# Patient Record
Sex: FEMALE | Race: WHITE | NOT HISPANIC OR LATINO | Employment: STUDENT | URBAN - METROPOLITAN AREA
[De-identification: names, ages, dates, MRNs, and addresses within clinical notes are randomized per-mention and may not be internally consistent; named-entity substitution may affect disease eponyms.]

---
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
## 2023-02-05 ENCOUNTER — HOSPITAL ENCOUNTER (EMERGENCY)
Facility: HOSPITAL | Age: 18
Discharge: HOME | End: 2023-02-05
Attending: EMERGENCY MEDICINE
Payer: COMMERCIAL

## 2023-02-05 VITALS
HEART RATE: 86 BPM | HEIGHT: 62 IN | TEMPERATURE: 98 F | WEIGHT: 105 LBS | BODY MASS INDEX: 19.32 KG/M2 | RESPIRATION RATE: 18 BRPM | SYSTOLIC BLOOD PRESSURE: 114 MMHG | DIASTOLIC BLOOD PRESSURE: 66 MMHG | OXYGEN SATURATION: 95 %

## 2023-02-05 DIAGNOSIS — N93.8 DYSFUNCTIONAL UTERINE BLEEDING: Primary | ICD-10-CM

## 2023-02-05 LAB
ABO + RH BLD: NORMAL
ALBUMIN SERPL BCP-MCNC: 4.3 G/DL (ref 3.5–5.2)
ALP SERPL-CCNC: 67 U/L (ref 55–135)
ALT SERPL W/O P-5'-P-CCNC: 7 U/L (ref 10–44)
ANION GAP SERPL CALC-SCNC: 12 MMOL/L (ref 8–16)
AST SERPL-CCNC: 18 U/L (ref 10–40)
B-HCG UR QL: NEGATIVE
BASOPHILS # BLD AUTO: 0.03 K/UL (ref 0–0.2)
BASOPHILS NFR BLD: 0.5 % (ref 0–1.9)
BILIRUB SERPL-MCNC: 0.4 MG/DL (ref 0.1–1)
BLD GP AB SCN CELLS X3 SERPL QL: NORMAL
BUN SERPL-MCNC: 11 MG/DL (ref 6–20)
CALCIUM SERPL-MCNC: 9.6 MG/DL (ref 8.7–10.5)
CHLORIDE SERPL-SCNC: 109 MMOL/L (ref 95–110)
CO2 SERPL-SCNC: 20 MMOL/L (ref 23–29)
CREAT SERPL-MCNC: 0.8 MG/DL (ref 0.5–1.4)
CTP QC/QA: YES
DIFFERENTIAL METHOD: ABNORMAL
EOSINOPHIL # BLD AUTO: 0.1 K/UL (ref 0–0.5)
EOSINOPHIL NFR BLD: 1.6 % (ref 0–8)
ERYTHROCYTE [DISTWIDTH] IN BLOOD BY AUTOMATED COUNT: 14.8 % (ref 11.5–14.5)
EST. GFR  (NO RACE VARIABLE): >60 ML/MIN/1.73 M^2
GLUCOSE SERPL-MCNC: 117 MG/DL (ref 70–110)
HCG INTACT+B SERPL-ACNC: <1.2 MIU/ML
HCT VFR BLD AUTO: 28.8 % (ref 37–48.5)
HGB BLD-MCNC: 8.4 G/DL (ref 12–16)
IMM GRANULOCYTES # BLD AUTO: 0.02 K/UL (ref 0–0.04)
IMM GRANULOCYTES NFR BLD AUTO: 0.3 % (ref 0–0.5)
LYMPHOCYTES # BLD AUTO: 1.5 K/UL (ref 1–4.8)
LYMPHOCYTES NFR BLD: 23.5 % (ref 18–48)
MCH RBC QN AUTO: 21.5 PG (ref 27–31)
MCHC RBC AUTO-ENTMCNC: 29.2 G/DL (ref 32–36)
MCV RBC AUTO: 74 FL (ref 82–98)
MONOCYTES # BLD AUTO: 0.7 K/UL (ref 0.3–1)
MONOCYTES NFR BLD: 11 % (ref 4–15)
NEUTROPHILS # BLD AUTO: 3.9 K/UL (ref 1.8–7.7)
NEUTROPHILS NFR BLD: 63.1 % (ref 38–73)
NRBC BLD-RTO: 0 /100 WBC
PLATELET # BLD AUTO: 215 K/UL (ref 150–450)
PMV BLD AUTO: ABNORMAL FL (ref 9.2–12.9)
POTASSIUM SERPL-SCNC: 3.5 MMOL/L (ref 3.5–5.1)
PROT SERPL-MCNC: 7.3 G/DL (ref 6–8.4)
RBC # BLD AUTO: 3.9 M/UL (ref 4–5.4)
SODIUM SERPL-SCNC: 141 MMOL/L (ref 136–145)
WBC # BLD AUTO: 6.21 K/UL (ref 3.9–12.7)

## 2023-02-05 PROCEDURE — 85025 COMPLETE CBC W/AUTO DIFF WBC: CPT | Performed by: EMERGENCY MEDICINE

## 2023-02-05 PROCEDURE — 63600175 PHARM REV CODE 636 W HCPCS: Performed by: EMERGENCY MEDICINE

## 2023-02-05 PROCEDURE — 25000003 PHARM REV CODE 250: Performed by: EMERGENCY MEDICINE

## 2023-02-05 PROCEDURE — 81025 URINE PREGNANCY TEST: CPT | Performed by: EMERGENCY MEDICINE

## 2023-02-05 PROCEDURE — 86900 BLOOD TYPING SEROLOGIC ABO: CPT | Performed by: EMERGENCY MEDICINE

## 2023-02-05 PROCEDURE — 99284 EMERGENCY DEPT VISIT MOD MDM: CPT | Mod: 25

## 2023-02-05 PROCEDURE — 96375 TX/PRO/DX INJ NEW DRUG ADDON: CPT

## 2023-02-05 PROCEDURE — 84702 CHORIONIC GONADOTROPIN TEST: CPT | Performed by: EMERGENCY MEDICINE

## 2023-02-05 PROCEDURE — 96374 THER/PROPH/DIAG INJ IV PUSH: CPT

## 2023-02-05 PROCEDURE — 80053 COMPREHEN METABOLIC PANEL: CPT | Performed by: EMERGENCY MEDICINE

## 2023-02-05 PROCEDURE — 96361 HYDRATE IV INFUSION ADD-ON: CPT

## 2023-02-05 RX ORDER — HYDROCODONE BITARTRATE AND ACETAMINOPHEN 5; 325 MG/1; MG/1
1 TABLET ORAL EVERY 6 HOURS PRN
Qty: 12 TABLET | Refills: 0 | Status: SHIPPED | OUTPATIENT
Start: 2023-02-05 | End: 2023-02-15

## 2023-02-05 RX ORDER — HYDROMORPHONE HYDROCHLORIDE 2 MG/ML
0.5 INJECTION, SOLUTION INTRAMUSCULAR; INTRAVENOUS; SUBCUTANEOUS
Status: COMPLETED | OUTPATIENT
Start: 2023-02-05 | End: 2023-02-05

## 2023-02-05 RX ORDER — TRANEXAMIC ACID 650 MG/1
1300 TABLET ORAL 3 TIMES DAILY
Qty: 30 TABLET | Refills: 0 | Status: SHIPPED | OUTPATIENT
Start: 2023-02-05 | End: 2023-02-10

## 2023-02-05 RX ORDER — KETOROLAC TROMETHAMINE 30 MG/ML
10 INJECTION, SOLUTION INTRAMUSCULAR; INTRAVENOUS
Status: COMPLETED | OUTPATIENT
Start: 2023-02-05 | End: 2023-02-05

## 2023-02-05 RX ADMIN — SODIUM CHLORIDE 1000 ML: 9 INJECTION, SOLUTION INTRAVENOUS at 01:02

## 2023-02-05 RX ADMIN — KETOROLAC TROMETHAMINE 10 MG: 30 INJECTION, SOLUTION INTRAMUSCULAR; INTRAVENOUS at 04:02

## 2023-02-05 RX ADMIN — SODIUM CHLORIDE 1000 ML: 9 INJECTION, SOLUTION INTRAVENOUS at 04:02

## 2023-02-05 RX ADMIN — HYDROMORPHONE HYDROCHLORIDE 0.5 MG: 2 INJECTION, SOLUTION INTRAMUSCULAR; INTRAVENOUS; SUBCUTANEOUS at 04:02

## 2023-02-05 NOTE — ED TRIAGE NOTES
"Pt to ED with complaints of heavy vaginal bleeding since this AM. Pt reports this is due to fibroids and possible endmetriosis. Reports been bleeding since Dec 22. States has always had havey periods but never this bad. Reports bleeding through appox 3 pads every 1 hr. States having "clots the size of fist." Pt slightly diaphoretic, pale. Appears to be very weak. Tachycardic. Family at side.     "

## 2023-02-05 NOTE — ED PROVIDER NOTES
Encounter Date: 2/5/2023    SCRIBE #1 NOTE: IJesus, am scribing for, and in the presence of,  Juan Pablo Taylor MD.     History     Chief Complaint   Patient presents with    Vaginal Bleeding     Presents to the ED via POV with c/o vaginal bleeding d/t fibroids and endometriosis. Reprots soaking x 3 pads in 1 hour and passing clots the size of her fist. Patient slightly diaphoretic and pale in triage.      18 y/o female with anemia, uterine fibroids presents to ED with constant vaginal bleeding and abdominal cramps since her menstrual cycle began on 12/22; her symptoms were notably worse and more painful today (9/10), which prompted the ED visit. She has been seeking surgery for uterine fibroids. No exacerbating or alleviating factors. No other associated symptoms. Patient endorses receiving Depo Provera which she got around the same time as her bleeding began endorses compliance with prenatals prescribed by OBGYN and iron supplements. Patient notes history syncopal episodes with unknown etiology. NKDA.     The history is provided by the patient. No  was used.   Review of patient's allergies indicates:  No Known Allergies  Past Medical History:   Diagnosis Date    ADD (attention deficit disorder with hyperactivity)      No past surgical history on file.  Family History   Problem Relation Age of Onset    Heart attacks under age 50 Neg Hx     Early death Neg Hx     Congenital heart disease Neg Hx      Social History     Tobacco Use    Smoking status: Never    Smokeless tobacco: Never   Substance Use Topics    Alcohol use: No    Drug use: No     Review of Systems   Constitutional:  Negative for fever.   HENT:  Negative for sore throat.    Eyes:  Negative for visual disturbance.   Respiratory:  Negative for shortness of breath.    Cardiovascular:  Negative for chest pain.   Gastrointestinal:  Positive for abdominal pain (cramps).   Genitourinary:  Positive for vaginal bleeding. Negative for  dysuria.   Musculoskeletal:  Negative for back pain.   Skin:  Negative for rash.   Neurological:  Negative for headaches.     Physical Exam     Initial Vitals   BP Pulse Resp Temp SpO2   02/05/23 1321 02/05/23 1321 02/05/23 1321 02/05/23 1321 02/05/23 1346   125/75 (!) 126 20 98 °F (36.7 °C) 96 %      MAP       --                Physical Exam    Nursing note and vitals reviewed.  Constitutional: She appears well-developed and well-nourished.   Pale appearance.   HENT:   Head: Normocephalic and atraumatic.   Eyes: EOM are normal. Pupils are equal, round, and reactive to light.   Neck: Neck supple. No thyromegaly present. No JVD present.   Normal range of motion.  Cardiovascular:  Regular rhythm.   Tachycardia present.   Exam reveals no gallop and no friction rub.       No murmur heard.  Pulmonary/Chest: Breath sounds normal. No respiratory distress.   Abdominal: Abdomen is soft. Bowel sounds are normal. There is no abdominal tenderness.   Musculoskeletal:         General: No tenderness or edema. Normal range of motion.      Cervical back: Normal range of motion and neck supple.     Neurological: She is alert and oriented to person, place, and time. She has normal strength. GCS score is 15. GCS eye subscore is 4. GCS verbal subscore is 5. GCS motor subscore is 6.   Skin: Skin is warm and dry. Capillary refill takes less than 2 seconds.   Psychiatric: She has a normal mood and affect.       ED Course   Critical Care    Date/Time: 2/5/2023 11:37 AM  Performed by: Juan Pablo Taylor MD  Authorized by: Juan Pablo Taylor MD   Direct patient critical care time: 20 minutes  Additional history critical care time: 5 minutes  Ordering / reviewing critical care time: 10 minutes  Documentation critical care time: 10 minutes  Consulting other physicians critical care time: 10 minutes  Total critical care time (exclusive of procedural time) : 55 minutes  Critical care time was exclusive of teaching time and separately billable  procedures and treating other patients.  Critical care was necessary to treat or prevent imminent or life-threatening deterioration of the following conditions: shock.  Critical care was time spent personally by me on the following activities: development of treatment plan with patient or surrogate, discussions with consultants, evaluation of patient's response to treatment, examination of patient, obtaining history from patient or surrogate, ordering and performing treatments and interventions, ordering and review of laboratory studies, ordering and review of radiographic studies, pulse oximetry, re-evaluation of patient's condition and review of old charts.      Labs Reviewed   CBC W/ AUTO DIFFERENTIAL - Abnormal; Notable for the following components:       Result Value    RBC 3.90 (*)     Hemoglobin 8.4 (*)     Hematocrit 28.8 (*)     MCV 74 (*)     MCH 21.5 (*)     MCHC 29.2 (*)     RDW 14.8 (*)     All other components within normal limits    Narrative:     Release to patient->Immediate   COMPREHENSIVE METABOLIC PANEL - Abnormal; Notable for the following components:    CO2 20 (*)     Glucose 117 (*)     ALT 7 (*)     All other components within normal limits    Narrative:     Release to patient->Immediate   HCG, QUANTITATIVE    Narrative:     Release to patient->Immediate   POCT URINE PREGNANCY   TYPE & SCREEN          Imaging Results    None          Medications   sodium chloride 0.9% bolus 1,000 mL 1,000 mL (0 mLs Intravenous Stopped 2/5/23 1715)   sodium chloride 0.9% bolus 1,000 mL 1,000 mL (0 mLs Intravenous Stopped 2/5/23 1628)   HYDROmorphone (PF) injection 0.5 mg (0.5 mg Intravenous Given 2/5/23 1628)   ketorolac injection 9.999 mg (9.999 mg Intravenous Given 2/5/23 1627)     Medical Decision Making:   History:   Old Medical Records: I decided to obtain old medical records.  Initial Assessment:   This is an emergent evaluation of a 17 y.o. female who presents with vaginal bleeding. The patient was seen  and examined. The history and physical exam was obtained. The nursing notes and vital signs were reviewed. Secondary to symptoms and examination findings, I ordered CBC, CMP, among others. .incl  Differential Diagnosis:   Includes but is not limited to: uterine fibroids, adverse contraceptive reaction, anemia, electrolyte imbalance, dehydration  Clinical Tests:   Lab Tests: Ordered and Reviewed   Patient arrives significantly tachycardic with heart rates in the 120s.  Patient is anemic but above the threshold for transfusion.  Patient was fluid responsive with tachycardia resolved.  Active bleeding is mild.  Discussed with OBGYN on call who gave recommendations and will follow up in clinic.  Recommended discharge on tranexamic acid     Scribe Attestation:   Scribe #1: I performed the above scribed service and the documentation accurately describes the services I performed. I attest to the accuracy of the note.            I, Juan Pablo Taylor, personally performed the services described in this documentation.  All medical record entries made by the scribe were at my direction and in my presence.  I have reviewed the chart and agree that the record reflects my personal performance and is accurate and complete.       Clinical Impression:   Final diagnoses:  [N93.8] Dysfunctional uterine bleeding (Primary)        ED Disposition Condition    Discharge Stable          ED Prescriptions       Medication Sig Dispense Start Date End Date Auth. Provider    tranexamic acid (LYSTEDA) 650 mg tablet () Take 2 tablets (1,300 mg total) by mouth 3 (three) times daily. for 5 days 30 tablet 2023 2/10/2023 Juan Pablo Taylor MD    HYDROcodone-acetaminophen (NORCO) 5-325 mg per tablet () Take 1 tablet by mouth every 6 (six) hours as needed for Pain. 12 tablet 2023 2/15/2023 Juan Pablo Taylor MD          Follow-up Information       Follow up With Specialties Details Why Contact Info    PortiaBarb Rajput Mai, MD  Obstetrics and Gynecology, Obstetrics, Gynecology Schedule an appointment as soon as possible for a visit  Or OB of your choice [address]  [place] [address]  [phone]               Juan Pablo Taylor MD  02/24/23 0655

## 2023-02-13 ENCOUNTER — TELEPHONE (OUTPATIENT)
Dept: OBSTETRICS AND GYNECOLOGY | Facility: CLINIC | Age: 18
End: 2023-02-13
Payer: COMMERCIAL

## 2023-02-13 NOTE — TELEPHONE ENCOUNTER
----- Message from Rustam Packer sent at 2/13/2023 12:33 PM CST -----      Name of Who is Calling: YESENIA PIKE [[id number]]      What is the request in detail: Pt called to get an appt for cycle lasting too long AND POSSIBLE FIBROIDS.Please contact to further discuss and advise.          Can the clinic reply by [facility]: n      What Number to Call Back if not in [facility]: [phone]

## 2023-03-03 ENCOUNTER — OFFICE VISIT (OUTPATIENT)
Dept: OBSTETRICS AND GYNECOLOGY | Facility: CLINIC | Age: 18
End: 2023-03-03
Payer: COMMERCIAL

## 2023-03-03 VITALS
SYSTOLIC BLOOD PRESSURE: 120 MMHG | WEIGHT: 107.13 LBS | DIASTOLIC BLOOD PRESSURE: 62 MMHG | BODY MASS INDEX: 18.98 KG/M2 | HEIGHT: 63 IN

## 2023-03-03 DIAGNOSIS — D50.0 IRON DEFICIENCY ANEMIA DUE TO CHRONIC BLOOD LOSS: ICD-10-CM

## 2023-03-03 DIAGNOSIS — D21.9 FIBROIDS: Primary | ICD-10-CM

## 2023-03-03 DIAGNOSIS — N93.9 ABNORMAL UTERINE BLEEDING: ICD-10-CM

## 2023-03-03 PROCEDURE — 3074F SYST BP LT 130 MM HG: CPT | Mod: CPTII,S$GLB,, | Performed by: STUDENT IN AN ORGANIZED HEALTH CARE EDUCATION/TRAINING PROGRAM

## 2023-03-03 PROCEDURE — 3008F PR BODY MASS INDEX (BMI) DOCUMENTED: ICD-10-PCS | Mod: CPTII,S$GLB,, | Performed by: STUDENT IN AN ORGANIZED HEALTH CARE EDUCATION/TRAINING PROGRAM

## 2023-03-03 PROCEDURE — 1159F PR MEDICATION LIST DOCUMENTED IN MEDICAL RECORD: ICD-10-PCS | Mod: CPTII,S$GLB,, | Performed by: STUDENT IN AN ORGANIZED HEALTH CARE EDUCATION/TRAINING PROGRAM

## 2023-03-03 PROCEDURE — 3078F PR MOST RECENT DIASTOLIC BLOOD PRESSURE < 80 MM HG: ICD-10-PCS | Mod: CPTII,S$GLB,, | Performed by: STUDENT IN AN ORGANIZED HEALTH CARE EDUCATION/TRAINING PROGRAM

## 2023-03-03 PROCEDURE — 99204 OFFICE O/P NEW MOD 45 MIN: CPT | Mod: S$GLB,,, | Performed by: STUDENT IN AN ORGANIZED HEALTH CARE EDUCATION/TRAINING PROGRAM

## 2023-03-03 PROCEDURE — 99999 PR PBB SHADOW E&M-EST. PATIENT-LVL III: CPT | Mod: PBBFAC,,, | Performed by: STUDENT IN AN ORGANIZED HEALTH CARE EDUCATION/TRAINING PROGRAM

## 2023-03-03 PROCEDURE — 3078F DIAST BP <80 MM HG: CPT | Mod: CPTII,S$GLB,, | Performed by: STUDENT IN AN ORGANIZED HEALTH CARE EDUCATION/TRAINING PROGRAM

## 2023-03-03 PROCEDURE — 3008F BODY MASS INDEX DOCD: CPT | Mod: CPTII,S$GLB,, | Performed by: STUDENT IN AN ORGANIZED HEALTH CARE EDUCATION/TRAINING PROGRAM

## 2023-03-03 PROCEDURE — 3074F PR MOST RECENT SYSTOLIC BLOOD PRESSURE < 130 MM HG: ICD-10-PCS | Mod: CPTII,S$GLB,, | Performed by: STUDENT IN AN ORGANIZED HEALTH CARE EDUCATION/TRAINING PROGRAM

## 2023-03-03 PROCEDURE — 99213 OFFICE O/P EST LOW 20 MIN: CPT | Mod: PBBFAC | Performed by: STUDENT IN AN ORGANIZED HEALTH CARE EDUCATION/TRAINING PROGRAM

## 2023-03-03 PROCEDURE — 99204 PR OFFICE/OUTPT VISIT, NEW, LEVL IV, 45-59 MIN: ICD-10-PCS | Mod: S$GLB,,, | Performed by: STUDENT IN AN ORGANIZED HEALTH CARE EDUCATION/TRAINING PROGRAM

## 2023-03-03 PROCEDURE — 1159F MED LIST DOCD IN RCRD: CPT | Mod: CPTII,S$GLB,, | Performed by: STUDENT IN AN ORGANIZED HEALTH CARE EDUCATION/TRAINING PROGRAM

## 2023-03-03 PROCEDURE — 99999 PR PBB SHADOW E&M-EST. PATIENT-LVL III: ICD-10-PCS | Mod: PBBFAC,,, | Performed by: STUDENT IN AN ORGANIZED HEALTH CARE EDUCATION/TRAINING PROGRAM

## 2023-03-03 RX ORDER — MEDROXYPROGESTERONE ACETATE 150 MG/ML
150 INJECTION, SUSPENSION INTRAMUSCULAR
Status: ON HOLD | COMMUNITY
Start: 2023-02-16 | End: 2023-03-21 | Stop reason: HOSPADM

## 2023-03-03 RX ORDER — HYDROCODONE BITARTRATE AND ACETAMINOPHEN 5; 325 MG/1; MG/1
1 TABLET ORAL
Status: ON HOLD | COMMUNITY
Start: 2023-02-05 | End: 2023-03-21 | Stop reason: HOSPADM

## 2023-03-03 RX ORDER — IBUPROFEN 200 MG
400 TABLET ORAL
Status: ON HOLD | COMMUNITY
End: 2023-03-21 | Stop reason: HOSPADM

## 2023-03-03 NOTE — PROGRESS NOTES
History & Physical  Gynecology      SUBJECTIVE:     Chief Complaint: Consult (Fibroid )       History of Present Illness:  Petra Medina is a 17 y.o. F who presents with her mom to discuss management of HMB due to fibroids. She was seeing a gynecologist at an outside facility and surgery was planned. There were some issues related to cost and coverage, so they are here for a second opinion. She has records with her today on her phone.    She reports that her periods have always been heavy and painful, but fairly regular. She was initially concerned about endometriosis because it runs in her family, but another doctor said she shouldn't worry about that at her age. She was started on the contraceptive patch, which worked fairly well but she didn't like that it was visible to others. She switched tot he nuvaring which she liked a lot. She felt that even though her periods were still heavy and painful, it was much more manageable. Her pain level would be a 4-5 compared to an 8. She was doing well on the nuvaring until then end of last year, when she had prolonged heavy bleeding. A depo shot was recommended but that only seemed to make matters worse. She was told to stay off the nuvaring. In January, the bleeding became so heavy that she went to the ED and was found to have a hemoglobin of 8. She was given oral TXA. An ultrasound was done and showed a 9 cm uterus with a 5 cm fibroid with a submucosal component that displaces the endometrium. Her doctor recommended diagnostic laparoscopy with possible fulguration of lesions, hysteroscopy with D&C, myomectomy, and possible laparotomy.     Currently she states her bleeding is still present but she would classify it as spotting. She does want to move forward with surgery and ultimately get back on the nuvaring. She is not interested in fertility any time soon, but would like to preserve fertility. She has not had a workup for a bleeding disorder. States no family history  of bleeding disorders, no history of bleeding with dental procedures. She does bruise sometimes but states she is just clumsy. She has never required a blood transfusion.       Review of patient's allergies indicates:  No Known Allergies    Past Medical History:   Diagnosis Date    ADD (attention deficit disorder with hyperactivity)      History reviewed. No pertinent surgical history.  OB History          0    Para   0    Term   0       0    AB   0    Living   0         SAB   0    IAB   0    Ectopic   0    Multiple   0    Live Births   0               Family History   Problem Relation Age of Onset    Heart attacks under age 50 Neg Hx     Early death Neg Hx     Congenital heart disease Neg Hx     Breast cancer Neg Hx     Colon cancer Neg Hx     Ovarian cancer Neg Hx      Social History     Tobacco Use    Smoking status: Never    Smokeless tobacco: Never   Substance Use Topics    Alcohol use: No    Drug use: No       Current Outpatient Medications   Medication Sig    ferrous sulfate 134 mg (27 mg iron) Tab Take 1 tablet by mouth.    HYDROcodone-acetaminophen (NORCO) 5-325 mg per tablet Take 1 tablet by mouth.    ibuprofen (ADVIL,MOTRIN) 200 MG tablet Take 400 mg by mouth.    medroxyPROGESTERone (DEPO-PROVERA) 150 mg/mL Syrg Inject 150 mg into the muscle every 3 (three) months.    prenatal vit-iron fum-folic ac 27 mg iron- 0.8 mg Tab Take 1 tablet by mouth.    DOCOSAHEXANOIC ACID/EPA (FISH OIL ORAL) Take by mouth.    famotidine (PEPCID) 20 MG tablet Take 2 tablets (40 mg total) by mouth 2 (two) times daily. (Patient not taking: Reported on 3/3/2023)    methylphenidate (CONCERTA) 54 MG CR tablet Take 36 mg by mouth every morning.     methylphenidate HCl (RITALIN) 5 MG tablet 5 mg.    sertraline (ZOLOFT) 50 MG tablet Take 100 mg by mouth once daily.      No current facility-administered medications for this visit.         Review of Systems:  Review of Systems   Constitutional:  Negative for fever.    Respiratory:  Negative for shortness of breath.    Cardiovascular:  Negative for chest pain.   Gastrointestinal:  Negative for abdominal pain, nausea and vomiting.   Genitourinary:  Positive for dysmenorrhea, menorrhagia, menstrual problem and vaginal bleeding. Negative for pelvic pain.   Neurological:  Negative for headaches.      OBJECTIVE:     Physical Exam:  Physical Exam  Vitals reviewed.   Constitutional:       General: She is not in acute distress.     Appearance: She is well-developed. She is not ill-appearing, toxic-appearing or diaphoretic.   HENT:      Head: Normocephalic and atraumatic.   Eyes:      Conjunctiva/sclera: Conjunctivae normal.   Cardiovascular:      Rate and Rhythm: Normal rate.   Pulmonary:      Effort: Pulmonary effort is normal. No respiratory distress.   Abdominal:      Palpations: Abdomen is soft.      Tenderness: There is no abdominal tenderness.   Musculoskeletal:         General: No tenderness. Normal range of motion.      Cervical back: Normal range of motion.   Skin:     General: Skin is warm and dry.   Neurological:      Mental Status: She is alert and oriented to person, place, and time.   Psychiatric:         Behavior: Behavior normal.         Thought Content: Thought content normal.         Judgment: Judgment normal.         ASSESSMENT:       ICD-10-CM ICD-9-CM    1. Fibroids  D21.9 215.9       2. Abnormal uterine bleeding  N93.9 626.9       3. Iron deficiency anemia due to chronic blood loss  D50.0 280.0              Plan:      -- Lengthy discussion about management of fibroids -- medical and surgical options including UAE. Discussed different types of myomectomy depending on location of fibroids, which can be difficult to assess on ultrasound.  -- She is interested in surgery. Would recommend diagnostic laparoscopy and hysteroscopic myomectomy at minimum. She would prefer to avoid laparotomy. Discussed implications of myomectomy when fibroid is intramural in terms of  obstetric care. All questions answered.  -- She will consider her options and let us know if she would like to proceed with surgery. Would recommend repeating CBC prior surgery, including coagulopathy workup.       Katja Dang MD

## 2023-03-06 ENCOUNTER — TELEPHONE (OUTPATIENT)
Dept: OBSTETRICS AND GYNECOLOGY | Facility: CLINIC | Age: 18
End: 2023-03-06
Payer: COMMERCIAL

## 2023-03-06 DIAGNOSIS — N93.9 ABNORMAL UTERINE BLEEDING: ICD-10-CM

## 2023-03-06 DIAGNOSIS — D21.9 FIBROIDS: Primary | ICD-10-CM

## 2023-03-06 DIAGNOSIS — D50.0 IRON DEFICIENCY ANEMIA DUE TO CHRONIC BLOOD LOSS: ICD-10-CM

## 2023-03-06 NOTE — TELEPHONE ENCOUNTER
----- Message from Florentino Tirado sent at 3/6/2023  2:15 PM CST -----  PLEASE CALL PT SHE NEEDS TO RESCHEDULE HER PRE-OP APPT 3/17 [phone]

## 2023-03-17 ENCOUNTER — PATIENT MESSAGE (OUTPATIENT)
Dept: SURGERY | Facility: OTHER | Age: 18
End: 2023-03-17
Payer: COMMERCIAL

## 2023-03-17 ENCOUNTER — OFFICE VISIT (OUTPATIENT)
Dept: OBSTETRICS AND GYNECOLOGY | Facility: CLINIC | Age: 18
End: 2023-03-17
Payer: MEDICAID

## 2023-03-17 ENCOUNTER — ANESTHESIA EVENT (OUTPATIENT)
Dept: SURGERY | Facility: OTHER | Age: 18
End: 2023-03-17
Payer: COMMERCIAL

## 2023-03-17 ENCOUNTER — TELEPHONE (OUTPATIENT)
Dept: OBSTETRICS AND GYNECOLOGY | Facility: CLINIC | Age: 18
End: 2023-03-17
Payer: COMMERCIAL

## 2023-03-17 ENCOUNTER — HOSPITAL ENCOUNTER (OUTPATIENT)
Dept: PREADMISSION TESTING | Facility: OTHER | Age: 18
Discharge: HOME | End: 2023-03-17
Attending: STUDENT IN AN ORGANIZED HEALTH CARE EDUCATION/TRAINING PROGRAM
Payer: COMMERCIAL

## 2023-03-17 VITALS
RESPIRATION RATE: 16 BRPM | SYSTOLIC BLOOD PRESSURE: 109 MMHG | DIASTOLIC BLOOD PRESSURE: 62 MMHG | HEIGHT: 63 IN | TEMPERATURE: 98 F | HEART RATE: 90 BPM | OXYGEN SATURATION: 100 % | WEIGHT: 108 LBS | BODY MASS INDEX: 19.14 KG/M2

## 2023-03-17 VITALS
SYSTOLIC BLOOD PRESSURE: 106 MMHG | DIASTOLIC BLOOD PRESSURE: 60 MMHG | HEIGHT: 63 IN | BODY MASS INDEX: 19.18 KG/M2 | WEIGHT: 108.25 LBS

## 2023-03-17 DIAGNOSIS — D50.0 IRON DEFICIENCY ANEMIA DUE TO CHRONIC BLOOD LOSS: ICD-10-CM

## 2023-03-17 DIAGNOSIS — D21.9 FIBROIDS: Primary | ICD-10-CM

## 2023-03-17 DIAGNOSIS — Z01.818 PREOP TESTING: Primary | ICD-10-CM

## 2023-03-17 DIAGNOSIS — N93.9 ABNORMAL UTERINE BLEEDING: ICD-10-CM

## 2023-03-17 LAB
ABO + RH BLD: NORMAL
APTT BLDCRRT: 24 SEC (ref 21–32)
BASOPHILS # BLD AUTO: 0.04 K/UL (ref 0–0.2)
BASOPHILS NFR BLD: 0.8 % (ref 0–1.9)
BLD GP AB SCN CELLS X3 SERPL QL: NORMAL
DIFFERENTIAL METHOD: ABNORMAL
EOSINOPHIL # BLD AUTO: 0.1 K/UL (ref 0–0.5)
EOSINOPHIL NFR BLD: 1.5 % (ref 0–8)
ERYTHROCYTE [DISTWIDTH] IN BLOOD BY AUTOMATED COUNT: 16.9 % (ref 11.5–14.5)
FERRITIN SERPL-MCNC: <2 NG/ML (ref 20–300)
FIBRINOGEN PPP-MCNC: 249 MG/DL (ref 182–400)
HCT VFR BLD AUTO: 26.3 % (ref 37–48.5)
HGB BLD-MCNC: 7 G/DL (ref 12–16)
IMM GRANULOCYTES # BLD AUTO: 0.01 K/UL (ref 0–0.04)
IMM GRANULOCYTES NFR BLD AUTO: 0.2 % (ref 0–0.5)
INR PPP: 1.1 (ref 0.8–1.2)
LYMPHOCYTES # BLD AUTO: 1.5 K/UL (ref 1–4.8)
LYMPHOCYTES NFR BLD: 31.2 % (ref 18–48)
MCH RBC QN AUTO: 18.2 PG (ref 27–31)
MCHC RBC AUTO-ENTMCNC: 26.6 G/DL (ref 32–36)
MCV RBC AUTO: 69 FL (ref 82–98)
MONOCYTES # BLD AUTO: 0.5 K/UL (ref 0.3–1)
MONOCYTES NFR BLD: 10.9 % (ref 4–15)
NEUTROPHILS # BLD AUTO: 2.7 K/UL (ref 1.8–7.7)
NEUTROPHILS NFR BLD: 55.4 % (ref 38–73)
NRBC BLD-RTO: 0 /100 WBC
PLATELET # BLD AUTO: 306 K/UL (ref 150–450)
PMV BLD AUTO: ABNORMAL FL (ref 9.2–12.9)
PROTHROMBIN TIME: 12.1 SEC (ref 9–12.5)
RBC # BLD AUTO: 3.84 M/UL (ref 4–5.4)
WBC # BLD AUTO: 4.78 K/UL (ref 3.9–12.7)

## 2023-03-17 PROCEDURE — 86920 COMPATIBILITY TEST SPIN: CPT | Performed by: NURSE PRACTITIONER

## 2023-03-17 PROCEDURE — 85397 CLOTTING FUNCT ACTIVITY: CPT | Performed by: STUDENT IN AN ORGANIZED HEALTH CARE EDUCATION/TRAINING PROGRAM

## 2023-03-17 PROCEDURE — 85730 THROMBOPLASTIN TIME PARTIAL: CPT | Performed by: STUDENT IN AN ORGANIZED HEALTH CARE EDUCATION/TRAINING PROGRAM

## 2023-03-17 PROCEDURE — 99999 PR PBB SHADOW E&M-EST. PATIENT-LVL III: CPT | Mod: PBBFAC,,, | Performed by: STUDENT IN AN ORGANIZED HEALTH CARE EDUCATION/TRAINING PROGRAM

## 2023-03-17 PROCEDURE — 99024 POSTOP FOLLOW-UP VISIT: CPT | Mod: ,,, | Performed by: STUDENT IN AN ORGANIZED HEALTH CARE EDUCATION/TRAINING PROGRAM

## 2023-03-17 PROCEDURE — 3078F PR MOST RECENT DIASTOLIC BLOOD PRESSURE < 80 MM HG: ICD-10-PCS | Mod: CPTII,,, | Performed by: STUDENT IN AN ORGANIZED HEALTH CARE EDUCATION/TRAINING PROGRAM

## 2023-03-17 PROCEDURE — 85240 CLOT FACTOR VIII AHG 1 STAGE: CPT | Performed by: STUDENT IN AN ORGANIZED HEALTH CARE EDUCATION/TRAINING PROGRAM

## 2023-03-17 PROCEDURE — 3008F PR BODY MASS INDEX (BMI) DOCUMENTED: ICD-10-PCS | Mod: CPTII,,, | Performed by: STUDENT IN AN ORGANIZED HEALTH CARE EDUCATION/TRAINING PROGRAM

## 2023-03-17 PROCEDURE — 85384 FIBRINOGEN ACTIVITY: CPT | Performed by: STUDENT IN AN ORGANIZED HEALTH CARE EDUCATION/TRAINING PROGRAM

## 2023-03-17 PROCEDURE — 3008F BODY MASS INDEX DOCD: CPT | Mod: CPTII,,, | Performed by: STUDENT IN AN ORGANIZED HEALTH CARE EDUCATION/TRAINING PROGRAM

## 2023-03-17 PROCEDURE — 36415 COLL VENOUS BLD VENIPUNCTURE: CPT | Performed by: STUDENT IN AN ORGANIZED HEALTH CARE EDUCATION/TRAINING PROGRAM

## 2023-03-17 PROCEDURE — 99213 OFFICE O/P EST LOW 20 MIN: CPT | Mod: PBBFAC | Performed by: STUDENT IN AN ORGANIZED HEALTH CARE EDUCATION/TRAINING PROGRAM

## 2023-03-17 PROCEDURE — 3074F SYST BP LT 130 MM HG: CPT | Mod: CPTII,,, | Performed by: STUDENT IN AN ORGANIZED HEALTH CARE EDUCATION/TRAINING PROGRAM

## 2023-03-17 PROCEDURE — 86900 BLOOD TYPING SEROLOGIC ABO: CPT | Performed by: ANESTHESIOLOGY

## 2023-03-17 PROCEDURE — 3074F PR MOST RECENT SYSTOLIC BLOOD PRESSURE < 130 MM HG: ICD-10-PCS | Mod: CPTII,,, | Performed by: STUDENT IN AN ORGANIZED HEALTH CARE EDUCATION/TRAINING PROGRAM

## 2023-03-17 PROCEDURE — 85610 PROTHROMBIN TIME: CPT | Performed by: STUDENT IN AN ORGANIZED HEALTH CARE EDUCATION/TRAINING PROGRAM

## 2023-03-17 PROCEDURE — 85246 CLOT FACTOR VIII VW ANTIGEN: CPT | Performed by: STUDENT IN AN ORGANIZED HEALTH CARE EDUCATION/TRAINING PROGRAM

## 2023-03-17 PROCEDURE — 85025 COMPLETE CBC W/AUTO DIFF WBC: CPT | Performed by: STUDENT IN AN ORGANIZED HEALTH CARE EDUCATION/TRAINING PROGRAM

## 2023-03-17 PROCEDURE — 3078F DIAST BP <80 MM HG: CPT | Mod: CPTII,,, | Performed by: STUDENT IN AN ORGANIZED HEALTH CARE EDUCATION/TRAINING PROGRAM

## 2023-03-17 PROCEDURE — 99999 PR PBB SHADOW E&M-EST. PATIENT-LVL III: ICD-10-PCS | Mod: PBBFAC,,, | Performed by: STUDENT IN AN ORGANIZED HEALTH CARE EDUCATION/TRAINING PROGRAM

## 2023-03-17 PROCEDURE — 82728 ASSAY OF FERRITIN: CPT | Performed by: STUDENT IN AN ORGANIZED HEALTH CARE EDUCATION/TRAINING PROGRAM

## 2023-03-17 PROCEDURE — 99024 PR POST-OP FOLLOW-UP VISIT: ICD-10-PCS | Mod: ,,, | Performed by: STUDENT IN AN ORGANIZED HEALTH CARE EDUCATION/TRAINING PROGRAM

## 2023-03-17 RX ORDER — SODIUM CHLORIDE, SODIUM LACTATE, POTASSIUM CHLORIDE, CALCIUM CHLORIDE 600; 310; 30; 20 MG/100ML; MG/100ML; MG/100ML; MG/100ML
INJECTION, SOLUTION INTRAVENOUS CONTINUOUS
Status: CANCELLED | OUTPATIENT
Start: 2023-03-17

## 2023-03-17 RX ORDER — ACETAMINOPHEN 500 MG
1000 TABLET ORAL
Status: CANCELLED | OUTPATIENT
Start: 2023-03-17 | End: 2023-03-17

## 2023-03-17 RX ORDER — LIDOCAINE HYDROCHLORIDE 10 MG/ML
0.5 INJECTION, SOLUTION EPIDURAL; INFILTRATION; INTRACAUDAL; PERINEURAL ONCE
Status: CANCELLED | OUTPATIENT
Start: 2023-03-17 | End: 2023-03-17

## 2023-03-17 NOTE — PRE ADMISSION SCREENING
Dr Dang called re CBC results. Dr Love also informed. Per Dr Love- an IROn Transfusion is recommended and waiting until pt is optimized for surgery.

## 2023-03-17 NOTE — DISCHARGE INSTRUCTIONS
Information to Prepare you for your Surgery    PRE-ADMIT TESTING -  [phone]    [address] \[facility]\""[facility]          Your surgery has been scheduled at [facility]. We are pleased to have the opportunity to serve you. For Further Information please call [phone].    On the day of surgery please report to the Information Desk on the 1st floor.    CONTACT YOUR PHYSICIAN'S OFFICE THE DAY PRIOR TO YOUR SURGERY TO OBTAIN YOUR ARRIVAL TIME.     The evening before surgery do not eat anything after 9 p.m. ( this includes hard candy, chewing gum and mints).  You may only have GATORADE, POWERADE AND WATER  from 9 p.m. until you leave your home.   DO NOT DRINK ANY LIQUIDS ON THE WAY TO THE HOSPITAL.      Why does your anesthesiologist allow you to drink Gatorade/Powerade before surgery?  Gatorade/Powerade helps to increase your comfort before surgery and to decrease your nausea after surgery. The carbohydrates in Gatorade/Powerade help reduce your body's stress response to surgery.  If you are a diabetic-drink only water prior to surgery.      Current Visitor policy(12/27/2021) - Patients may have 2 visitors pre and post procedure. Only 2 visitors will be allowed in the Surgical building with the patient. No one under the age of 12 will be allowed into the facility.    SPECIAL MEDICATION INSTRUCTIONS: TAKE medications checked off by the Anesthesiologist on your Medication List.    Angiogram Patients: Take medications as instructed by your physician, including aspirin.     Surgery Patients:    If you take ASPIRIN - Your PHYSICIAN/SURGEON will need to inform you IF/OR when you need to stop taking aspirin prior to your surgery.     The week prior to surgery do not ot take any medications containing IBUPROFEN or NSAIDS ( Advil, Motrin, Goodys, BC, Aleve, Naproxen etc) If you are not sure if you should take a medicine please call your surgeon's office.  Ok to take  Tylenol    Do Not Wear any make-up (especially eye make-up) to surgery. Please remove any false eyelashes or eyelash extensions. If you arrive the day of surgery with makeup/eyelashes on you will be required to remove prior to surgery. (There is a risk of corneal abrasions if eye makeup/eyelash extensions are not removed)      Leave all valuables at home.   Do Not wear any jewelry or watches, including any metal in body piercings. Jewelry must be removed prior to coming to the hospital.  There is a possibility that rings that are unable to be removed may be cut off if they are on the surgical extremity.    Please remove all hair extensions, wigs, clips and any other metal accessories/ ornaments from your hair.  These items may pose a flammable/fire risk in Surgery and must be removed.    Do not shave your surgical area at least 5 days prior to your surgery. The surgical prep will be performed at the hospital according to Infection Control regulations.    Contact Lens must be removed before surgery. Either do not wear the contact lens or bring a case and solution for storage.  Please bring a container for eyeglasses or dentures as required.  Bring any paperwork your physician has provided, such as consent forms,  history and physicals, doctor's orders, etc.   Bring comfortable clothes that are loose fitting to wear upon discharge. Take into consideration the type of surgery being performed.  Maintain your diet as advised per your physician the day prior to surgery.      Adequate rest the night before surgery is advised.   Park in the Parking lot behind the hospital or in the [facility] across the street from the parking lot. Parking is complimentary.  If you will be discharged the same day as your procedure, please arrange for a responsible adult to drive you home or to accompany you if traveling by taxi.   YOU WILL NOT BE PERMITTED TO DRIVE OR TO LEAVE THE HOSPITAL ALONE AFTER SURGERY.   If you are  being discharged the same day, it is strongly recommended that you arrange for someone to remain with you for the first 24 hrs following your surgery.    The Surgeon will speak to your family/visitor after your surgery regarding the outcome of your surgery and post op care.  The Surgeon may speak to you after your surgery, but there is a possibility you may not remember the details.  Please check with your family members regarding the conversation with the Surgeon.    We strongly recommend whoever is bringing you home be present for discharge instructions.  This will ensure a thorough understanding for your post op home care.    ALL CHILDREN MUST ALWAYS BE ACCOMPANIED BY AN ADULT.    Visitors-Refer to current Visitor policy handouts.    Thank you for your cooperation.  The Staff of [facility].            Bathing Instructions with Hibiclens    Shower the evening before and morning of your procedure with Chlorhexidine (Hibiclens)  do not use Chlorhexidine on your face or genitals. Do not get in your eyes.  Wash your face with water and your regular face wash/soap  Use your regular shampoo  Apply Chlorhexidine (Hibiclens) directly on your skin or on a wet washcloth and wash gently. When showering: Move away from the shower stream when applying Chlorhexidine (Hibiclens) to avoid rinsing off too soon.  Rinse thoroughly with warm water  Do not dilute Chlorhexidine (Hibiclens)   Dry off as usual, do not use any deodorant, powder, body lotions, perfume, after shave or cologne.

## 2023-03-17 NOTE — ANESTHESIA PREPROCEDURE EVALUATION
03/17/2023  Petra Medina is a 17 y.o., female.      Pre-op Assessment    I have reviewed the Patient Summary Reports.     I have reviewed the Nursing Notes. I have reviewed the NPO Status.   I have reviewed the Medications.     Review of Systems  Anesthesia Hx:  Neg history of prior surgery. Denies Family Hx of Anesthesia complications.   Denies Personal Hx of Anesthesia complications.   Social:  Non-Smoker    Hematology/Oncology:     Oncology Normal    -- Anemia (last hb 8.4): Hematology Comments: Heavy vag bleeding  Known uterine fibroid    W/u in progress to r/o [employer], factor 8  No hx    [place] with anemia one flight stairs    EENT/Dental:EENT/Dental Normal   Cardiovascular:  Cardiovascular Normal Exercise tolerance: good     Pulmonary:  Pulmonary Normal    Renal/:  Renal/ Normal     Hepatic/GI:  Hepatic/GI Normal    Musculoskeletal:  Musculoskeletal Normal    Neurological:  Neurology Normal    Endocrine:  Endocrine Normal    Dermatological:  Skin Normal    Psych:  Psychiatric Normal           Physical Exam  General: Cooperative, Alert and Oriented    Airway:  Mallampati: II   Mouth Opening: Normal  TM Distance: Normal  Tongue: Piercing  Neck ROM: Normal ROM    Dental:  Intact        Anesthesia Plan  Type of Anesthesia, risks & benefits discussed:    Anesthesia Type: Gen ETT  Intra-op Monitoring Plan: Standard ASA Monitors  Post Op Pain Control Plan: multimodal analgesia and IV/PO Opioids PRN  Induction:  IV  Airway Plan: Video  Informed Consent: Informed consent signed with the Patient and all parties understand the risks and agree with anesthesia plan.  All questions answered.   ASA Score: 2  Anesthesia Plan Notes: CBC today with heme w/u lab    Pt received unit PRBC yesterday as well as Iron infusion  Another unit supposedly prepared    Ready For Surgery From Anesthesia  Perspective.     .

## 2023-03-17 NOTE — TELEPHONE ENCOUNTER
Spoke with pt in regard to blood transfusion. Notified pt of blood type shortage and advise her on next best (iv iron) and postpone surgery date. Pt verbalized understand and all questions answered.

## 2023-03-20 ENCOUNTER — TELEPHONE (OUTPATIENT)
Dept: OBSTETRICS AND GYNECOLOGY | Facility: CLINIC | Age: 18
End: 2023-03-20
Payer: COMMERCIAL

## 2023-03-20 ENCOUNTER — HOSPITAL ENCOUNTER (EMERGENCY)
Facility: OTHER | Age: 18
Discharge: HOME | End: 2023-03-20
Attending: EMERGENCY MEDICINE
Payer: COMMERCIAL

## 2023-03-20 VITALS
OXYGEN SATURATION: 100 % | DIASTOLIC BLOOD PRESSURE: 85 MMHG | HEIGHT: 63 IN | WEIGHT: 108 LBS | TEMPERATURE: 99 F | SYSTOLIC BLOOD PRESSURE: 130 MMHG | RESPIRATION RATE: 18 BRPM | HEART RATE: 87 BPM | BODY MASS INDEX: 19.14 KG/M2

## 2023-03-20 DIAGNOSIS — D64.9 SYMPTOMATIC ANEMIA: Primary | ICD-10-CM

## 2023-03-20 DIAGNOSIS — E61.1 IRON DEFICIENCY: ICD-10-CM

## 2023-03-20 DIAGNOSIS — D21.9 FIBROIDS: ICD-10-CM

## 2023-03-20 LAB
ALBUMIN SERPL BCP-MCNC: 4.5 G/DL (ref 3.5–5.2)
ALP SERPL-CCNC: 77 U/L (ref 55–135)
ALT SERPL W/O P-5'-P-CCNC: 8 U/L (ref 10–44)
ANION GAP SERPL CALC-SCNC: 9 MMOL/L (ref 8–16)
AST SERPL-CCNC: 17 U/L (ref 10–40)
BASOPHILS # BLD AUTO: 0.05 K/UL (ref 0–0.2)
BASOPHILS NFR BLD: 0.8 % (ref 0–1.9)
BILIRUB SERPL-MCNC: 0.3 MG/DL (ref 0.1–1)
BLD PROD TYP BPU: NORMAL
BLOOD UNIT EXPIRATION DATE: NORMAL
BLOOD UNIT TYPE CODE: 5100
BLOOD UNIT TYPE: NORMAL
BUN SERPL-MCNC: 7 MG/DL (ref 6–20)
CALCIUM SERPL-MCNC: 9 MG/DL (ref 8.7–10.5)
CHLORIDE SERPL-SCNC: 108 MMOL/L (ref 95–110)
CO2 SERPL-SCNC: 21 MMOL/L (ref 23–29)
CODING SYSTEM: NORMAL
CREAT SERPL-MCNC: 0.8 MG/DL (ref 0.5–1.4)
CROSSMATCH INTERPRETATION: NORMAL
DIFFERENTIAL METHOD: ABNORMAL
DISPENSE STATUS: NORMAL
EOSINOPHIL # BLD AUTO: 0.1 K/UL (ref 0–0.5)
EOSINOPHIL NFR BLD: 1.7 % (ref 0–8)
ERYTHROCYTE [DISTWIDTH] IN BLOOD BY AUTOMATED COUNT: 16.8 % (ref 11.5–14.5)
EST. GFR  (NO RACE VARIABLE): >60 ML/MIN/1.73 M^2
FACT VIII ACT/NOR PPP: 153 % (ref 60–170)
GLUCOSE SERPL-MCNC: 106 MG/DL (ref 70–110)
HCT VFR BLD AUTO: 26.7 % (ref 37–48.5)
HGB BLD-MCNC: 7.3 G/DL (ref 12–16)
IMM GRANULOCYTES # BLD AUTO: 0.02 K/UL (ref 0–0.04)
IMM GRANULOCYTES NFR BLD AUTO: 0.3 % (ref 0–0.5)
IRON SERPL-MCNC: 10 UG/DL (ref 30–160)
LYMPHOCYTES # BLD AUTO: 1.9 K/UL (ref 1–4.8)
LYMPHOCYTES NFR BLD: 31.1 % (ref 18–48)
MCH RBC QN AUTO: 18.4 PG (ref 27–31)
MCHC RBC AUTO-ENTMCNC: 27.3 G/DL (ref 32–36)
MCV RBC AUTO: 67 FL (ref 82–98)
MONOCYTES # BLD AUTO: 0.6 K/UL (ref 0.3–1)
MONOCYTES NFR BLD: 10.3 % (ref 4–15)
NEUTROPHILS # BLD AUTO: 3.4 K/UL (ref 1.8–7.7)
NEUTROPHILS NFR BLD: 55.8 % (ref 38–73)
NRBC BLD-RTO: 0 /100 WBC
PLATELET # BLD AUTO: 344 K/UL (ref 150–450)
PLATELET BLD QL SMEAR: ABNORMAL
PMV BLD AUTO: ABNORMAL FL (ref 9.2–12.9)
POTASSIUM SERPL-SCNC: 3.3 MMOL/L (ref 3.5–5.1)
PROT SERPL-MCNC: 7.7 G/DL (ref 6–8.4)
RBC # BLD AUTO: 3.96 M/UL (ref 4–5.4)
SATURATED IRON: 2 % (ref 20–50)
SODIUM SERPL-SCNC: 138 MMOL/L (ref 136–145)
TOTAL IRON BINDING CAPACITY: 555 UG/DL (ref 250–450)
TRANS ERYTHROCYTES VOL PATIENT: NORMAL ML
TRANSFERRIN SERPL-MCNC: 375 MG/DL (ref 200–375)
VWF AG ACT/NOR PPP IA: 116 % (ref 55–200)
VWF:AC ACT/NOR PPP IA: 93 % (ref 55–200)
WBC # BLD AUTO: 6.01 K/UL (ref 3.9–12.7)

## 2023-03-20 PROCEDURE — 84466 ASSAY OF TRANSFERRIN: CPT | Performed by: NURSE PRACTITIONER

## 2023-03-20 PROCEDURE — 36430 TRANSFUSION BLD/BLD COMPNT: CPT

## 2023-03-20 PROCEDURE — 25000003 PHARM REV CODE 250: Performed by: NURSE PRACTITIONER

## 2023-03-20 PROCEDURE — P9021 RED BLOOD CELLS UNIT: HCPCS | Performed by: NURSE PRACTITIONER

## 2023-03-20 PROCEDURE — 96365 THER/PROPH/DIAG IV INF INIT: CPT | Mod: 59

## 2023-03-20 PROCEDURE — 99285 EMERGENCY DEPT VISIT HI MDM: CPT | Mod: 25

## 2023-03-20 PROCEDURE — 85025 COMPLETE CBC W/AUTO DIFF WBC: CPT | Performed by: NURSE PRACTITIONER

## 2023-03-20 PROCEDURE — 63600175 PHARM REV CODE 636 W HCPCS: Performed by: NURSE PRACTITIONER

## 2023-03-20 PROCEDURE — 36415 COLL VENOUS BLD VENIPUNCTURE: CPT | Performed by: NURSE PRACTITIONER

## 2023-03-20 PROCEDURE — 80053 COMPREHEN METABOLIC PANEL: CPT | Performed by: NURSE PRACTITIONER

## 2023-03-20 RX ORDER — HYDROCODONE BITARTRATE AND ACETAMINOPHEN 500; 5 MG/1; MG/1
TABLET ORAL
Status: DISCONTINUED | OUTPATIENT
Start: 2023-03-20 | End: 2023-03-20 | Stop reason: HOSPADM

## 2023-03-20 RX ADMIN — IRON SUCROSE 300 MG: 20 INJECTION, SOLUTION INTRAVENOUS at 04:03

## 2023-03-20 NOTE — ED TRIAGE NOTES
Sent here for blood transfusion prior to scheduled surgery tomorrow. Mother reports h/h of 7.0/26.3. Pt reports SOB, chills, fatigue and joint/back pain since January. Pale, reports vaginal bleeding since December.

## 2023-03-20 NOTE — TELEPHONE ENCOUNTER
----- Message from Pamela Savage sent at 3/20/2023  1:48 PM CDT -----  Regarding: Return call    Type:  Patient Returning Call    Who Called: YESENIA PIKE [[phone]]    Who Left Message for Patient: unknown     Does the patient know what this is regarding?: N    Can the clinic reply in [facility]: No    Best Call Back Number: [phone]    Additional Information: N/A

## 2023-03-20 NOTE — ED NOTES
Pt awake and alert; resting quietly on stretcher. Mother is at bedside.  Pt denies pain at this time; no acute distress or discomfort reported or observed.  Pt denies restroom needs at this time; is able to reposition self on stretcher. Bed locked in lowest position; side rails up and locked x 2; call light, bedside table, and personal belongings within reach. Room assessed for safety measures and cleanliness; no action needed at this time. Plan of care discussed.  Pt instructed to alert nurse for assistance and before attempting to get out of bed; verbalizes understanding. Pt denies needs or complaints at this time; will continue to monitor.

## 2023-03-20 NOTE — ED PROVIDER NOTES
"Source of History:  Patient    Chief complaint:  Transfusion and Shortness of Breath (Sent here for blood transfusion prior to scheduled surgery tomorrow. Mother reports h/h of 7.0/26.3. pt reports sob, chills, fatigue and joint/back pain since January. Pale, reports vaginal bleeding since December. )      HPI:  Petra Medina is a 17 y.o. female presenting with need for blood transfusion and iron infusion.  She is scheduled for a myomectomy tomorrow with gyn however she was noted to be anemic with a hemoglobin of 7.  She also reports having fatigue and shortness of breath that has been increasing over the past few weeks.  The anesthesiologist was also recommending a iron infusion prior to surgery.  Was sent to the ED for blood transfusion.    This is the extent to the patients complaints today here in the emergency department.    PMH:  As per HPI and below:  Past Medical History:   Diagnosis Date    ADD (attention deficit disorder with hyperactivity)     Anemia, unspecified      No past surgical history on file.    Social History     Tobacco Use    Smoking status: Never    Smokeless tobacco: Never    Tobacco comments:     VAPING   Substance Use Topics    Alcohol use: No     Comment: SOCIAL    Drug use: No       Review of patient's allergies indicates:   Allergen Reactions    Cherries      unknown    Lavender      unknown       ROS: As per HPI and below:  Constitutional: No fever.  No chills.  Fatigue  Eyes: No visual changes.  ENT: No sore throat. No ear pain.  Cardiovascular:  Palpitations  Respiratory:  Shortness of breath  GI: No  abdominal pain.  No nausea or vomiting.  Genitourinary: No abnormal urination.  Neurologic: No headache. No focal weakness.  No numbness.  MSK: no back pain.  Integument: No rashes or lesions.  Hematologic: No easy bruising.  Endocrine: No excessive thirst or urination.    Physical Exam:    /85   Pulse 87   Temp 98.9 °F (37.2 °C) (Oral)   Resp 18   Ht 5' 3" (1.6 " m)   Wt 49 kg (108 lb)   LMP 02/24/2023 (Exact Date)   SpO2 100%   BMI 19.13 kg/m²   Vitals:    03/20/23 1550 03/20/23 1605 03/20/23 1613 03/20/23 1658   BP: (!) 105/58 (!) 97/56 (!) 99/55 123/64   Pulse: 81 87 87 87   Resp: 16 16 16 16   Temp: 99.1 °F (37.3 °C) 99.2 °F (37.3 °C) 99.3 °F (37.4 °C) 98.8 °F (37.1 °C)   TempSrc: Oral Oral Oral Oral   SpO2: 100% 100% 100% 100%   Weight:       Height:        03/20/23 1824   BP: 130/85   Pulse: 87   Resp: 18   Temp: 98.9 °F (37.2 °C)   TempSrc: Oral   SpO2: 100%   Weight:    Height:        Nursing note and vital signs reviewed.  Constitutional: No acute distress.  Well-appearing, non-toxic.  Pale-appearing  Eyes: No conjunctival injection.  Extraocular muscles are intact.  ENT: Oropharynx clear.  Mucous membranes are pink and moist.  Cardiovascular:  Regular rate and rhythm no murmurs gallops or rubs.  Chest/ Respiratory:  Clear to auscultation bilaterally without wheezing rhonchi or rales.  No chest wall tenderness, crepitus, bruising.    Abdomen: Soft.  Not distended.  Nontender.  No guarding.  No rebound. Non-peritoneal.  Musculoskeletal: Good range of motion all joints.  No deformities.  Neck supple.  No meningismus.  Skin: No rashes seen.  Good turgor.  No abrasions.  No ecchymoses.  Neuro: alert and oriented x3,  no focal neurological deficits.  Psych: Appropriate, conversant    Initial MDM:  17-year-old female with heavy vaginal bleeding from known fibroids was sent by her OBGYN to receive a unit of blood prior to a surgery tomorrow.  She had outpatient labs done 3 days ago which revealed a hemoglobin of 7.  Anesthesiology also recommended iron infusion prior to surgery tomorrow.  Will proceed with lab work, type and screen and transfusion of 1 unit of packed red blood cells.  Will reach out to hematology for recommendations on iron infusions.    Labs that have been ordered have been independently reviewed and interpreted by myself.    Labs Reviewed   CBC W/  AUTO DIFFERENTIAL - Abnormal; Notable for the following components:       Result Value    RBC 3.96 (*)     Hemoglobin 7.3 (*)     Hematocrit 26.7 (*)     MCV 67 (*)     MCH 18.4 (*)     MCHC 27.3 (*)     RDW 16.8 (*)     All other components within normal limits   COMPREHENSIVE METABOLIC PANEL - Abnormal; Notable for the following components:    Potassium 3.3 (*)     CO2 21 (*)     ALT 8 (*)     All other components within normal limits   IRON AND TIBC - Abnormal; Notable for the following components:    Iron 10 (*)     TIBC 555 (*)     Saturated Iron 2 (*)     All other components within normal limits   PREPARE RBC SOFT       No orders to display       No results found for this or any previous visit.    Differential Diagnosis:  Differential Diagnosis includes, but is not limited to:  ACS/MI, PE, aortic dissection, pneumothorax, cardiac tamponade, pericarditis/myocarditis, pneumonia, infection/abscess, lung mass, costochondritis/pleurisy, GERD, biliary disease, pancreatitis    MDM  17 y.o. female with outpatient labs that showed a hemoglobin of 7 3 days ago.  She is scheduled for a myomectomy tomorrow however they were concerned due to low H&H and iron-deficiency.  Sent for transfusion of blood.  Upon arrival she was pale appearing, reporting fatigue and mild shortness of breath.  Type and screen was obtained patient was given 1 unit of packed red blood cells.  I also reached out to hematology who recommended 300 mg of Venofer which was also infused.  Hematology schedule the patient for an outpatient appointment next week.  I spoke with Dr. Dang who states after the infusion she is able for discharge home and can have her surgery tomorrow.  She is to be NPO after 9:00 p.m. tonight.  Discussed all the findings and results with the patient and needs close follow-up with her OBGYN after surgery tomorrow.  Patient reporting significant improvement in symptoms after blood and iron infusions.    ED Course as of 03/20/23  2012   Mon Mar 20, 2023   1704 Hemoglobin(!): 7.3 [AS]   1705 Hematocrit(!): 26.7 [AS]      ED Course User Index  [AS] OPAL Mobley       Diagnostic Impression:    1. Symptomatic anemia    2. Fibroids    3. Iron deficiency         ED Disposition Condition    Discharge Stable            ED Prescriptions    None       Follow-up Information       Follow up With Specialties Details Why Contact Info    OBGYN  Schedule an appointment as soon as possible for a visit                OPAL Mobley  03/20/23 2012

## 2023-03-20 NOTE — TELEPHONE ENCOUNTER
Called patient to discuss lab results. Hemoglobin 7.0. Discussed with pre-admit. Dr. Valenzuela recommends starting iron infusions and postponing the surgery until hemoglobin rises. Patient does not want to delay surgery. Also discussed blood transfusion prior to surgery. She would like to pursue this option. Will try and arrange outpatient transfusion. Otherwise she may need to go to the ED. All questions answered.       Katja Dang MD

## 2023-03-20 NOTE — ED NOTES
LOC: The patient is awake, alert, and oriented to self, place, time, and situation. Pt is calm and cooperative. Affect is appropriate.  Speech is appropriate and clear.     APPEARANCE: Patient resting comfortably in no acute distress.  Patient is clean and well groomed. Pt's mother is at bedside.    SKIN: The skin is warm and dry; and pale.  Patient has normal skin turgor and moist mucus membranes.  Skin intact; no breakdown or bruising noted.     MUSCULOSKELETAL: Patient moving upper and lower extremities without difficulty; denies pain in the extremities or back.  Denies weakness.     RESPIRATORY: Airway is open and patent. Respirations spontaneous, even, easy, and non-labored.  Patient has a normal effort and rate.  No accessory muscle use noted. Denies cough.     CARDIAC:  Normal rate noted.  No peripheral edema noted. No complaints of chest pain.     ABDOMEN: Soft and non tender to palpation.  No distention noted. Pt denies abdominal pain; denies nausea, vomiting, diarrhea, or constipation.    NEUROLOGIC: Eyes open spontaneously.  Behavior appropriate to situation.  Follows commands; facial expression symmetrical.  Purposeful motor response noted; normal sensation in all extremities. Pt denies headache; denies lightheadedness or dizziness; denies visual disturbances; denies loss of balance; denies unilateral weakness.

## 2023-03-20 NOTE — H&P (VIEW-ONLY)
History & Physical  Gynecology      SUBJECTIVE:     Chief Complaint: Pre-op Exam       History of Present Illness:  Petra Medina is a 17 y.o. F who presents for pre-op visit prior to dx lap/hscope/myomectomy/D&C on 3/21/2023. Her mom is with her today. No major changes since last visit. Still having some bleeding although not as heavy was it was before. She has been taking her iron pills.       Review of patient's allergies indicates:   Allergen Reactions    Cherries      unknown    Lavender      unknown       Past Medical History:   Diagnosis Date    ADD (attention deficit disorder with hyperactivity)     Anemia, unspecified      History reviewed. No pertinent surgical history.  OB History          0    Para   0    Term   0       0    AB   0    Living   0         SAB   0    IAB   0    Ectopic   0    Multiple   0    Live Births   0               Family History   Problem Relation Age of Onset    Heart attacks under age 50 Neg Hx     Early death Neg Hx     Congenital heart disease Neg Hx     Breast cancer Neg Hx     Colon cancer Neg Hx     Ovarian cancer Neg Hx      Social History     Tobacco Use    Smoking status: Never    Smokeless tobacco: Never    Tobacco comments:     VAPING   Substance Use Topics    Alcohol use: No     Comment: SOCIAL    Drug use: No       Current Outpatient Medications   Medication Sig    ferrous sulfate 134 mg (27 mg iron) Tab Take 1 tablet by mouth.    ibuprofen (ADVIL,MOTRIN) 200 MG tablet Take 400 mg by mouth.    medroxyPROGESTERone (DEPO-PROVERA) 150 mg/mL Syrg Inject 150 mg into the muscle every 3 (three) months.    prenatal vit-iron fum-folic ac 27 mg iron- 0.8 mg Tab Take 1 tablet by mouth.    HYDROcodone-acetaminophen (NORCO) 5-325 mg per tablet Take 1 tablet by mouth.     No current facility-administered medications for this visit.         Review of Systems:  Review of Systems     OBJECTIVE:     Physical Exam:  Physical Exam      ASSESSMENT:       ICD-10-CM  ICD-9-CM    1. Fibroids  D21.9 215.9       2. Abnormal uterine bleeding  N93.9 626.9       3. Iron deficiency anemia due to chronic blood loss  D50.0 280.0              Plan:      -- Reviewed surgical plan. Dx lap to survey for endometriosis lesions. Hysteroscopy with myomectomy and D&C as feasible. Will not plan on abdominal or laparoscopic myomectomy even if that means there will be some residual fibroid. R/B/A discussed. Consents signed.  -- Recommend pre-op labs given continued bleeding, even though it has been light.         Katja Dang MD

## 2023-03-21 ENCOUNTER — ANESTHESIA (OUTPATIENT)
Dept: SURGERY | Facility: OTHER | Age: 18
End: 2023-03-21
Payer: COMMERCIAL

## 2023-03-21 ENCOUNTER — TELEPHONE (OUTPATIENT)
Dept: OBSTETRICS AND GYNECOLOGY | Facility: HOSPITAL | Age: 18
End: 2023-03-21
Payer: COMMERCIAL

## 2023-03-21 ENCOUNTER — HOSPITAL ENCOUNTER (OUTPATIENT)
Facility: OTHER | Age: 18
Discharge: HOME | End: 2023-03-21
Attending: STUDENT IN AN ORGANIZED HEALTH CARE EDUCATION/TRAINING PROGRAM | Admitting: STUDENT IN AN ORGANIZED HEALTH CARE EDUCATION/TRAINING PROGRAM
Payer: COMMERCIAL

## 2023-03-21 VITALS
OXYGEN SATURATION: 100 % | SYSTOLIC BLOOD PRESSURE: 115 MMHG | HEART RATE: 92 BPM | RESPIRATION RATE: 16 BRPM | TEMPERATURE: 98 F | DIASTOLIC BLOOD PRESSURE: 68 MMHG

## 2023-03-21 DIAGNOSIS — N93.9 ABNORMAL UTERINE BLEEDING (AUB): ICD-10-CM

## 2023-03-21 DIAGNOSIS — Z98.890 S/P LAPAROSCOPY: ICD-10-CM

## 2023-03-21 DIAGNOSIS — D50.0 IRON DEFICIENCY ANEMIA DUE TO CHRONIC BLOOD LOSS: Primary | ICD-10-CM

## 2023-03-21 LAB
ABO + RH BLD: NORMAL
B-HCG UR QL: NEGATIVE
BASOPHILS # BLD AUTO: 0.06 K/UL (ref 0–0.2)
BASOPHILS NFR BLD: 1 % (ref 0–1.9)
BLD GP AB SCN CELLS X3 SERPL QL: NORMAL
CTP QC/QA: YES
DIFFERENTIAL METHOD: ABNORMAL
EOSINOPHIL # BLD AUTO: 0.2 K/UL (ref 0–0.5)
EOSINOPHIL NFR BLD: 3.1 % (ref 0–8)
ERYTHROCYTE [DISTWIDTH] IN BLOOD BY AUTOMATED COUNT: 20.3 % (ref 11.5–14.5)
HCT VFR BLD AUTO: 30.9 % (ref 37–48.5)
HGB BLD-MCNC: 8.8 G/DL (ref 12–16)
IMM GRANULOCYTES # BLD AUTO: 0.01 K/UL (ref 0–0.04)
IMM GRANULOCYTES NFR BLD AUTO: 0.2 % (ref 0–0.5)
LYMPHOCYTES # BLD AUTO: 1.5 K/UL (ref 1–4.8)
LYMPHOCYTES NFR BLD: 23.7 % (ref 18–48)
MCH RBC QN AUTO: 20.7 PG (ref 27–31)
MCHC RBC AUTO-ENTMCNC: 28.5 G/DL (ref 32–36)
MCV RBC AUTO: 73 FL (ref 82–98)
MONOCYTES # BLD AUTO: 0.8 K/UL (ref 0.3–1)
MONOCYTES NFR BLD: 12.9 % (ref 4–15)
NEUTROPHILS # BLD AUTO: 3.6 K/UL (ref 1.8–7.7)
NEUTROPHILS NFR BLD: 59.1 % (ref 38–73)
NRBC BLD-RTO: 0 /100 WBC
PLATELET # BLD AUTO: 246 K/UL (ref 150–450)
PMV BLD AUTO: ABNORMAL FL (ref 9.2–12.9)
RBC # BLD AUTO: 4.25 M/UL (ref 4–5.4)
WBC # BLD AUTO: 6.11 K/UL (ref 3.9–12.7)

## 2023-03-21 PROCEDURE — 25000003 PHARM REV CODE 250: Performed by: STUDENT IN AN ORGANIZED HEALTH CARE EDUCATION/TRAINING PROGRAM

## 2023-03-21 PROCEDURE — 25000003 PHARM REV CODE 250: Performed by: ANESTHESIOLOGY

## 2023-03-21 PROCEDURE — 36000709 HC OR TIME LEV III EA ADD 15 MIN: Performed by: STUDENT IN AN ORGANIZED HEALTH CARE EDUCATION/TRAINING PROGRAM

## 2023-03-21 PROCEDURE — 86900 BLOOD TYPING SEROLOGIC ABO: CPT | Performed by: STUDENT IN AN ORGANIZED HEALTH CARE EDUCATION/TRAINING PROGRAM

## 2023-03-21 PROCEDURE — 85025 COMPLETE CBC W/AUTO DIFF WBC: CPT | Performed by: STUDENT IN AN ORGANIZED HEALTH CARE EDUCATION/TRAINING PROGRAM

## 2023-03-21 PROCEDURE — 63600175 PHARM REV CODE 636 W HCPCS: Performed by: ANESTHESIOLOGY

## 2023-03-21 PROCEDURE — 88305 TISSUE EXAM BY PATHOLOGIST: CPT | Performed by: PATHOLOGY

## 2023-03-21 PROCEDURE — 25000242 PHARM REV CODE 250 ALT 637 W/ HCPCS: Performed by: NURSE ANESTHETIST, CERTIFIED REGISTERED

## 2023-03-21 PROCEDURE — 37000009 HC ANESTHESIA EA ADD 15 MINS: Performed by: STUDENT IN AN ORGANIZED HEALTH CARE EDUCATION/TRAINING PROGRAM

## 2023-03-21 PROCEDURE — 88305 TISSUE EXAM BY PATHOLOGIST: ICD-10-PCS | Mod: 26,,, | Performed by: PATHOLOGY

## 2023-03-21 PROCEDURE — C1782 MORCELLATOR: HCPCS | Performed by: STUDENT IN AN ORGANIZED HEALTH CARE EDUCATION/TRAINING PROGRAM

## 2023-03-21 PROCEDURE — 58561 HYSTEROSCOPY REMOVE MYOMA: CPT | Mod: ,,, | Performed by: STUDENT IN AN ORGANIZED HEALTH CARE EDUCATION/TRAINING PROGRAM

## 2023-03-21 PROCEDURE — 37000008 HC ANESTHESIA 1ST 15 MINUTES: Performed by: STUDENT IN AN ORGANIZED HEALTH CARE EDUCATION/TRAINING PROGRAM

## 2023-03-21 PROCEDURE — 36000708 HC OR TIME LEV III 1ST 15 MIN: Performed by: STUDENT IN AN ORGANIZED HEALTH CARE EDUCATION/TRAINING PROGRAM

## 2023-03-21 PROCEDURE — 36415 COLL VENOUS BLD VENIPUNCTURE: CPT | Performed by: STUDENT IN AN ORGANIZED HEALTH CARE EDUCATION/TRAINING PROGRAM

## 2023-03-21 PROCEDURE — 71000016 HC POSTOP RECOV ADDL HR: Performed by: STUDENT IN AN ORGANIZED HEALTH CARE EDUCATION/TRAINING PROGRAM

## 2023-03-21 PROCEDURE — 81025 URINE PREGNANCY TEST: CPT | Performed by: ANESTHESIOLOGY

## 2023-03-21 PROCEDURE — 58561 PR HYSTEROSCOPY,RMV MYOMA: ICD-10-PCS | Mod: ,,, | Performed by: STUDENT IN AN ORGANIZED HEALTH CARE EDUCATION/TRAINING PROGRAM

## 2023-03-21 PROCEDURE — 71000015 HC POSTOP RECOV 1ST HR: Performed by: STUDENT IN AN ORGANIZED HEALTH CARE EDUCATION/TRAINING PROGRAM

## 2023-03-21 PROCEDURE — 27201423 OPTIME MED/SURG SUP & DEVICES STERILE SUPPLY: Performed by: STUDENT IN AN ORGANIZED HEALTH CARE EDUCATION/TRAINING PROGRAM

## 2023-03-21 PROCEDURE — 25000003 PHARM REV CODE 250: Performed by: NURSE ANESTHETIST, CERTIFIED REGISTERED

## 2023-03-21 PROCEDURE — 71000039 HC RECOVERY, EACH ADD'L HOUR: Performed by: STUDENT IN AN ORGANIZED HEALTH CARE EDUCATION/TRAINING PROGRAM

## 2023-03-21 PROCEDURE — 71000033 HC RECOVERY, INTIAL HOUR: Performed by: STUDENT IN AN ORGANIZED HEALTH CARE EDUCATION/TRAINING PROGRAM

## 2023-03-21 PROCEDURE — 63600175 PHARM REV CODE 636 W HCPCS: Performed by: NURSE ANESTHETIST, CERTIFIED REGISTERED

## 2023-03-21 PROCEDURE — 88305 TISSUE EXAM BY PATHOLOGIST: CPT | Mod: 26,,, | Performed by: PATHOLOGY

## 2023-03-21 RX ORDER — SODIUM CHLORIDE 0.9 % (FLUSH) 0.9 %
3 SYRINGE (ML) INJECTION
Status: DISCONTINUED | OUTPATIENT
Start: 2023-03-21 | End: 2023-03-21 | Stop reason: HOSPADM

## 2023-03-21 RX ORDER — HYDROMORPHONE HYDROCHLORIDE 2 MG/ML
0.4 INJECTION, SOLUTION INTRAMUSCULAR; INTRAVENOUS; SUBCUTANEOUS EVERY 5 MIN PRN
Status: DISCONTINUED | OUTPATIENT
Start: 2023-03-21 | End: 2023-03-21 | Stop reason: HOSPADM

## 2023-03-21 RX ORDER — LIDOCAINE HCL/PF 100 MG/5ML
SYRINGE (ML) INTRAVENOUS
Status: DISCONTINUED | OUTPATIENT
Start: 2023-03-21 | End: 2023-03-21

## 2023-03-21 RX ORDER — DIPHENHYDRAMINE HYDROCHLORIDE 50 MG/ML
25 INJECTION INTRAMUSCULAR; INTRAVENOUS EVERY 6 HOURS PRN
Status: DISCONTINUED | OUTPATIENT
Start: 2023-03-21 | End: 2023-03-21 | Stop reason: HOSPADM

## 2023-03-21 RX ORDER — DEXTROMETHORPHAN HYDROBROMIDE, GUAIFENESIN 5; 100 MG/5ML; MG/5ML
650 LIQUID ORAL
Qty: 30 TABLET | Refills: 1 | Status: SHIPPED | OUTPATIENT
Start: 2023-03-21 | End: 2023-07-11 | Stop reason: CLARIF

## 2023-03-21 RX ORDER — ROCURONIUM BROMIDE 10 MG/ML
INJECTION, SOLUTION INTRAVENOUS
Status: DISCONTINUED | OUTPATIENT
Start: 2023-03-21 | End: 2023-03-21

## 2023-03-21 RX ORDER — MIDAZOLAM HYDROCHLORIDE 1 MG/ML
INJECTION INTRAMUSCULAR; INTRAVENOUS
Status: DISCONTINUED | OUTPATIENT
Start: 2023-03-21 | End: 2023-03-21

## 2023-03-21 RX ORDER — SODIUM CHLORIDE 9 MG/ML
INJECTION, SOLUTION INTRAVENOUS CONTINUOUS
Status: DISCONTINUED | OUTPATIENT
Start: 2023-03-21 | End: 2023-03-21 | Stop reason: HOSPADM

## 2023-03-21 RX ORDER — SENNOSIDES 8.6 MG/1
1 TABLET ORAL DAILY
Qty: 60 TABLET | Refills: 1 | Status: SHIPPED | OUTPATIENT
Start: 2023-03-21 | End: 2023-07-11 | Stop reason: CLARIF

## 2023-03-21 RX ORDER — ACETAMINOPHEN 500 MG
1000 TABLET ORAL
Status: COMPLETED | OUTPATIENT
Start: 2023-03-21 | End: 2023-03-21

## 2023-03-21 RX ORDER — ALBUTEROL SULFATE 90 UG/1
AEROSOL, METERED RESPIRATORY (INHALATION)
Status: DISCONTINUED | OUTPATIENT
Start: 2023-03-21 | End: 2023-03-21

## 2023-03-21 RX ORDER — ONDANSETRON 2 MG/ML
INJECTION INTRAMUSCULAR; INTRAVENOUS
Status: DISCONTINUED | OUTPATIENT
Start: 2023-03-21 | End: 2023-03-21

## 2023-03-21 RX ORDER — ONDANSETRON 8 MG/1
8 TABLET, ORALLY DISINTEGRATING ORAL ONCE
Status: COMPLETED | OUTPATIENT
Start: 2023-03-21 | End: 2023-03-21

## 2023-03-21 RX ORDER — PHENYLEPHRINE HYDROCHLORIDE 10 MG/ML
INJECTION INTRAVENOUS
Status: DISCONTINUED | OUTPATIENT
Start: 2023-03-21 | End: 2023-03-21

## 2023-03-21 RX ORDER — FENTANYL CITRATE 50 UG/ML
INJECTION, SOLUTION INTRAMUSCULAR; INTRAVENOUS
Status: DISCONTINUED | OUTPATIENT
Start: 2023-03-21 | End: 2023-03-21

## 2023-03-21 RX ORDER — IBUPROFEN 600 MG/1
600 TABLET ORAL EVERY 6 HOURS PRN
Qty: 30 TABLET | Refills: 1 | Status: SHIPPED | OUTPATIENT
Start: 2023-03-21 | End: 2023-05-15 | Stop reason: SDUPTHER

## 2023-03-21 RX ORDER — LIDOCAINE HYDROCHLORIDE 10 MG/ML
0.5 INJECTION, SOLUTION EPIDURAL; INFILTRATION; INTRACAUDAL; PERINEURAL ONCE
Status: DISCONTINUED | OUTPATIENT
Start: 2023-03-21 | End: 2023-03-21 | Stop reason: HOSPADM

## 2023-03-21 RX ORDER — PROPOFOL 10 MG/ML
VIAL (ML) INTRAVENOUS
Status: DISCONTINUED | OUTPATIENT
Start: 2023-03-21 | End: 2023-03-21

## 2023-03-21 RX ORDER — DEXAMETHASONE SODIUM PHOSPHATE 4 MG/ML
INJECTION, SOLUTION INTRA-ARTICULAR; INTRALESIONAL; INTRAMUSCULAR; INTRAVENOUS; SOFT TISSUE
Status: DISCONTINUED | OUTPATIENT
Start: 2023-03-21 | End: 2023-03-21

## 2023-03-21 RX ORDER — SODIUM CHLORIDE, SODIUM LACTATE, POTASSIUM CHLORIDE, CALCIUM CHLORIDE 600; 310; 30; 20 MG/100ML; MG/100ML; MG/100ML; MG/100ML
INJECTION, SOLUTION INTRAVENOUS CONTINUOUS
Status: DISCONTINUED | OUTPATIENT
Start: 2023-03-21 | End: 2023-03-21 | Stop reason: HOSPADM

## 2023-03-21 RX ORDER — OXYCODONE HYDROCHLORIDE 5 MG/1
5 TABLET ORAL EVERY 4 HOURS PRN
Qty: 10 TABLET | Refills: 0 | Status: SHIPPED | OUTPATIENT
Start: 2023-03-21 | End: 2023-06-22

## 2023-03-21 RX ORDER — OXYCODONE HYDROCHLORIDE 5 MG/1
5 TABLET ORAL
Status: DISCONTINUED | OUTPATIENT
Start: 2023-03-21 | End: 2023-03-21 | Stop reason: HOSPADM

## 2023-03-21 RX ORDER — MEPERIDINE HYDROCHLORIDE 25 MG/ML
12.5 INJECTION INTRAMUSCULAR; INTRAVENOUS; SUBCUTANEOUS ONCE AS NEEDED
Status: DISCONTINUED | OUTPATIENT
Start: 2023-03-21 | End: 2023-03-21 | Stop reason: HOSPADM

## 2023-03-21 RX ORDER — PROCHLORPERAZINE EDISYLATE 5 MG/ML
5 INJECTION INTRAMUSCULAR; INTRAVENOUS EVERY 30 MIN PRN
Status: DISCONTINUED | OUTPATIENT
Start: 2023-03-21 | End: 2023-03-21 | Stop reason: HOSPADM

## 2023-03-21 RX ORDER — KETAMINE HCL IN 0.9 % NACL 50 MG/5 ML
SYRINGE (ML) INTRAVENOUS
Status: DISCONTINUED | OUTPATIENT
Start: 2023-03-21 | End: 2023-03-21

## 2023-03-21 RX ORDER — BUPIVACAINE HYDROCHLORIDE 2.5 MG/ML
INJECTION, SOLUTION EPIDURAL; INFILTRATION; INTRACAUDAL
Status: DISCONTINUED | OUTPATIENT
Start: 2023-03-21 | End: 2023-03-21 | Stop reason: HOSPADM

## 2023-03-21 RX ORDER — MUPIROCIN 20 MG/G
OINTMENT TOPICAL
Status: DISCONTINUED | OUTPATIENT
Start: 2023-03-21 | End: 2023-03-21 | Stop reason: HOSPADM

## 2023-03-21 RX ADMIN — SODIUM CHLORIDE, SODIUM LACTATE, POTASSIUM CHLORIDE, AND CALCIUM CHLORIDE: 600; 310; 30; 20 INJECTION, SOLUTION INTRAVENOUS at 11:03

## 2023-03-21 RX ADMIN — FENTANYL CITRATE 50 MCG: 0.05 INJECTION, SOLUTION INTRAMUSCULAR; INTRAVENOUS at 11:03

## 2023-03-21 RX ADMIN — ONDANSETRON HYDROCHLORIDE 4 MG: 2 INJECTION INTRAMUSCULAR; INTRAVENOUS at 11:03

## 2023-03-21 RX ADMIN — DEXAMETHASONE SODIUM PHOSPHATE 8 MG: 4 INJECTION, SOLUTION INTRAMUSCULAR; INTRAVENOUS at 11:03

## 2023-03-21 RX ADMIN — PHENYLEPHRINE HYDROCHLORIDE 100 MCG: 10 INJECTION INTRAVENOUS at 11:03

## 2023-03-21 RX ADMIN — FENTANYL CITRATE 100 MCG: 0.05 INJECTION, SOLUTION INTRAMUSCULAR; INTRAVENOUS at 11:03

## 2023-03-21 RX ADMIN — SUGAMMADEX 200 MG: 100 INJECTION, SOLUTION INTRAVENOUS at 12:03

## 2023-03-21 RX ADMIN — MIDAZOLAM HYDROCHLORIDE 2 MG: 1 INJECTION, SOLUTION INTRAMUSCULAR; INTRAVENOUS at 11:03

## 2023-03-21 RX ADMIN — ACETAMINOPHEN 1000 MG: 500 TABLET, FILM COATED ORAL at 09:03

## 2023-03-21 RX ADMIN — MUPIROCIN: 20 OINTMENT TOPICAL at 09:03

## 2023-03-21 RX ADMIN — OXYCODONE HYDROCHLORIDE 5 MG: 5 TABLET ORAL at 03:03

## 2023-03-21 RX ADMIN — FENTANYL CITRATE 50 MCG: 0.05 INJECTION, SOLUTION INTRAMUSCULAR; INTRAVENOUS at 01:03

## 2023-03-21 RX ADMIN — PROPOFOL 150 MG: 10 INJECTION, EMULSION INTRAVENOUS at 11:03

## 2023-03-21 RX ADMIN — ROCURONIUM BROMIDE 20 MG: 10 SOLUTION INTRAVENOUS at 11:03

## 2023-03-21 RX ADMIN — FENTANYL CITRATE 50 MCG: 0.05 INJECTION, SOLUTION INTRAMUSCULAR; INTRAVENOUS at 12:03

## 2023-03-21 RX ADMIN — HYDROMORPHONE HYDROCHLORIDE 0.4 MG: 2 INJECTION INTRAMUSCULAR; INTRAVENOUS; SUBCUTANEOUS at 02:03

## 2023-03-21 RX ADMIN — Medication 50 MG: at 11:03

## 2023-03-21 RX ADMIN — ALBUTEROL SULFATE 2 PUFF: 90 AEROSOL, METERED RESPIRATORY (INHALATION) at 01:03

## 2023-03-21 RX ADMIN — SODIUM CHLORIDE, SODIUM LACTATE, POTASSIUM CHLORIDE, AND CALCIUM CHLORIDE: 600; 310; 30; 20 INJECTION, SOLUTION INTRAVENOUS at 10:03

## 2023-03-21 RX ADMIN — Medication 50 MG: at 12:03

## 2023-03-21 RX ADMIN — LIDOCAINE HYDROCHLORIDE 100 MG: 20 INJECTION, SOLUTION INTRAVENOUS at 11:03

## 2023-03-21 RX ADMIN — ROCURONIUM BROMIDE 50 MG: 10 SOLUTION INTRAVENOUS at 11:03

## 2023-03-21 RX ADMIN — ONDANSETRON 8 MG: 8 TABLET, ORALLY DISINTEGRATING ORAL at 03:03

## 2023-03-21 NOTE — DISCHARGE INSTRUCTIONS
Anesthesia: After Your Surgery  Youve just had surgery. During surgery, you received medication called anesthesia to keep you comfortable and pain-free. After surgery, you may experience some pain or nausea. This is common. Here are some tips for feeling better and recovering after surgery.    Going home  Your doctor or nurse will show you how to take care of yourself when you go home. He or she will also answer your questions. Have an adult family member or friend drive you home. For the first 24 hours after your surgery:  Do not drive or use heavy equipment.  Do not make important decisions or sign legal documents.  Avoid alcohol.  Have someone stay with you, if needed. He or she can watch for problems and help keep you safe.  Take your time getting up from a seated or lying position. You may experience dizziness for 24 hours  Be sure to keep all follow-up appointments with your doctor. And rest after your procedure for as long as your doctor tells you to.    Coping with pain  If you have pain after surgery, pain medication will help you feel better. Take it as directed, before pain becomes severe. Also, ask your doctor or pharmacist about other ways to control pain, such as with heat, ice, and relaxation. And follow any other instructions your surgeon or nurse gives you.    URINARY RETENTION  Should you experience a decrease in your urine output or are unable to urinate following surgery, this can be due to the medications given during surgery.  We recommend you going to the nearest Emergency Department.    Tips for taking pain medication  To get the best relief possible, remember these points:  Pain medications can upset your stomach. Taking them with a little food may help.  Most pain relievers taken by mouth need at least 20 to 30 minutes to take effect.  Taking medication on a schedule can help you remember to take it. Try to time your medication so that you can take it before beginning an activity, such  as dressing, walking, or sitting down for dinner.  Constipation is a common side effect of pain medications. Contact your doctor before taking any medications like laxatives or stool softeners to help relieve constipation. Also ask about any dietary restrictions, because drinking lots of fluids and eating foods like fruits and vegetables that are high in fiber can also help. Remember, dont take laxatives unless your surgeon has prescribed them.  Mixing alcohol and pain medication can cause dizziness and slow your breathing. It can even be fatal. Dont drink alcohol while taking pain medication.  Pain medication can slow your reflexes. Dont drive or operate machinery while taking pain medication.  If your health care provider tells you to take acetaminophen to help relieve your pain, ask him or her how much you are supposed to take each day. (Acetaminophen is the generic name for Tylenol and other brand-name pain relievers.) Acetaminophen or other pain relievers may interact with your prescription medicines or other over-the-counter (OTC) drugs. Some prescription medications contain acetaminophen along with other active ingredients. Using both prescription and OTC acetaminophen for pain can cause you to overdose. The FDA recommends that you read the labels on your OTC medications carefully. This will help you to clearly understand the list of active ingredients, dosing instructions, and any warnings. It may also help you avoid taking too much acetaminophen. If you have questions or don't understand the information, ask your pharmacist or health care provider to explain it to you before you take the OTC medication.    Managing nausea  Some people have an upset stomach after surgery. This is often due to anesthesia, pain, pain medications, or the stress of surgery. The following tips will help you manage nausea and get good nutrition as you recover. If you were on a special diet before surgery, ask your doctor if you  should follow it during recovery. These tips may help:  Dont push yourself to eat. Your body will tell you when to eat and how much.  Start off with clear liquids and soup. They are easier to digest.  Progress to semi-solid foods (mashed potatoes, applesauce, and gelatin) as you feel ready.  Slowly move to solid foods. Dont eat fatty, rich, or spicy foods at first.  Dont force yourself to have three large meals a day. Instead, eat smaller amounts more often.  Take pain medications with a small amount of solid food, such as crackers or toast to avoid nausea.      Call your surgeon if    You feel too sleepy, dizzy, or groggy (medication may be too strong).  You have side effects like nausea, vomiting, or skin changes (rash, itching, or hives).   © 4183-1322 [employer]. [address] [place] [address]. All rights reserved. This information is not intended as a substitute for professional medical care. Always follow your healthcare professional's instructions.      Abdominal Laparoscopy Discharge Instructions      Activity  Limit your activity for 4-6 weeks.  Dont lift anything heavier than 5-10 pounds.  Avoid strenuous activities, such as mowing the lawn, vacuuming, or playing sports.  Limit your activity to short, slow walks. Gradually increase your pace and distance as you feel able.  Listen to your body. If an activity causes pain, stop.  Rest when you are tired.  Dont have sexual intercourse or use tampons or douches until your doctor says its safe to do so.    Home Care  Always keep your incision clean and dry  Shower as instructed in 24 hours. You may wash your incision gently with mild soap and warm water and pat dry.  Avoid tub baths, hot tubs and swimming pools until seen by your physician for a post-op follow up.  If you have steri strips they should fall off in 7-10 days.    If you have band aids covering your incisions change daily or when soiled.  The band aids may be  removed post op day 1 if they are clean and dry.  Take your medication exactly as instructed by your doctor.  Return to your diet as you feel able. Eat a healthy, well-balanced diet.  Avoid constipation.  Use laxatives, stool softeners, or enemas as directed by your doctor.  Eat more high-fiber foods.  Drink 6-8 glasses of water every day, unless directed otherwise.  Follow-Up  Make a follow-up appointment as directed by our staff.       When to Call Your Doctor  Call your doctor right away if you have any of the following:  Fever above 101.5°F  (38.6°C) or chills  Bright red vaginal bleeding or a foul-smelling discharge  Vaginal bleeding that soaks more than one sanitary pad per hour  Trouble urinating or burning sensation when you urinate  Severe abdominal pain or bloating  Redness, swelling, or drainage at your incision site  Shortness of breath        ________________________________________________________________      FOLLOW ANY INSTRUCTIONS GIVEN BY DR HOUGH

## 2023-03-21 NOTE — INTERVAL H&P NOTE
The patient has been examined and the H&P has been reviewed:    I concur with the findings and changes have been noted since the H&P was written: Patient presented to ED on 3/20 with symptomatic anemia and H/H 7.3/26.7. She received 1U pRBCs and Venofer infusion. She reports that symptoms have resolved and she is feeling well this AM. Repeat H/H with appropriate rise to 8.8/30.9 this AM. Will plan to proceed with surgery. 1U pRBCs held in event of bleeding.      Surgery risks, benefits and alternative options discussed and understood by patient/family. Consents previously discussed and signed in clinic, confirmed this AM. Will proceed to OR for diagnostic laparoscopy, hysteroscopy, possible hysteroscopic myomectomy, and D&C.      Active Hospital Problems    Diagnosis  POA    *Abnormal uterine bleeding (AUB) [N93.9]  Yes      Resolved Hospital Problems   No resolved problems to display.       Denita Alba M.D.  OB/GYN PGY-4

## 2023-03-21 NOTE — DISCHARGE SUMMARY
Discharge Summary  Gynecology    Admit Date: 3/21/2023    Discharge Date: 3/21/2023    Attending Physician: Katja Dang MD    Principal Diagnoses: Abnormal uterine bleeding (AUB)    Active Hospital Problems    Diagnosis  POA    *Abnormal uterine bleeding (AUB) [N93.9]  Yes    S/p diagnostic laparoscopy, hysteroscopy, hysteroscopic myomectomy 3/21/2023 [Z98.890]  Not Applicable      Resolved Hospital Problems   No resolved problems to display.       Procedures: Procedure(s) (LRB):  LAPAROSCOPY, DIAGNOSTIC (N/A)  MYOMECTOMY, HYSTEROSCOPIC (N/A)    Discharged Condition: good    Hospital Course:   Patient presented for scheduled procedure. Patient was passed back to OR for diagnostic laparoscopy, hysteroscopy,and hysteroscopic myomectomy 2/2 AUB, uterine leiomyoma, and dysmenorrhea. Please see operative report for further details. The patient tolerated procedure well and patient was taken to recovery in a stable condition.    Prior to discharge patient was able to void, ambulate, tolerate p.o. and pain was well controlled with p.o. medications. Patient was given routine post-operative instructions for which patient voiced understanding. Patient was subsequently discharged home with plans to follow up with Dr. Dang for post-operative care.    Consults: None    Significant Diagnostic Studies:  Recent Labs   Lab 03/17/23  1222 03/20/23  1514 03/21/23  0935   WBC 4.78 6.01 6.11   HGB 7.0* 7.3* 8.8*   HCT 26.3* 26.7* 30.9*   MCV 69* 67* 73*    344 246        Treatments:   1. Surgery as above    Disposition: Home or Self Care    Patient Instructions:   Current Discharge Medication List        START taking these medications    Details   acetaminophen (TYLENOL) 650 MG TbSR Take 1 tablet (650 mg total) by mouth every 6 to 8 hours as needed (Pain).  Qty: 30 tablet, Refills: 1      oxyCODONE (ROXICODONE) 5 MG immediate release tablet Take 1 tablet (5 mg total) by mouth every 4 (four) hours as needed for Pain.  Qty:  10 tablet, Refills: 0    Comments: Quantity prescribed more than 7 day supply? No      SENNA 8.6 mg tablet Take 1 tablet by mouth once daily.  Qty: 60 tablet, Refills: 1           CONTINUE these medications which have CHANGED    Details   ibuprofen (ADVIL,MOTRIN) 600 MG tablet Take 1 tablet (600 mg total) by mouth every 6 (six) hours as needed for Pain.  Qty: 30 tablet, Refills: 1           CONTINUE these medications which have NOT CHANGED    Details   ferrous sulfate 134 mg (27 mg iron) Tab Take 1 tablet by mouth.      prenatal vit-iron fum-folic ac 27 mg iron- 0.8 mg Tab Take 1 tablet by mouth.           STOP taking these medications       HYDROcodone-acetaminophen (NORCO) 5-325 mg per tablet Comments:   Reason for Stopping:         medroxyPROGESTERone (DEPO-PROVERA) 150 mg/mL Syrg Comments:   Reason for Stopping:               Discharge Procedure Orders   Diet general     Lifting restrictions   Order Comments: LIFTING:  No lifting greater than 15 pounds for six weeks.     PELVIC REST:  No douching, tampons, or intercourse for 6 weeks.     Other restrictions (specify):   Order Comments: DRIVING:  No driving while on narcotics. Driving may be resumed initially with a competent passenger one to two weeks after surgery if no longer taking narcotics.     EXERCISE:  For 2 weeks your exercise should be limited to walking. You may walk as far as you wish, as long as you increase your level of exertion gradually and avoid slippery surfaces. You may climb stairs as needed to get around, but should not use stair climbing for exercise.     Remove dressing in 24 hours   Order Comments: If you have a bandage on wound, you may remove it the day after dismissal.  If you had steri-strips remove them once they begin to peel off (usually 2 weeks). Keep incision clean and dry.  Inspect the incision daily for signs and symptoms of infection like redness, drainage, or swelling.     Call MD for:  temperature >100.4     Call MD for:   persistent nausea and vomiting     Call MD for:  severe uncontrolled pain     Call MD for:  difficulty breathing, headache or visual disturbances     Call MD for:  redness, tenderness, or signs of infection (pain, swelling, redness, odor or green/yellow discharge around incision site)     Call MD for:  hives     Call MD for:   Order Comments: Inability to void, urine is ketchup colored or you have large clots, vaginal bleeding is heavier than a period.    VAGINAL DISCHARGE: You may develop a vaginal discharge and intermittent vaginal spotting after surgery and up to 6 weeks postoperatively.  The discharge may have an odor and may change in color but it is normal.  This is due to dissolving stiches.  Contact your surgical team if you develop vaginal or vulvar irritation along with a discharge.  Also contact your surgical team if you have vaginal discharge that smells like urine or stool.    PAIN MEDICATIONS:   Take your pain medications as instructed. It is best to take pain medications before your pain becomes severe. This will allow you to take less medication yet have better pain relief. For the first 2 or 3 days it may be helpful to take your pain medications on a regular schedule (e.g. every 4 to 6 hours). This will help you to keep your pain under better control. You should then begin to take fewer medications each day until you no longer need them. Do not take pain medication on an empty stomach. This may lead to nausea and vomiting.    CONSTIPATION REMEDIES: Patients are often constipated after surgery or with use of oral narcotic medicine. You should continue to take the stool softener, Senokot-S during the next six weeks, and consume adequate amounts of water.  If you have not had a bowel movement for 3 days after dismissal, or are uncomfortable and unable to pass stool, please try one or all of the following measures:  1.  Milk of Magnesia - 30 cc by mouth every 12 hours   2.  Dulcolax suppository - One  suppository per rectum every 4-6 hours   3.  Metamucil, Fibercon or other bulk former - use as directed  4.  Prunes or Prune juice    If you continue to have constipation after trying the above remedies, you should contact your surgical team using the contact information listed above.     Call MD for:  persistent dizziness or light-headedness     Call MD for:  extreme fatigue     Activity as tolerated   Order Comments: Return to normal activity slowly as you feel able.  For 2 weeks your exercise should be limited to walking.  You may walk as far as you wish, as long as you increase your level of exertion gradually and avoid slippery surfaces.     Shower on day dressing removed (No bath)   Order Comments: You may shower at any time but should avoid immersing any abdominal incisions in water for at least 2 weeks after surgery or until the wound is completely healed.  If given, please shower with Hibaclens soap until bottle is completely finished.        Follow-up Information       Katja Dang MD. Schedule an appointment as soon as possible for a visit in 2 week(s).    Specialty: Obstetrics and Gynecology  Why: Post-operative Visit in 2-4 weeks.  Contact information:  [address] [place] [id number]  [phone]                           Denita Alba M.D.  OB/GYN PGY-4

## 2023-03-21 NOTE — ANESTHESIA PROCEDURE NOTES
Intubation    Date/Time: 3/21/2023 11:20 AM  Performed by: Esthela Fisher CRNA  Authorized by: Sheldon Hale MD     Intubation:     Induction:  Intravenous    Intubated:  Postinduction    Mask Ventilation:  Easy mask    Attempts:  1    Attempted By:  CRNA    Method of Intubation:  Video laryngoscopy    Blade:  Sauer 3    Laryngeal View Grade: Grade I - full view of cords      Difficult Airway Encountered?: No      Complications:  None    Airway Device:  Oral endotracheal tube    Airway Device Size:  7.0    Style/Cuff Inflation:  Cuffed (inflated to minimal occlusive pressure)    Tube secured:  20    Secured at:  The lips    Placement Verified By:  Capnometry    Complicating Factors:  None    Findings Post-Intubation:  BS equal bilateral and atraumatic/condition of teeth unchanged

## 2023-03-21 NOTE — ANESTHESIA POSTPROCEDURE EVALUATION
Anesthesia Post Evaluation    Patient: Petra Medina    Procedure(s) Performed: Procedure(s) (LRB):  LAPAROSCOPY, DIAGNOSTIC (N/A)  MYOMECTOMY, HYSTEROSCOPIC (N/A)    Final Anesthesia Type: general      Patient location during evaluation: PACU  Patient participation: Yes- Able to Participate  Level of consciousness: awake and alert  Post-procedure vital signs: reviewed and stable  Pain management: adequate  Airway patency: patent    PONV status at discharge: No PONV  Anesthetic complications: no      Cardiovascular status: blood pressure returned to baseline  Respiratory status: unassisted and spontaneous ventilation  Hydration status: euvolemic  Follow-up not needed.          Vitals Value Taken Time   /70 03/21/23 1340   Temp 36.2 °C (97.1 °F) 03/21/23 1324   Pulse 91 03/21/23 1342   Resp 18 03/21/23 1324   SpO2 100 % 03/21/23 1342   Vitals shown include unvalidated device data.      No case tracking events are documented in the log.      Pain/Julius Score: Pain Rating Prior to Med Admin: 0 (3/21/2023  9:37 AM)  Julius Score: 9 (3/21/2023  1:24 PM)

## 2023-03-21 NOTE — TRANSFER OF CARE
Anesthesia Transfer of Care Note    Patient: Petra Medina    Procedure(s) Performed: Procedure(s) (LRB):  LAPAROSCOPY, DIAGNOSTIC (N/A)  MYOMECTOMY, HYSTEROSCOPIC (N/A)    Patient location: PACU    Anesthesia Type: general    Transport from OR: Transported from OR on room air with adequate spontaneous ventilation    Post pain: adequate analgesia    Post assessment: no apparent anesthetic complications    Post vital signs: stable    Level of consciousness: awake and alert    Nausea/Vomiting: no nausea/vomiting    Complications: none    Transfer of care protocol was followed      Last vitals:   Visit Vitals  /69   Pulse 83   Temp 37.2 °C (98.9 °F) (Oral)   Resp 18   LMP 02/24/2023 (Exact Date)   SpO2 100%   Breastfeeding No

## 2023-03-21 NOTE — OP NOTE
OPERATIVE REPORT     PREOPERATIVE DIAGNOSIS  1. AUB  2. Uterine leiomyoma  3. Dysmenorrhea    POSTOPERATIVE DIAGNOSIS  1-3. Same  4. S/p diagnostic laparoscopy/hysteroscopy/hysteroscopic myomectomy     PROCEDURE:  Diagnostic laparoscopy  Diagnostic hysteroscopy   Hysteroscopic myomectomy     SURGEON: Katja Dang MD    ASSISTANT: Denita Alba MD (PGY-4)    ANESTHESIA: General     COMPLICATIONS: None    EBL: 10 cc     IV FLUIDS: 1400 cc    URINE OUTPUT: 150 cc drained via luis catheter during procedure    HYSTEROSCOPY FLUID DEFICIT: 2105 cc with 4:34 of resection time    FINDINGS:  Normal external genitalia, vagina, and cervix. Uncomplicated peritoneal entry. Uterus sounded to 6 cm, appeared boggy and enlarged on laparoscopy with no discrete fibroids visualized. Otherwise normal pelvic anatomy was noted without evidence of endometriosis or hydrosalpinx on careful inspection of the pelvis. Slight hyperemia noted to posterior cul-de-sac. Normal-appearing liver, gallbladder, and appendix. On hysteroscopy, large submucosal fibroid noted with wide base extending from posterior uterine wall to uterine fundus, almost encompassing entirety of uterine cavity. Vast majority of fibroid resected with MyoSure XL device. Excellent hemostasis at case conclusion. Intra-operative images, as below.    SPECIMENS:  1. Uterine fibroid, specimen to pathology     PROCEDURE IN DETAIL:   After proper consents were explained and obtained, patient was taken to the operating room where general anesthesia was administered and found to be adequate.  She was placed in the dorsal lithotomy position using Yellowfin stirrups, then prepped and draped in the usual sterile fashion.A surgical timeout was performed with patient's name, date of birth, procedure to be performed, and allergies verbalized. All OR staff in agreement. No antibiotics were administered as none were indicated. Luis catheter was placed prior to procedure in a sterile fashion.  A moist sponge-stick was placed into the vagina for uterine manipulation. Gloves were then changed to begin the laparoscopic portion of the case.     Perforating towel clamps were used to grasp the periumbilical skin. The Veress needle was introduced into the peritoneal cavity without difficulty. A saline drop test was preformed to validate intraperitoneal placement. The pneumoperitoneum was established with CO2 gas to a pressure of 15 mmHg. A 5 mm vertical incision was made at the superior aspect of the umbilicus. A 5 mm trocar was inserted into the abdomen under direct visualization. Intra-abdominal placement was confirmed with laparoscope. Patient was placed in Trendelenburg position to aid visualization. Intra-abdominal survey revealed normal appearing liver and gallbladder and lack of any visceral or vascular injury. A second 5 mm port was placed 2 cm above and 2 cm medial to the left anterior superior iliac spine under direct visualization. A third 5 mm port was placed 2 cm above and 2 cm medial to the right anterior superior iliac spine under direct visualization. Findings were noted, as described above.     All trocars were then removed under direct visualization. Hemostasis of incisions was obtained with monopolar cautery. Skin was closed with 4-0 Monocryl in a subcuticular fashion.    Attention was then turned to the vagina. [place] catheter was removed. Right angle retractors were placed vaginally to visualize the cervix. The anterior lip of the cervix was grasped with a single tooth tenaculum. The cervix was serially dilated to accommodate the 5 mm hysteroscope. Upon inspection of the uterine cavity, a large uterine fibroid was noted, as described above. Bilateral tubal ostia were visualized. An attempt was made to dilate to the cervix to accommodate the hysteroscopic resectoscope but this was unsuccessful due to tight, nulliparous cervix. Instead, the MyoSure XL device was introduced. This device was  engaged to resect the vast majority of the large submucosal fibroid. Decision was made to conclude resection with hysteroscopic deficit at approximately 2100 cc. The hysteroscope was then withdrawn from the uterus and specimen was sent to pathology. Tenaculum was removed from the cervix and two stitches of 2-0 vicryl were placed in a figure-of-8 fashion at each puncture site. Good hemostasis was noted.    A vaginal sweep was performed to ensure that all sponges and instruments were cleared from vagina. The patient tolerated the procedure well. All counts were correct x 2. The patient was taken to recovery area in stable condition.      Denita Alba M.D.  OB/GYN PGY-4

## 2023-03-21 NOTE — TELEPHONE ENCOUNTER
Patient received 1U pRBCs and Venofer infusion in ED yesterday. Symptomatic anemia improved this AM. Discussed case with anesthesia team. Will have patient come for repeat T&S w/ cross for 1-2 units and CBC this AM while in pre-op. If CBC has appropriate rise, will plan to proceed with surgery. Patient notified and expressed understanding.    Denita Alba M.D.  OB/GYN PGY-4

## 2023-03-21 NOTE — PLAN OF CARE
Petra Wolf Carol has met all discharge criteria from Phase II. Vital Signs are stable, ambulating  without difficulty.Pain is now under control and tolerable for the pt. Pain score 3 at this time.  Discharge instructions given, patient verbalized understanding. Discharged from facility via wheelchair in stable condition.

## 2023-03-22 ENCOUNTER — TELEPHONE (OUTPATIENT)
Dept: OBSTETRICS AND GYNECOLOGY | Facility: CLINIC | Age: 18
End: 2023-03-22
Payer: COMMERCIAL

## 2023-03-22 ENCOUNTER — PATIENT MESSAGE (OUTPATIENT)
Dept: OBSTETRICS AND GYNECOLOGY | Facility: CLINIC | Age: 18
End: 2023-03-22
Payer: COMMERCIAL

## 2023-03-22 NOTE — TELEPHONE ENCOUNTER
Called to check in after dx lap/hscope myomectomy on 3/21/2023. Left voicemail.    Katja Dang MD

## 2023-03-23 ENCOUNTER — TELEPHONE (OUTPATIENT)
Dept: OBSTETRICS AND GYNECOLOGY | Facility: CLINIC | Age: 18
End: 2023-03-23
Payer: COMMERCIAL

## 2023-03-23 DIAGNOSIS — D50.0 IRON DEFICIENCY ANEMIA DUE TO CHRONIC BLOOD LOSS: ICD-10-CM

## 2023-03-23 DIAGNOSIS — D21.9 FIBROIDS: Primary | ICD-10-CM

## 2023-03-23 DIAGNOSIS — N93.9 ABNORMAL UTERINE BLEEDING: ICD-10-CM

## 2023-03-23 NOTE — TELEPHONE ENCOUNTER
Called to check in after dx lap/hysterocopic myomectomy on 3/21/2023. She is doing well, bleeding is very light. Only using a panty liner. She is having some LLQ pain that radiates to her back. 3/10. Different from the shifting pain she was having yesterday that she attributing to pneumoperitoneum. Had bowel movement, is passing gas. Was able to go for a walk today. Surgical findings reviewed, all questions answered. She has an appt with heme/onc on 3/30, discussed they may recommend continued iron infusions. Will plan for post-op appt with me in 2-4 weeks. Plan to restart nuvaring at that time.      Katja Dang MD

## 2023-03-24 ENCOUNTER — TELEPHONE (OUTPATIENT)
Dept: OBSTETRICS AND GYNECOLOGY | Facility: CLINIC | Age: 18
End: 2023-03-24
Payer: COMMERCIAL

## 2023-03-27 LAB
FINAL PATHOLOGIC DIAGNOSIS: NORMAL
GROSS: NORMAL
Lab: NORMAL

## 2023-03-28 ENCOUNTER — LAB VISIT (OUTPATIENT)
Dept: LAB | Facility: HOSPITAL | Age: 18
End: 2023-03-28
Attending: STUDENT IN AN ORGANIZED HEALTH CARE EDUCATION/TRAINING PROGRAM
Payer: COMMERCIAL

## 2023-03-28 DIAGNOSIS — D21.9 FIBROIDS: ICD-10-CM

## 2023-03-28 DIAGNOSIS — N93.9 ABNORMAL UTERINE BLEEDING: ICD-10-CM

## 2023-03-28 DIAGNOSIS — D50.0 IRON DEFICIENCY ANEMIA DUE TO CHRONIC BLOOD LOSS: ICD-10-CM

## 2023-03-28 LAB
BASOPHILS # BLD AUTO: 0.04 K/UL (ref 0–0.2)
BASOPHILS NFR BLD: 0.8 % (ref 0–1.9)
DIFFERENTIAL METHOD: ABNORMAL
EOSINOPHIL # BLD AUTO: 0.2 K/UL (ref 0–0.5)
EOSINOPHIL NFR BLD: 3.7 % (ref 0–8)
ERYTHROCYTE [DISTWIDTH] IN BLOOD BY AUTOMATED COUNT: 26.4 % (ref 11.5–14.5)
HCT VFR BLD AUTO: 31.7 % (ref 37–48.5)
HGB BLD-MCNC: 9.1 G/DL (ref 12–16)
IMM GRANULOCYTES # BLD AUTO: 0.02 K/UL (ref 0–0.04)
IMM GRANULOCYTES NFR BLD AUTO: 0.4 % (ref 0–0.5)
LYMPHOCYTES # BLD AUTO: 1.6 K/UL (ref 1–4.8)
LYMPHOCYTES NFR BLD: 33.3 % (ref 18–48)
MCH RBC QN AUTO: 21.6 PG (ref 27–31)
MCHC RBC AUTO-ENTMCNC: 28.7 G/DL (ref 32–36)
MCV RBC AUTO: 75 FL (ref 82–98)
MONOCYTES # BLD AUTO: 0.5 K/UL (ref 0.3–1)
MONOCYTES NFR BLD: 9.9 % (ref 4–15)
NEUTROPHILS # BLD AUTO: 2.5 K/UL (ref 1.8–7.7)
NEUTROPHILS NFR BLD: 51.9 % (ref 38–73)
NRBC BLD-RTO: 0 /100 WBC
PLATELET # BLD AUTO: 212 K/UL (ref 150–450)
PMV BLD AUTO: ABNORMAL FL (ref 9.2–12.9)
RBC # BLD AUTO: 4.22 M/UL (ref 4–5.4)
WBC # BLD AUTO: 4.84 K/UL (ref 3.9–12.7)

## 2023-03-28 PROCEDURE — 85025 COMPLETE CBC W/AUTO DIFF WBC: CPT | Performed by: STUDENT IN AN ORGANIZED HEALTH CARE EDUCATION/TRAINING PROGRAM

## 2023-03-28 PROCEDURE — 36415 COLL VENOUS BLD VENIPUNCTURE: CPT | Performed by: STUDENT IN AN ORGANIZED HEALTH CARE EDUCATION/TRAINING PROGRAM

## 2023-03-30 ENCOUNTER — OFFICE VISIT (OUTPATIENT)
Dept: HEMATOLOGY/ONCOLOGY | Facility: CLINIC | Age: 18
End: 2023-03-30
Payer: COMMERCIAL

## 2023-03-30 DIAGNOSIS — D50.0 IRON DEFICIENCY ANEMIA DUE TO CHRONIC BLOOD LOSS: Primary | ICD-10-CM

## 2023-03-30 DIAGNOSIS — N93.9 ABNORMAL UTERINE BLEEDING (AUB): ICD-10-CM

## 2023-03-30 PROCEDURE — 99205 OFFICE O/P NEW HI 60 MIN: CPT | Mod: 95,,, | Performed by: STUDENT IN AN ORGANIZED HEALTH CARE EDUCATION/TRAINING PROGRAM

## 2023-03-30 PROCEDURE — 99205 PR OFFICE/OUTPT VISIT, NEW, LEVL V, 60-74 MIN: ICD-10-PCS | Mod: 95,,, | Performed by: STUDENT IN AN ORGANIZED HEALTH CARE EDUCATION/TRAINING PROGRAM

## 2023-03-30 RX ORDER — SODIUM CHLORIDE 9 MG/ML
INJECTION, SOLUTION INTRAVENOUS CONTINUOUS
Status: CANCELLED | OUTPATIENT
Start: 2023-03-30

## 2023-03-30 RX ORDER — SODIUM CHLORIDE 0.9 % (FLUSH) 0.9 %
10 SYRINGE (ML) INJECTION
Status: CANCELLED | OUTPATIENT
Start: 2023-03-30

## 2023-03-30 RX ORDER — HEPARIN 100 UNIT/ML
5 SYRINGE INTRAVENOUS
Status: CANCELLED | OUTPATIENT
Start: 2023-03-30

## 2023-03-30 NOTE — PROGRESS NOTES
Hematology- Oncology Clinic Note :         RFV / chief complaint- Anemia        HPI  Pt is a 17 y.o. female who  has a past medical history of ADD (attention deficit disorder with hyperactivity) and Anemia, unspecified.   Pt presents to the clinic today for anemia    C/o fatigue and LAKE  Onset of symptoms was gradual starting several weeks ago with unchanged course since that time. Symptoms are mild to moderate in intensity.  Patient denies chest pain, LOC, falls. Symptoms are aggravated by exertion. Symptoms improve with rest.   Pica- ice   Bleeding- HMB, fibroid. AUB x 4 months, has used TXA. Blood transfusion in March  Oral iron - causes constipation, taking stool softner  Appetite - good   Weight loss- none        Reviewed past medical/surgical/social history    Past Medical History:   Diagnosis Date    ADD (attention deficit disorder with hyperactivity)     Anemia, unspecified       Past Surgical History:   Procedure Laterality Date    DIAGNOSTIC LAPAROSCOPY N/A 3/21/2023    Procedure: LAPAROSCOPY, DIAGNOSTIC;  Surgeon: Katja Dang MD;  Location: [facility] OR;  Service: OB/GYN;  Laterality: N/A;    HYSTEROSCOPIC RESECTION OF MYOMA N/A 3/21/2023    Procedure: MYOMECTOMY, HYSTEROSCOPIC;  Surgeon: Katja Dang MD;  Location: [facility] OR;  Service: OB/GYN;  Laterality: N/A;      Review of patient's allergies indicates:   Allergen Reactions    Cherries      unknown    Lavender      unknown      Social History     Tobacco Use    Smoking status: Never    Smokeless tobacco: Never    Tobacco comments:     VAPING   Substance Use Topics    Alcohol use: No     Comment: SOCIAL      Family History   Problem Relation Age of Onset    Heart attacks under age 50 Neg Hx     Early death Neg Hx     Congenital heart disease Neg Hx     Breast cancer Neg Hx     Colon cancer Neg Hx     Ovarian cancer Neg Hx           Review of Systems :  Review of Systems   Constitutional:  Positive for malaise/fatigue. Negative for chills,  diaphoresis, fever and weight loss.   HENT: Negative.  Negative for congestion, hearing loss, nosebleeds, sore throat and tinnitus.    Eyes: Negative.  Negative for blurred vision and discharge.   Respiratory:  Negative for cough, hemoptysis, sputum production, shortness of breath and wheezing.    Cardiovascular: Negative.  Negative for chest pain, palpitations and leg swelling.   Gastrointestinal: Negative.  Negative for abdominal pain, blood in stool, constipation, diarrhea, heartburn, melena, nausea and vomiting.   Genitourinary: Negative.    Musculoskeletal: Negative.  Negative for back pain, falls, joint pain and myalgias.   Skin: Negative.  Negative for itching and rash.   Neurological: Negative.  Negative for dizziness, tingling, sensory change, speech change, focal weakness, seizures, loss of consciousness, weakness and headaches.   Endo/Heme/Allergies: Negative.  Does not bruise/bleed easily.   Psychiatric/Behavioral: Negative.  Negative for depression. The patient is not nervous/anxious and does not have insomnia.              Physical Exam :  LMP 02/24/2023 (Exact Date)   Wt Readings from Last 3 Encounters:   03/20/23 49 kg (108 lb) (12 %, Z= -1.16)*   03/17/23 49 kg (108 lb) (12 %, Z= -1.16)*   03/17/23 49.1 kg (108 lb 3.9 oz) (13 %, Z= -1.14)*     * Growth percentiles are based on CDC (Girls, 2-20 Years) data.       There is no height or weight on file to calculate BMI.      Physical Exam  Constitutional:       General: She is not in acute distress.     Appearance: Normal appearance. She is not ill-appearing.   HENT:      Head: Normocephalic and atraumatic.      Right Ear: External ear normal.      Left Ear: External ear normal.   Eyes:      General: No scleral icterus.        Right eye: No discharge.         Left eye: No discharge.   Pulmonary:      Effort: Pulmonary effort is normal. No respiratory distress.   Skin:     Coloration: Skin is not jaundiced.      Findings: No erythema or rash.    Neurological:      Mental Status: She is alert and oriented to person, place, and time. Mental status is at baseline.   Psychiatric:         Mood and Affect: Mood normal.         Speech: Speech normal.         Behavior: Behavior normal.         Current Outpatient Medications   Medication Sig Dispense Refill    acetaminophen (TYLENOL) 650 MG TbSR Take 1 tablet (650 mg total) by mouth every 6 to 8 hours as needed (Pain). 30 tablet 1    ferrous sulfate 134 mg (27 mg iron) Tab Take 1 tablet by mouth.      ibuprofen (ADVIL,MOTRIN) 600 MG tablet Take 1 tablet (600 mg total) by mouth every 6 (six) hours as needed for Pain. 30 tablet 1    oxyCODONE (ROXICODONE) 5 MG immediate release tablet Take 1 tablet (5 mg total) by mouth every 4 (four) hours as needed for Pain. 10 tablet 0    prenatal vit-iron fum-folic ac 27 mg iron- 0.8 mg Tab Take 1 tablet by mouth.      SENNA 8.6 mg tablet Take 1 tablet by mouth once daily. 60 tablet 1     No current facility-administered medications for this visit.       Pertinent Diagnostic studies:      Lab Visit on 03/28/2023   Component Date Value Ref Range Status    WBC 03/28/2023 4.84  3.90 - 12.70 K/uL Final    RBC 03/28/2023 4.22  4.00 - 5.40 M/uL Final    Hemoglobin 03/28/2023 9.1 (L)  12.0 - 16.0 g/dL Final    Hematocrit 03/28/2023 31.7 (L)  37.0 - 48.5 % Final    MCV 03/28/2023 75 (L)  82 - 98 fL Final    MCH 03/28/2023 21.6 (L)  27.0 - 31.0 pg Final    MCHC 03/28/2023 28.7 (L)  32.0 - 36.0 g/dL Final    RDW 03/28/2023 26.4 (H)  11.5 - 14.5 % Final    Platelets 03/28/2023 212  150 - 450 K/uL Final    MPV 03/28/2023 SEE COMMENT  9.2 - 12.9 fL Final    Result not available.    Immature Granulocytes 03/28/2023 0.4  0.0 - 0.5 % Final    Gran # (ANC) 03/28/2023 2.5  1.8 - 7.7 K/uL Final    Immature Grans (Abs) 03/28/2023 0.02  0.00 - 0.04 K/uL Final    Comment: Mild elevation in immature granulocytes is non specific and   can be seen in a variety of conditions including stress response,    acute inflammation, trauma and pregnancy. Correlation with other   laboratory and clinical findings is essential.      Lymph # 03/28/2023 1.6  1.0 - 4.8 K/uL Final    Mono # 03/28/2023 0.5  0.3 - 1.0 K/uL Final    Eos # 03/28/2023 0.2  0.0 - 0.5 K/uL Final    Baso # 03/28/2023 0.04  0.00 - 0.20 K/uL Final    nRBC 03/28/2023 0  0 /100 WBC Final    Gran % 03/28/2023 51.9  38.0 - 73.0 % Final    Lymph % 03/28/2023 33.3  18.0 - 48.0 % Final    Mono % 03/28/2023 9.9  4.0 - 15.0 % Final    Eosinophil % 03/28/2023 3.7  0.0 - 8.0 % Final    Basophil % 03/28/2023 0.8  0.0 - 1.9 % Final    Differential Method 03/28/2023 Automated   Final           Oncology History    No history exists.     Assessment :       1. Iron deficiency anemia due to chronic blood loss    2. Abnormal uterine bleeding (AUB)        Plan :       Iron def anemia due to chronic blood loss . HMB  Intolerant to oral iron due to gi side effects.   injectafer 2 doses 1 week apart  RTC 3- 4 months with repeat labs.   Hand out on iron def and iron rich foods given to pt.   Continue f/u with obgyn for HMB    Discussed side effects of Injectafer (or venofer) which include but are not limited to infusion reaction, shortness of breath, hives, rash, flushing, itching, headaches, skin discoloration at the injection site, nausea, vomiting, low phosphorus level, elevated blood pressure, elevated liver enzymes, risks to the unborn baby if pregnant. Patient understands the risks and agrees with proceeding with Injectafer (or venofer).     Route Chart for Scheduling  Med Onc Route Chart for Scheduling      Therapy Plan Information  Medications  FERUMOXYTOL 510 MG/117 ML D5W (READY TO MIX SYSTEM) IVPB 510 mg  510 mg, Intravenous, 1 time a week  Flushes  heparin, porcine (PF) 100 unit/mL injection flush 500 Units  500 Units, Intravenous, PRN  sodium chloride 0.9% flush 10 mL  10 mL, Intravenous, Every visit  sodium chloride 0.9% 100 mL flush bag  Intravenous, Every  visit  PRN Medications  EPINEPHrine (EPIPEN) 0.3 mg/0.3 mL pen injection 0.3 mg  0.3 mg, Intramuscular, PRN  diphenhydrAMINE injection 50 mg  50 mg, Intravenous, PRN  methylPREDNISolone sodium succinate injection 125 mg  125 mg, Intravenous, PRN  sodium chloride 0.9% bolus 1,000 mL 1,000 mL  1,000 mL, Intravenous, PRN        Electronically signed by Regina Sanches    [facility]      Future Appointments   Date Time Provider Department Center   4/12/2023  3:15 PM Katja Dang MD [facility] [id number]   7/26/2023 11:30 AM LAB, [facility] LAB [facility]   7/31/2023  3:00 PM Regina Sanches MD [facility] HEM ONC [facility]       The patient location is: [place]  The chief complaint leading to consultation is: see chief complaint below    Visit type: audiovisual    Face to Face time with patient: about 15 mins      Each patient to whom he or she provides medical services by telemedicine is:  (1) informed of the relationship between the physician and patient and the respective role of any other health care provider with respect to management of the patient; and (2) notified that he or she may decline to receive medical services by telemedicine and may withdraw from such care at any time.    MDM includes  :      One or more chronic illness with exacerbation,  progression  2 or more stable chronic illnesses  1 or more chronic illness with severe exacerbation, progression or side effects of treatment    Independent review and explanation of 3+ results from unique tests  Discussion of management and ordering 3+ unique tests  Independent interpretation of test completed by another healthcare professional   Discussion of management or test interpretation with another healthcare professional     Prescription drug management  Drug therapy requiring intensive monitoring  for toxicity  Decision regarding hospitalizations      This note was created with voice recognition software.  Grammatical, syntax  and spelling errors may be inevitable.

## 2023-03-30 NOTE — Clinical Note
Injectafer x 2 or venofer 300 x 5 at [facility]. F/u on PA RTC 4 months with labs 3-7 days prior

## 2023-04-12 ENCOUNTER — OFFICE VISIT (OUTPATIENT)
Dept: OBSTETRICS AND GYNECOLOGY | Facility: CLINIC | Age: 18
End: 2023-04-12
Payer: COMMERCIAL

## 2023-04-12 VITALS — HEIGHT: 63 IN | BODY MASS INDEX: 19.14 KG/M2 | WEIGHT: 108 LBS

## 2023-04-12 DIAGNOSIS — D21.9 FIBROIDS: ICD-10-CM

## 2023-04-12 DIAGNOSIS — N93.9 ABNORMAL UTERINE BLEEDING (AUB): Primary | ICD-10-CM

## 2023-04-12 PROCEDURE — 1159F PR MEDICATION LIST DOCUMENTED IN MEDICAL RECORD: ICD-10-PCS | Mod: CPTII,S$GLB,, | Performed by: STUDENT IN AN ORGANIZED HEALTH CARE EDUCATION/TRAINING PROGRAM

## 2023-04-12 PROCEDURE — 99024 PR POST-OP FOLLOW-UP VISIT: ICD-10-PCS | Mod: S$GLB,,, | Performed by: STUDENT IN AN ORGANIZED HEALTH CARE EDUCATION/TRAINING PROGRAM

## 2023-04-12 PROCEDURE — 99999 PR PBB SHADOW E&M-EST. PATIENT-LVL III: ICD-10-PCS | Mod: PBBFAC,,, | Performed by: STUDENT IN AN ORGANIZED HEALTH CARE EDUCATION/TRAINING PROGRAM

## 2023-04-12 PROCEDURE — 3008F PR BODY MASS INDEX (BMI) DOCUMENTED: ICD-10-PCS | Mod: CPTII,S$GLB,, | Performed by: STUDENT IN AN ORGANIZED HEALTH CARE EDUCATION/TRAINING PROGRAM

## 2023-04-12 PROCEDURE — 1159F MED LIST DOCD IN RCRD: CPT | Mod: CPTII,S$GLB,, | Performed by: STUDENT IN AN ORGANIZED HEALTH CARE EDUCATION/TRAINING PROGRAM

## 2023-04-12 PROCEDURE — 3008F BODY MASS INDEX DOCD: CPT | Mod: CPTII,S$GLB,, | Performed by: STUDENT IN AN ORGANIZED HEALTH CARE EDUCATION/TRAINING PROGRAM

## 2023-04-12 PROCEDURE — 99024 POSTOP FOLLOW-UP VISIT: CPT | Mod: S$GLB,,, | Performed by: STUDENT IN AN ORGANIZED HEALTH CARE EDUCATION/TRAINING PROGRAM

## 2023-04-12 PROCEDURE — 99999 PR PBB SHADOW E&M-EST. PATIENT-LVL III: CPT | Mod: PBBFAC,,, | Performed by: STUDENT IN AN ORGANIZED HEALTH CARE EDUCATION/TRAINING PROGRAM

## 2023-04-12 RX ORDER — ETONOGESTREL AND ETHINYL ESTRADIOL VAGINAL RING .015; .12 MG/D; MG/D
1 RING VAGINAL
Qty: 3 EACH | Refills: 3 | Status: SHIPPED | OUTPATIENT
Start: 2023-04-12 | End: 2023-07-11 | Stop reason: CLARIF

## 2023-04-12 NOTE — PROGRESS NOTES
"CC: Postop visit    Petra Medina is a 17 y.o. female  post-op from a dx lap/hscope/myomectomy on 3/21/23. Patient is doing well postoperatively. She is having bleeding. States had bleeding for a few days after surgery, then no bleeding for 2 days, followed by daily bleeding since then. The clots are smaller than they were previously. She is changing her pad 2-3 times per day. No pain. No bowel or bladder complaints. No fever.      She had a virtual visit with heme-onc and has iron infusions scheduled.    Pathology:   Final Pathologic Diagnosis FRAGMENTS OF BENIGN ENDOMETRIUM AND AN UNDERLYING LEIOMYOMA MEASURING 6.5 CM   IN AGGREGATE.          Past Medical History:   Diagnosis Date    ADD (attention deficit disorder with hyperactivity)     Anemia, unspecified      Past Surgical History:   Procedure Laterality Date    DIAGNOSTIC LAPAROSCOPY N/A 3/21/2023    Procedure: LAPAROSCOPY, DIAGNOSTIC;  Surgeon: Katja Dang MD;  Location: [facility];  Service: OB/GYN;  Laterality: N/A;    HYSTEROSCOPIC RESECTION OF MYOMA N/A 3/21/2023    Procedure: MYOMECTOMY, HYSTEROSCOPIC;  Surgeon: Katja Dang MD;  Location: [facility];  Service: OB/GYN;  Laterality: N/A;     Family History   Problem Relation Age of Onset    Heart attacks under age 50 Neg Hx     Early death Neg Hx     Congenital heart disease Neg Hx     Breast cancer Neg Hx     Colon cancer Neg Hx     Ovarian cancer Neg Hx      Social History     Tobacco Use    Smoking status: Never    Smokeless tobacco: Never    Tobacco comments:     VAPING   Substance Use Topics    Alcohol use: No     Comment: SOCIAL    Drug use: No     OB History    Para Term  AB Living   0 0 0 0 0 0   SAB IAB Ectopic Multiple Live Births   0 0 0 0 0       Ht 5' 3" (1.6 m)   Wt 49 kg (108 lb 0.4 oz)   BMI 19.14 kg/m²     ROS:  GENERAL: No fever, chills, fatigability or weight loss.  VULVAR: No pain, no lesions and no itching.  VAGINAL: No relaxation, no itching, " no discharge, no abnormal bleeding and no lesions.  ABDOMEN: No abdominal pain. Denies nausea. Denies vomiting. No diarrhea. No constipation  BREAST: Denies pain. No lumps. No discharge.  URINARY: No incontinence, no nocturia, no frequency and no dysuria.  CARDIOVASCULAR: No chest pain. No shortness of breath. No leg cramps.  NEUROLOGICAL: No headaches. No vision changes.    PE:   GEN:  NAD, A&O x 3  ABDOMEN:  Soft, non-tender, non-distended. Port sites well healed x3  PELVIC: Normal external genitalia without lesions.  Normal hair distribution.  Adequate perineal body, normal urethral meatus.  Vagina moist and well rugated without lesions or discharge.  Cervix well-healed, no active bleeding or clots.  Pelvis without masses or tenderness.      ICD-10-CM ICD-9-CM    1. Abnormal uterine bleeding (AUB)  N93.9 626.9 US Pelvis Comp with Transvag NON-OB (xpd      CBC W/ AUTO DIFFERENTIAL      2. Fibroids  D21.9 215.9 US Pelvis Comp with Transvag NON-OB (xpd      CBC W/ AUTO DIFFERENTIAL            Patient doing well. Will restart nuvaring, Rx sent.  Plan for TVUS to assess remaining fibroid.  Will repeat CBC in about 2 weeks. Keep follow up with heme-onc.  Follow-up in 3 months or sooner, PRN.      Katja Dang MD

## 2023-04-13 ENCOUNTER — INFUSION (OUTPATIENT)
Dept: INFUSION THERAPY | Facility: HOSPITAL | Age: 18
End: 2023-04-13
Attending: STUDENT IN AN ORGANIZED HEALTH CARE EDUCATION/TRAINING PROGRAM
Payer: COMMERCIAL

## 2023-04-13 VITALS
TEMPERATURE: 98 F | DIASTOLIC BLOOD PRESSURE: 75 MMHG | HEART RATE: 84 BPM | OXYGEN SATURATION: 99 % | SYSTOLIC BLOOD PRESSURE: 122 MMHG | RESPIRATION RATE: 20 BRPM

## 2023-04-13 DIAGNOSIS — D50.0 IRON DEFICIENCY ANEMIA DUE TO CHRONIC BLOOD LOSS: Primary | ICD-10-CM

## 2023-04-13 PROCEDURE — 96374 THER/PROPH/DIAG INJ IV PUSH: CPT

## 2023-04-13 PROCEDURE — 63600175 PHARM REV CODE 636 W HCPCS: Mod: JZ,JG | Performed by: STUDENT IN AN ORGANIZED HEALTH CARE EDUCATION/TRAINING PROGRAM

## 2023-04-13 PROCEDURE — 25000003 PHARM REV CODE 250: Performed by: STUDENT IN AN ORGANIZED HEALTH CARE EDUCATION/TRAINING PROGRAM

## 2023-04-13 RX ORDER — HEPARIN 100 UNIT/ML
500 SYRINGE INTRAVENOUS
Status: CANCELLED | OUTPATIENT
Start: 2023-04-13

## 2023-04-13 RX ORDER — SODIUM CHLORIDE 0.9 % (FLUSH) 0.9 %
10 SYRINGE (ML) INJECTION
Status: CANCELLED | OUTPATIENT
Start: 2023-04-13

## 2023-04-13 RX ORDER — SODIUM CHLORIDE 0.9 % (FLUSH) 0.9 %
10 SYRINGE (ML) INJECTION
Status: CANCELLED | OUTPATIENT
Start: 2023-04-20

## 2023-04-13 RX ORDER — HEPARIN 100 UNIT/ML
500 SYRINGE INTRAVENOUS
Status: CANCELLED | OUTPATIENT
Start: 2023-04-20

## 2023-04-13 RX ADMIN — FERUMOXYTOL 510 MG: 510 INJECTION INTRAVENOUS at 03:04

## 2023-04-13 NOTE — PLAN OF CARE
Pt tolerates Ferahene IVP without any complications.      Assist with determining underlying cause.  Promote rest; assist patient with energy-conserving measures to manage fatigue (e.g., cluster care, balance activity with periods of rest).  Implement strategies to prevent falls related to fatigue, weakness and dizziness, such as sitting before standing, seeking assistance with activity and decluttering area.  Promote optimal oral intake to support fluid balance and nutrition; monitor intake and output.  Assess for, and correct, nutritional deficiencies including iron, vitamin B12 or folic acid.  Provide oxygen therapy judiciously to avoid hyperoxemia; adjust to achieve oxygenation goal.  Promote optimal hygiene measures; monitor visitors to minimize infection risk; review and update vaccinations.  Maintain bleeding precautions and monitor for evidence of active bleeding, especially when platelet counts are low; provide protective hemostasis.  Consider adjunctive therapy, such as red blood cell stimulating agent or blood transfusion.

## 2023-04-17 ENCOUNTER — HOSPITAL ENCOUNTER (OUTPATIENT)
Dept: RADIOLOGY | Facility: HOSPITAL | Age: 18
Discharge: HOME | End: 2023-04-17
Attending: STUDENT IN AN ORGANIZED HEALTH CARE EDUCATION/TRAINING PROGRAM
Payer: COMMERCIAL

## 2023-04-17 ENCOUNTER — PATIENT MESSAGE (OUTPATIENT)
Dept: OBSTETRICS AND GYNECOLOGY | Facility: CLINIC | Age: 18
End: 2023-04-17
Payer: COMMERCIAL

## 2023-04-17 DIAGNOSIS — D21.9 FIBROIDS: ICD-10-CM

## 2023-04-17 DIAGNOSIS — N93.9 ABNORMAL UTERINE BLEEDING (AUB): ICD-10-CM

## 2023-04-17 PROCEDURE — 76856 US EXAM PELVIC COMPLETE: CPT | Mod: 26,,, | Performed by: INTERNAL MEDICINE

## 2023-04-17 PROCEDURE — 76856 US EXAM PELVIC COMPLETE: CPT | Mod: TC

## 2023-04-17 PROCEDURE — 76830 TRANSVAGINAL US NON-OB: CPT | Mod: 26,,, | Performed by: INTERNAL MEDICINE

## 2023-04-17 PROCEDURE — 76856 US PELVIS COMP WITH TRANSVAG NON-OB (XPD): ICD-10-PCS | Mod: 26,,, | Performed by: INTERNAL MEDICINE

## 2023-04-17 PROCEDURE — 76830 US PELVIS COMP WITH TRANSVAG NON-OB (XPD): ICD-10-PCS | Mod: 26,,, | Performed by: INTERNAL MEDICINE

## 2023-04-18 ENCOUNTER — TELEPHONE (OUTPATIENT)
Dept: OBSTETRICS AND GYNECOLOGY | Facility: CLINIC | Age: 18
End: 2023-04-18
Payer: COMMERCIAL

## 2023-04-18 NOTE — TELEPHONE ENCOUNTER
Called to discuss US results. 4 cm intramural fibroid remains. She has not started nuvaring yet. Bleeding still consistent, picked up a little yesterday but clots are smaller. Recommend restarting nuvaring. Last iron infusion is scheduled for Friday. Will repeat CBC in the next week to ensure no significant drop. Plan for follow up in 3 months. All questions answered.       Katja Dang MD

## 2023-04-19 ENCOUNTER — TELEPHONE (OUTPATIENT)
Dept: OBSTETRICS AND GYNECOLOGY | Facility: CLINIC | Age: 18
End: 2023-04-19
Payer: COMMERCIAL

## 2023-04-21 ENCOUNTER — TELEPHONE (OUTPATIENT)
Dept: OBSTETRICS AND GYNECOLOGY | Facility: CLINIC | Age: 18
End: 2023-04-21
Payer: COMMERCIAL

## 2023-04-21 ENCOUNTER — INFUSION (OUTPATIENT)
Dept: INFUSION THERAPY | Facility: HOSPITAL | Age: 18
End: 2023-04-21
Attending: STUDENT IN AN ORGANIZED HEALTH CARE EDUCATION/TRAINING PROGRAM
Payer: COMMERCIAL

## 2023-04-21 ENCOUNTER — LAB VISIT (OUTPATIENT)
Dept: LAB | Facility: HOSPITAL | Age: 18
End: 2023-04-21
Attending: STUDENT IN AN ORGANIZED HEALTH CARE EDUCATION/TRAINING PROGRAM
Payer: COMMERCIAL

## 2023-04-21 VITALS
DIASTOLIC BLOOD PRESSURE: 67 MMHG | HEART RATE: 115 BPM | OXYGEN SATURATION: 100 % | SYSTOLIC BLOOD PRESSURE: 112 MMHG | TEMPERATURE: 99 F | RESPIRATION RATE: 18 BRPM

## 2023-04-21 DIAGNOSIS — D21.9 FIBROIDS: ICD-10-CM

## 2023-04-21 DIAGNOSIS — N93.9 ABNORMAL UTERINE BLEEDING (AUB): ICD-10-CM

## 2023-04-21 DIAGNOSIS — D50.0 IRON DEFICIENCY ANEMIA DUE TO CHRONIC BLOOD LOSS: Primary | ICD-10-CM

## 2023-04-21 LAB
BASOPHILS # BLD AUTO: 0.05 K/UL (ref 0–0.2)
BASOPHILS NFR BLD: 0.8 % (ref 0–1.9)
DIFFERENTIAL METHOD: ABNORMAL
EOSINOPHIL # BLD AUTO: 0.1 K/UL (ref 0–0.5)
EOSINOPHIL NFR BLD: 1.5 % (ref 0–8)
ERYTHROCYTE [DISTWIDTH] IN BLOOD BY AUTOMATED COUNT: 28.2 % (ref 11.5–14.5)
HCT VFR BLD AUTO: 26.8 % (ref 37–48.5)
HGB BLD-MCNC: 7.9 G/DL (ref 12–16)
IMM GRANULOCYTES # BLD AUTO: 0.02 K/UL (ref 0–0.04)
IMM GRANULOCYTES NFR BLD AUTO: 0.3 % (ref 0–0.5)
LYMPHOCYTES # BLD AUTO: 1.7 K/UL (ref 1–4.8)
LYMPHOCYTES NFR BLD: 25.1 % (ref 18–48)
MCH RBC QN AUTO: 23.7 PG (ref 27–31)
MCHC RBC AUTO-ENTMCNC: 29.5 G/DL (ref 32–36)
MCV RBC AUTO: 80 FL (ref 82–98)
MONOCYTES # BLD AUTO: 0.5 K/UL (ref 0.3–1)
MONOCYTES NFR BLD: 8 % (ref 4–15)
NEUTROPHILS # BLD AUTO: 4.3 K/UL (ref 1.8–7.7)
NEUTROPHILS NFR BLD: 64.3 % (ref 38–73)
NRBC BLD-RTO: 0 /100 WBC
PLATELET # BLD AUTO: 234 K/UL (ref 150–450)
PMV BLD AUTO: ABNORMAL FL (ref 9.2–12.9)
RBC # BLD AUTO: 3.34 M/UL (ref 4–5.4)
WBC # BLD AUTO: 6.61 K/UL (ref 3.9–12.7)

## 2023-04-21 PROCEDURE — 85025 COMPLETE CBC W/AUTO DIFF WBC: CPT | Performed by: STUDENT IN AN ORGANIZED HEALTH CARE EDUCATION/TRAINING PROGRAM

## 2023-04-21 PROCEDURE — 25000003 PHARM REV CODE 250: Performed by: STUDENT IN AN ORGANIZED HEALTH CARE EDUCATION/TRAINING PROGRAM

## 2023-04-21 PROCEDURE — 36415 COLL VENOUS BLD VENIPUNCTURE: CPT | Performed by: STUDENT IN AN ORGANIZED HEALTH CARE EDUCATION/TRAINING PROGRAM

## 2023-04-21 PROCEDURE — 63600175 PHARM REV CODE 636 W HCPCS: Mod: JZ,JG | Performed by: STUDENT IN AN ORGANIZED HEALTH CARE EDUCATION/TRAINING PROGRAM

## 2023-04-21 PROCEDURE — 96374 THER/PROPH/DIAG INJ IV PUSH: CPT

## 2023-04-21 RX ORDER — HEPARIN 100 UNIT/ML
500 SYRINGE INTRAVENOUS
Status: CANCELLED | OUTPATIENT
Start: 2023-04-21

## 2023-04-21 RX ORDER — SODIUM CHLORIDE 0.9 % (FLUSH) 0.9 %
10 SYRINGE (ML) INJECTION
Status: CANCELLED | OUTPATIENT
Start: 2023-04-21

## 2023-04-21 RX ADMIN — FERUMOXYTOL 510 MG: 510 INJECTION INTRAVENOUS at 02:04

## 2023-04-21 NOTE — TELEPHONE ENCOUNTER
----- Message from Rose Ellis sent at 4/21/2023  2:31 PM CDT -----  Regarding: Return CAll  Type: Patient Returning Call            Who Called: YESENIA PIKE [[id number]]            Who Left Message for Patient: Cyndie             Does the patient know what this is regarding? No            Best Call Back Number: [phone]

## 2023-04-21 NOTE — TELEPHONE ENCOUNTER
Spoke with pt. Pt stated she received a call from the office @216pm. Assured pt that it wasn't from OBGYN . Pt verbalized understanding. No further questions.

## 2023-04-21 NOTE — PLAN OF CARE
Pt received her second ordered dose of Feraheme. No issues noted after receiving her first initial dose last week. VSS. Still continues with fatigue. Feraheme infused. Pt tolerated well without issues. Has her next appointments through [facility]. Discharged from unit in [employer] [employer].

## 2023-05-14 ENCOUNTER — PATIENT MESSAGE (OUTPATIENT)
Dept: OBSTETRICS AND GYNECOLOGY | Facility: CLINIC | Age: 18
End: 2023-05-14
Payer: COMMERCIAL

## 2023-05-15 DIAGNOSIS — N93.9 ABNORMAL UTERINE BLEEDING (AUB): Primary | ICD-10-CM

## 2023-05-15 RX ORDER — IBUPROFEN 600 MG/1
600 TABLET ORAL EVERY 6 HOURS PRN
Qty: 30 TABLET | Refills: 3 | Status: ON HOLD | OUTPATIENT
Start: 2023-05-15 | End: 2023-07-14 | Stop reason: HOSPADM

## 2023-05-16 ENCOUNTER — LAB VISIT (OUTPATIENT)
Dept: LAB | Facility: HOSPITAL | Age: 18
End: 2023-05-16
Attending: STUDENT IN AN ORGANIZED HEALTH CARE EDUCATION/TRAINING PROGRAM
Payer: COMMERCIAL

## 2023-05-16 DIAGNOSIS — N93.9 ABNORMAL UTERINE BLEEDING (AUB): ICD-10-CM

## 2023-05-16 LAB
BASOPHILS # BLD AUTO: 0.05 K/UL (ref 0–0.2)
BASOPHILS NFR BLD: 1.2 % (ref 0–1.9)
DIFFERENTIAL METHOD: ABNORMAL
EOSINOPHIL # BLD AUTO: 0.2 K/UL (ref 0–0.5)
EOSINOPHIL NFR BLD: 4.7 % (ref 0–8)
ERYTHROCYTE [DISTWIDTH] IN BLOOD BY AUTOMATED COUNT: 18.2 % (ref 11.5–14.5)
HCT VFR BLD AUTO: 30.3 % (ref 37–48.5)
HGB BLD-MCNC: 9 G/DL (ref 12–16)
IMM GRANULOCYTES # BLD AUTO: 0.01 K/UL (ref 0–0.04)
IMM GRANULOCYTES NFR BLD AUTO: 0.2 % (ref 0–0.5)
LYMPHOCYTES # BLD AUTO: 1.5 K/UL (ref 1–4.8)
LYMPHOCYTES NFR BLD: 37.7 % (ref 18–48)
MCH RBC QN AUTO: 26.4 PG (ref 27–31)
MCHC RBC AUTO-ENTMCNC: 29.7 G/DL (ref 32–36)
MCV RBC AUTO: 89 FL (ref 82–98)
MONOCYTES # BLD AUTO: 0.4 K/UL (ref 0.3–1)
MONOCYTES NFR BLD: 8.7 % (ref 4–15)
NEUTROPHILS # BLD AUTO: 1.9 K/UL (ref 1.8–7.7)
NEUTROPHILS NFR BLD: 47.5 % (ref 38–73)
NRBC BLD-RTO: 0 /100 WBC
PLATELET # BLD AUTO: 159 K/UL (ref 150–450)
PMV BLD AUTO: ABNORMAL FL (ref 9.2–12.9)
RBC # BLD AUTO: 3.41 M/UL (ref 4–5.4)
WBC # BLD AUTO: 4.01 K/UL (ref 3.9–12.7)

## 2023-05-16 PROCEDURE — 36415 COLL VENOUS BLD VENIPUNCTURE: CPT | Mod: PO | Performed by: STUDENT IN AN ORGANIZED HEALTH CARE EDUCATION/TRAINING PROGRAM

## 2023-05-16 PROCEDURE — 85025 COMPLETE CBC W/AUTO DIFF WBC: CPT | Performed by: STUDENT IN AN ORGANIZED HEALTH CARE EDUCATION/TRAINING PROGRAM

## 2023-06-07 ENCOUNTER — PATIENT MESSAGE (OUTPATIENT)
Dept: OBSTETRICS AND GYNECOLOGY | Facility: CLINIC | Age: 18
End: 2023-06-07
Payer: COMMERCIAL

## 2023-06-07 DIAGNOSIS — N93.9 ABNORMAL UTERINE BLEEDING (AUB): Primary | ICD-10-CM

## 2023-06-13 ENCOUNTER — TELEPHONE (OUTPATIENT)
Dept: OBSTETRICS AND GYNECOLOGY | Facility: CLINIC | Age: 18
End: 2023-06-13
Payer: COMMERCIAL

## 2023-06-13 NOTE — TELEPHONE ENCOUNTER
----- Message from Gisele Noriega sent at 6/13/2023 10:06 AM CDT -----  Type: Call Back      Who called: Mecca Ritchie    What is the request in detail: Patient is requesting a call back. She would like to know if she can be seen sooner for increased bleeding, clotting, and cramping.   Please advise.     Can the clinic reply by [facility]? Yes      Would the patient rather a call back or a response via [facility]? Call back       Best call back number: [phone]      Additional Information:

## 2023-06-14 ENCOUNTER — PATIENT MESSAGE (OUTPATIENT)
Dept: OBSTETRICS AND GYNECOLOGY | Facility: CLINIC | Age: 18
End: 2023-06-14
Payer: COMMERCIAL

## 2023-06-14 ENCOUNTER — TELEPHONE (OUTPATIENT)
Dept: OBSTETRICS AND GYNECOLOGY | Facility: CLINIC | Age: 18
End: 2023-06-14
Payer: COMMERCIAL

## 2023-06-14 NOTE — TELEPHONE ENCOUNTER
----- Message from Sherlyn Mayorga sent at 6/14/2023  8:05 AM CDT -----  Regarding: please call mom back    Type:  Patient Returning Call    Who Called:Mom/ Mecca     Who Left Message for Patient:Cyndie    Does the patient know what this is regarding?    Best Call Back Number: Mom works nights. She said she will be up until 9am if you could call her before then. She is asking for a c/b to get a sooner appt.  Message in portal if no answer     Additional Information: [phone]

## 2023-06-14 NOTE — TELEPHONE ENCOUNTER
Spoke with pt mother and gave bleeding precautions. Advised to go to the ED if any of the symptoms apply. Pt mother states she doesn't think the ED is necessary.  Notified pt mother that there wasn't any sooner appointment due to the Dr. Dang being out of office. Pt mother verbalized understanding.

## 2023-06-21 ENCOUNTER — TELEPHONE (OUTPATIENT)
Dept: OBSTETRICS AND GYNECOLOGY | Facility: CLINIC | Age: 18
End: 2023-06-21
Payer: COMMERCIAL

## 2023-06-21 ENCOUNTER — TELEPHONE (OUTPATIENT)
Dept: OBSTETRICS AND GYNECOLOGY | Facility: HOSPITAL | Age: 18
End: 2023-06-21
Payer: COMMERCIAL

## 2023-06-21 ENCOUNTER — LAB VISIT (OUTPATIENT)
Dept: LAB | Facility: HOSPITAL | Age: 18
End: 2023-06-21
Attending: STUDENT IN AN ORGANIZED HEALTH CARE EDUCATION/TRAINING PROGRAM
Payer: COMMERCIAL

## 2023-06-21 ENCOUNTER — PATIENT MESSAGE (OUTPATIENT)
Dept: OBSTETRICS AND GYNECOLOGY | Facility: CLINIC | Age: 18
End: 2023-06-21
Payer: COMMERCIAL

## 2023-06-21 DIAGNOSIS — N93.9 ABNORMAL UTERINE BLEEDING (AUB): ICD-10-CM

## 2023-06-21 LAB
BASOPHILS # BLD AUTO: 0.04 K/UL (ref 0–0.2)
BASOPHILS NFR BLD: 1 % (ref 0–1.9)
DIFFERENTIAL METHOD: ABNORMAL
EOSINOPHIL # BLD AUTO: 0.1 K/UL (ref 0–0.5)
EOSINOPHIL NFR BLD: 2 % (ref 0–8)
ERYTHROCYTE [DISTWIDTH] IN BLOOD BY AUTOMATED COUNT: 17.1 % (ref 11.5–14.5)
HCT VFR BLD AUTO: 22.4 % (ref 37–48.5)
HGB BLD-MCNC: 6.7 G/DL (ref 12–16)
IMM GRANULOCYTES # BLD AUTO: 0.01 K/UL (ref 0–0.04)
IMM GRANULOCYTES NFR BLD AUTO: 0.3 % (ref 0–0.5)
LYMPHOCYTES # BLD AUTO: 1 K/UL (ref 1–4.8)
LYMPHOCYTES NFR BLD: 24.3 % (ref 18–48)
MCH RBC QN AUTO: 23.3 PG (ref 27–31)
MCHC RBC AUTO-ENTMCNC: 29.9 G/DL (ref 32–36)
MCV RBC AUTO: 78 FL (ref 82–98)
MONOCYTES # BLD AUTO: 0.4 K/UL (ref 0.3–1)
MONOCYTES NFR BLD: 9 % (ref 4–15)
NEUTROPHILS # BLD AUTO: 2.5 K/UL (ref 1.8–7.7)
NEUTROPHILS NFR BLD: 63.4 % (ref 38–73)
NRBC BLD-RTO: 0 /100 WBC
PLATELET # BLD AUTO: 299 K/UL (ref 150–450)
PMV BLD AUTO: 12.7 FL (ref 9.2–12.9)
RBC # BLD AUTO: 2.87 M/UL (ref 4–5.4)
WBC # BLD AUTO: 3.99 K/UL (ref 3.9–12.7)

## 2023-06-21 PROCEDURE — 85025 COMPLETE CBC W/AUTO DIFF WBC: CPT | Performed by: STUDENT IN AN ORGANIZED HEALTH CARE EDUCATION/TRAINING PROGRAM

## 2023-06-21 PROCEDURE — 36415 COLL VENOUS BLD VENIPUNCTURE: CPT | Performed by: STUDENT IN AN ORGANIZED HEALTH CARE EDUCATION/TRAINING PROGRAM

## 2023-06-21 NOTE — TELEPHONE ENCOUNTER
Spoke with pt mother. Notified her that Dr. Dang is in clinic at the moment and will get back with. Pt mother states the recommendation Dr. Dang recommended is not ER appropriate and doesn't think she need to take her daughter to the ER for blood. Pt mother states she just rather Dr. Dang call her back and hung up the phone.

## 2023-06-21 NOTE — TELEPHONE ENCOUNTER
Called to discuss H/H. She is continuing to have bleeding. Recommend presenting to ED for evaluation and transfusion. She will call her mom and get back to us.      Katja Dang MD

## 2023-06-21 NOTE — TELEPHONE ENCOUNTER
Called to discuss next steps. Petra asked that I call her mom to make plans. Spoke with Petra's mom, who states did not take Petra to the ED because this is a chronic issue and she did not feel that the ED was appropriate. She states that the bleeding is currently manageable and Petra is not having any symptoms of anemia. She was able to go to work today. She would prefer to arrange a transfusion as an outpatient. She has had episodes of passing large clots (twice since she was last seen) but this is not her every day. She has an appointment with me Friday, would be more comfortable seeing me tomorrow. Discussed will likely need to start either Oriahnn or Myfymbree. Recommend going to ED if bleeding increases or if s/s of anemia.        Katja Dang MD

## 2023-06-22 ENCOUNTER — OFFICE VISIT (OUTPATIENT)
Dept: OBSTETRICS AND GYNECOLOGY | Facility: CLINIC | Age: 18
End: 2023-06-22
Payer: COMMERCIAL

## 2023-06-22 VITALS
DIASTOLIC BLOOD PRESSURE: 66 MMHG | BODY MASS INDEX: 19.18 KG/M2 | WEIGHT: 108.25 LBS | SYSTOLIC BLOOD PRESSURE: 110 MMHG | HEIGHT: 63 IN

## 2023-06-22 DIAGNOSIS — D64.9 ACUTE ON CHRONIC ANEMIA: Primary | ICD-10-CM

## 2023-06-22 DIAGNOSIS — D21.9 FIBROIDS: ICD-10-CM

## 2023-06-22 DIAGNOSIS — N93.9 ABNORMAL UTERINE BLEEDING (AUB): Primary | ICD-10-CM

## 2023-06-22 DIAGNOSIS — D64.9 ACUTE ON CHRONIC ANEMIA: ICD-10-CM

## 2023-06-22 DIAGNOSIS — Z30.09 ENCOUNTER FOR EVALUATION REGARDING CONTRACEPTION OPTIONS: ICD-10-CM

## 2023-06-22 PROCEDURE — 99999 PR PBB SHADOW E&M-EST. PATIENT-LVL III: ICD-10-PCS | Mod: PBBFAC,,, | Performed by: STUDENT IN AN ORGANIZED HEALTH CARE EDUCATION/TRAINING PROGRAM

## 2023-06-22 PROCEDURE — 1159F MED LIST DOCD IN RCRD: CPT | Mod: CPTII,S$GLB,, | Performed by: STUDENT IN AN ORGANIZED HEALTH CARE EDUCATION/TRAINING PROGRAM

## 2023-06-22 PROCEDURE — 99214 OFFICE O/P EST MOD 30 MIN: CPT | Mod: S$GLB,,, | Performed by: STUDENT IN AN ORGANIZED HEALTH CARE EDUCATION/TRAINING PROGRAM

## 2023-06-22 PROCEDURE — 3074F SYST BP LT 130 MM HG: CPT | Mod: CPTII,S$GLB,, | Performed by: STUDENT IN AN ORGANIZED HEALTH CARE EDUCATION/TRAINING PROGRAM

## 2023-06-22 PROCEDURE — 3078F PR MOST RECENT DIASTOLIC BLOOD PRESSURE < 80 MM HG: ICD-10-PCS | Mod: CPTII,S$GLB,, | Performed by: STUDENT IN AN ORGANIZED HEALTH CARE EDUCATION/TRAINING PROGRAM

## 2023-06-22 PROCEDURE — 1159F PR MEDICATION LIST DOCUMENTED IN MEDICAL RECORD: ICD-10-PCS | Mod: CPTII,S$GLB,, | Performed by: STUDENT IN AN ORGANIZED HEALTH CARE EDUCATION/TRAINING PROGRAM

## 2023-06-22 PROCEDURE — 99214 PR OFFICE/OUTPT VISIT, EST, LEVL IV, 30-39 MIN: ICD-10-PCS | Mod: S$GLB,,, | Performed by: STUDENT IN AN ORGANIZED HEALTH CARE EDUCATION/TRAINING PROGRAM

## 2023-06-22 PROCEDURE — 3074F PR MOST RECENT SYSTOLIC BLOOD PRESSURE < 130 MM HG: ICD-10-PCS | Mod: CPTII,S$GLB,, | Performed by: STUDENT IN AN ORGANIZED HEALTH CARE EDUCATION/TRAINING PROGRAM

## 2023-06-22 PROCEDURE — 99999 PR PBB SHADOW E&M-EST. PATIENT-LVL III: CPT | Mod: PBBFAC,,, | Performed by: STUDENT IN AN ORGANIZED HEALTH CARE EDUCATION/TRAINING PROGRAM

## 2023-06-22 PROCEDURE — 3008F BODY MASS INDEX DOCD: CPT | Mod: CPTII,S$GLB,, | Performed by: STUDENT IN AN ORGANIZED HEALTH CARE EDUCATION/TRAINING PROGRAM

## 2023-06-22 PROCEDURE — 3008F PR BODY MASS INDEX (BMI) DOCUMENTED: ICD-10-PCS | Mod: CPTII,S$GLB,, | Performed by: STUDENT IN AN ORGANIZED HEALTH CARE EDUCATION/TRAINING PROGRAM

## 2023-06-22 PROCEDURE — 3078F DIAST BP <80 MM HG: CPT | Mod: CPTII,S$GLB,, | Performed by: STUDENT IN AN ORGANIZED HEALTH CARE EDUCATION/TRAINING PROGRAM

## 2023-06-22 RX ORDER — RELUGOLIX, ESTRADIOL HEMIHYDRATE, AND NORETHINDRONE ACETATE 40; 1; .5 MG/1; MG/1; MG/1
1 TABLET, FILM COATED ORAL DAILY
Qty: 84 TABLET | Refills: 3 | Status: SHIPPED | OUTPATIENT
Start: 2023-06-22 | End: 2023-08-29 | Stop reason: SDUPTHER

## 2023-06-22 RX ORDER — LACTIC ACID, L-, CITRIC ACID MONOHYDRATE, AND POTASSIUM BITARTRATE 90; 50; 20 MG/5G; MG/5G; MG/5G
1 GEL VAGINAL ONCE AS NEEDED
Qty: 120 G | Refills: 0 | Status: SHIPPED | OUTPATIENT
Start: 2023-06-22 | End: 2023-07-08

## 2023-06-23 ENCOUNTER — TELEPHONE (OUTPATIENT)
Dept: PHARMACY | Facility: CLINIC | Age: 18
End: 2023-06-23
Payer: COMMERCIAL

## 2023-06-23 RX ORDER — LACTIC ACID, L-, CITRIC ACID MONOHYDRATE, AND POTASSIUM BITARTRATE 90; 50; 20 MG/5G; MG/5G; MG/5G
1 GEL VAGINAL ONCE AS NEEDED
Qty: 120 G | Refills: 0 | Status: SHIPPED | OUTPATIENT
Start: 2023-06-23 | End: 2023-06-23

## 2023-06-23 NOTE — PROGRESS NOTES
History & Physical  Gynecology      SUBJECTIVE:     Chief Complaint: heavy bleeding       History of Present Illness:  Petra Medina is a 17 y.o. F who presents with her mom for follow up of AUB due to fibroids. She underwent hysteroscopic myomectomy in March for a large fibroid. 6 cm of submucosal fibroid was resected but post-op US revealed an additional 4.5 cm of intramural fibroid remained. After surgery, she was restarted on her nuvaring, which she was previously using for contraception and received iron infusions with heme-onc. She had been doing fairly well for a while but has had 3 episodes of heavy bleeding since her last visit. She describes these as flares, in which she experiences abdominal pain and then passes large clots. In between these bleeding episodes her bleeding is very light (1 pad per day). She reached out asking for labs because she felt like her H/H had dropped. This was drawn yesterday and resulted 6.7/22.4. I recommended that she present to the ED but she didn't end up going. Her bleeding was light yesterday and she was not having symptoms of anemia. She states she doesn't feel as bad as she did pre-op when her hemoglobin was 7. She was able to go to work yesterday. Today, her bleeding is light but she knows that the bleeding is not under good control with the nuvaring.      Review of patient's allergies indicates:   Allergen Reactions    Cherries      unknown    Lavender      unknown       Past Medical History:   Diagnosis Date    ADD (attention deficit disorder with hyperactivity)     Anemia, unspecified      Past Surgical History:   Procedure Laterality Date    DIAGNOSTIC LAPAROSCOPY N/A 3/21/2023    Procedure: LAPAROSCOPY, DIAGNOSTIC;  Surgeon: Katja Dang MD;  Location: [facility];  Service: OB/GYN;  Laterality: N/A;    HYSTEROSCOPIC RESECTION OF MYOMA N/A 3/21/2023    Procedure: MYOMECTOMY, HYSTEROSCOPIC;  Surgeon: Katja Dang MD;  Location: [facility];  Service:  OB/GYN;  Laterality: N/A;     OB History          0    Para   0    Term   0       0    AB   0    Living   0         SAB   0    IAB   0    Ectopic   0    Multiple   0    Live Births   0               Family History   Problem Relation Age of Onset    Heart attacks under age 50 Neg Hx     Early death Neg Hx     Congenital heart disease Neg Hx     Breast cancer Neg Hx     Colon cancer Neg Hx     Ovarian cancer Neg Hx      Social History     Tobacco Use    Smoking status: Never    Smokeless tobacco: Never    Tobacco comments:     VAPING   Substance Use Topics    Alcohol use: No     Comment: SOCIAL    Drug use: No       Current Outpatient Medications   Medication Sig    acetaminophen (TYLENOL) 650 MG TbSR Take 1 tablet (650 mg total) by mouth every 6 to 8 hours as needed (Pain).    etonogestreL-ethinyl estradioL (NUVARING) 0.12-0.015 mg/24 hr vaginal ring Place 1 each vaginally every 28 days.    ibuprofen (ADVIL,MOTRIN) 600 MG tablet Take 1 tablet (600 mg total) by mouth every 6 (six) hours as needed for Pain.    prenatal vit-iron fum-folic ac 27 mg iron- 0.8 mg Tab Take 1 tablet by mouth.    SENNA 8.6 mg tablet Take 1 tablet by mouth once daily.    lactic acid-citric-potassium (PHEXXI) 1.8-1-0.4 % Gel Place 1 application vaginally once as needed (Within 1 hour of intercourse).    relugolix-estradiol-norethindr (MYFEMBREE) 40-1-0.5 mg Tab Take 1 tablet by mouth once daily.     No current facility-administered medications for this visit.         Review of Systems:  Review of Systems   Constitutional:  Negative for fever.   Respiratory:  Negative for shortness of breath.    Cardiovascular:  Negative for chest pain.   Gastrointestinal:  Negative for abdominal pain, nausea and vomiting.   Genitourinary:  Positive for menorrhagia and menstrual problem. Negative for pelvic pain.   Neurological:  Negative for syncope and headaches.      OBJECTIVE:     Physical Exam:  Physical Exam  Vitals reviewed.    Constitutional:       General: She is not in acute distress.     Appearance: She is well-developed. She is not diaphoretic.   HENT:      Head: Normocephalic and atraumatic.      Nose: Nose normal.   Eyes:      Extraocular Movements: Extraocular movements intact.      Conjunctiva/sclera: Conjunctivae normal.   Cardiovascular:      Rate and Rhythm: Normal rate.   Pulmonary:      Effort: Pulmonary effort is normal. No respiratory distress.   Abdominal:      Palpations: Abdomen is soft.      Tenderness: There is no abdominal tenderness.   Genitourinary:     Comments: Patient declined pelvic exam, states not really having bleeding today.  Musculoskeletal:         General: No tenderness. Normal range of motion.      Cervical back: Normal range of motion.   Skin:     General: Skin is warm and dry.   Neurological:      Mental Status: She is alert and oriented to person, place, and time.   Psychiatric:         Behavior: Behavior normal.         Thought Content: Thought content normal.         Judgment: Judgment normal.         ASSESSMENT:       ICD-10-CM ICD-9-CM    1. Abnormal uterine bleeding (AUB)  N93.9 626.9       2. Fibroids  D21.9 215.9       3. Acute on chronic anemia  D64.9 285.9              Plan:      -- Recommend discontinuing nuvaring and starting Myfembree. R/B/A discussed and informational pamphlet provided. Discussed use of non-hormonal contraceptive while taking myfembree. Information provided for Phexxi.  -- Orders placed for outpatient transfusion. Will try to arrange as soon as approved. Strict precautions given for s/s of anemia.  -- Discussed that she may need additional procedures/surgery if myfembree unable to control bleeding. Recommend that she see Dr. Valdovinos to discuss further options. Message sent to Dr. Valdovinos's clinic.  -- Follow up in 8 weeks.         Katja Dang MD

## 2023-06-27 ENCOUNTER — LAB VISIT (OUTPATIENT)
Dept: LAB | Facility: HOSPITAL | Age: 18
End: 2023-06-27
Attending: STUDENT IN AN ORGANIZED HEALTH CARE EDUCATION/TRAINING PROGRAM
Payer: COMMERCIAL

## 2023-06-27 ENCOUNTER — TELEPHONE (OUTPATIENT)
Dept: OBSTETRICS AND GYNECOLOGY | Facility: CLINIC | Age: 18
End: 2023-06-27
Payer: COMMERCIAL

## 2023-06-27 ENCOUNTER — TELEPHONE (OUTPATIENT)
Dept: PHARMACY | Facility: CLINIC | Age: 18
End: 2023-06-27
Payer: COMMERCIAL

## 2023-06-27 DIAGNOSIS — D50.0 IRON DEFICIENCY ANEMIA DUE TO CHRONIC BLOOD LOSS: ICD-10-CM

## 2023-06-27 DIAGNOSIS — D64.9 ACUTE ON CHRONIC ANEMIA: ICD-10-CM

## 2023-06-27 LAB
ABO + RH BLD: NORMAL
BASOPHILS # BLD AUTO: 0.04 K/UL (ref 0–0.2)
BASOPHILS NFR BLD: 0.9 % (ref 0–1.9)
BLD GP AB SCN CELLS X3 SERPL QL: NORMAL
DIFFERENTIAL METHOD: ABNORMAL
EOSINOPHIL # BLD AUTO: 0.1 K/UL (ref 0–0.5)
EOSINOPHIL NFR BLD: 1.9 % (ref 0–8)
ERYTHROCYTE [DISTWIDTH] IN BLOOD BY AUTOMATED COUNT: 19.5 % (ref 11.5–14.5)
HCT VFR BLD AUTO: 22.3 % (ref 37–48.5)
HGB BLD-MCNC: 6.4 G/DL (ref 12–16)
IMM GRANULOCYTES # BLD AUTO: 0 K/UL (ref 0–0.04)
IMM GRANULOCYTES NFR BLD AUTO: 0 % (ref 0–0.5)
LYMPHOCYTES # BLD AUTO: 1.6 K/UL (ref 1–4.8)
LYMPHOCYTES NFR BLD: 36.6 % (ref 18–48)
MCH RBC QN AUTO: 21.5 PG (ref 27–31)
MCHC RBC AUTO-ENTMCNC: 28.7 G/DL (ref 32–36)
MCV RBC AUTO: 75 FL (ref 82–98)
MONOCYTES # BLD AUTO: 0.4 K/UL (ref 0.3–1)
MONOCYTES NFR BLD: 8.6 % (ref 4–15)
NEUTROPHILS # BLD AUTO: 2.3 K/UL (ref 1.8–7.7)
NEUTROPHILS NFR BLD: 52 % (ref 38–73)
NRBC BLD-RTO: 0 /100 WBC
PLATELET # BLD AUTO: 260 K/UL (ref 150–450)
PMV BLD AUTO: ABNORMAL FL (ref 9.2–12.9)
RBC # BLD AUTO: 2.98 M/UL (ref 4–5.4)
SPECIMEN OUTDATE: NORMAL
WBC # BLD AUTO: 4.32 K/UL (ref 3.9–12.7)

## 2023-06-27 PROCEDURE — 36415 COLL VENOUS BLD VENIPUNCTURE: CPT | Performed by: STUDENT IN AN ORGANIZED HEALTH CARE EDUCATION/TRAINING PROGRAM

## 2023-06-27 PROCEDURE — 86900 BLOOD TYPING SEROLOGIC ABO: CPT | Performed by: STUDENT IN AN ORGANIZED HEALTH CARE EDUCATION/TRAINING PROGRAM

## 2023-06-27 PROCEDURE — 86920 COMPATIBILITY TEST SPIN: CPT | Performed by: STUDENT IN AN ORGANIZED HEALTH CARE EDUCATION/TRAINING PROGRAM

## 2023-06-27 PROCEDURE — 85025 COMPLETE CBC W/AUTO DIFF WBC: CPT | Performed by: STUDENT IN AN ORGANIZED HEALTH CARE EDUCATION/TRAINING PROGRAM

## 2023-06-27 NOTE — TELEPHONE ENCOUNTER
----- Message from Cyndie Munoz MA sent at 6/26/2023  5:01 PM CDT -----  Regarding: FW: Concern And Questions    ----- Message -----  From: Armaan Hayward  Sent: 6/26/2023   8:22 AM CDT  To: [facility] Staff  Subject: Concern And Questions                            Name of Who is Calling:  Patient          What is the request in detail:  Patient stated Dr. Dang asked her to give her a call about her blood transfusion if she did not see the appointment on her [facility] and also patient would like to know if her insurance will pay for this procedure.            [facility] Yes            What Number to Call Back if not in [facility]: [phone]

## 2023-06-28 ENCOUNTER — INFUSION (OUTPATIENT)
Dept: INFUSION THERAPY | Facility: HOSPITAL | Age: 18
End: 2023-06-28
Attending: STUDENT IN AN ORGANIZED HEALTH CARE EDUCATION/TRAINING PROGRAM
Payer: COMMERCIAL

## 2023-06-28 VITALS
TEMPERATURE: 99 F | RESPIRATION RATE: 16 BRPM | OXYGEN SATURATION: 100 % | DIASTOLIC BLOOD PRESSURE: 53 MMHG | HEART RATE: 81 BPM | SYSTOLIC BLOOD PRESSURE: 97 MMHG

## 2023-06-28 DIAGNOSIS — D64.9 ACUTE ON CHRONIC ANEMIA: ICD-10-CM

## 2023-06-28 LAB
BLD PROD TYP BPU: NORMAL
BLOOD UNIT EXPIRATION DATE: NORMAL
BLOOD UNIT TYPE CODE: 5100
BLOOD UNIT TYPE: NORMAL
CODING SYSTEM: NORMAL
CROSSMATCH INTERPRETATION: NORMAL
DISPENSE STATUS: NORMAL
NUM UNITS TRANS PACKED RBC: NORMAL

## 2023-06-28 PROCEDURE — 36430 TRANSFUSION BLD/BLD COMPNT: CPT

## 2023-06-28 PROCEDURE — P9016 RBC LEUKOCYTES REDUCED: HCPCS | Performed by: STUDENT IN AN ORGANIZED HEALTH CARE EDUCATION/TRAINING PROGRAM

## 2023-06-28 PROCEDURE — 25000003 PHARM REV CODE 250: Performed by: STUDENT IN AN ORGANIZED HEALTH CARE EDUCATION/TRAINING PROGRAM

## 2023-06-28 RX ORDER — ACETAMINOPHEN 325 MG/1
650 TABLET ORAL
Status: COMPLETED | OUTPATIENT
Start: 2023-06-28 | End: 2023-06-28

## 2023-06-28 RX ORDER — DIPHENHYDRAMINE HCL 25 MG
25 CAPSULE ORAL
Status: COMPLETED | OUTPATIENT
Start: 2023-06-28 | End: 2023-06-28

## 2023-06-28 RX ORDER — HYDROCODONE BITARTRATE AND ACETAMINOPHEN 500; 5 MG/1; MG/1
TABLET ORAL
Status: DISCONTINUED | OUTPATIENT
Start: 2023-06-28 | End: 2023-06-28 | Stop reason: HOSPADM

## 2023-06-28 RX ADMIN — ACETAMINOPHEN 650 MG: 325 TABLET ORAL at 12:06

## 2023-06-28 RX ADMIN — DIPHENHYDRAMINE HYDROCHLORIDE 25 MG: 25 CAPSULE ORAL at 12:06

## 2023-06-28 NOTE — PLAN OF CARE
Pt arrived to unit for 1u of PRBC. Most recent H&H reviewed and is 6.4 & 22.3. VSS. Blood band noted to pt's right wrist. Pt endorses generalized fatigue and dizziness upon standing. Pre-meds of Tylenol and Benadryl given. Blood transfused without issues. Pt discharged from unit in NAD.

## 2023-06-30 ENCOUNTER — TELEPHONE (OUTPATIENT)
Dept: OBSTETRICS AND GYNECOLOGY | Facility: CLINIC | Age: 18
End: 2023-06-30
Payer: COMMERCIAL

## 2023-06-30 NOTE — TELEPHONE ENCOUNTER
----- Message from Zandra Matthews sent at 6/30/2023  8:41 AM CDT -----  Name of Who is Calling:YESENIA PIKE [[id number]]           What is the request in detail:pt stated that she would like to speak with the office directly in regards to her questions/concerns.Please contact to further discuss and advise.            Can the clinic reply by [facility]:[phone]           What Number to Call Back if not in [facility]:[phone]

## 2023-07-03 ENCOUNTER — TELEPHONE (OUTPATIENT)
Dept: OBSTETRICS AND GYNECOLOGY | Facility: CLINIC | Age: 18
End: 2023-07-03
Payer: COMMERCIAL

## 2023-07-03 NOTE — TELEPHONE ENCOUNTER
Called [phone] to start appeal for pt.     Appeal form being faxed over to us within 24 hours. Pt mother informed.    AUB(N93.9)    Nuvaring and patch  Cant do IUD- fibroid in uterus   hysteroscopic myomectomy- 3/2023  Blood transfusion- 3/20/23 & 6/28/23

## 2023-07-07 ENCOUNTER — PATIENT MESSAGE (OUTPATIENT)
Dept: OBSTETRICS AND GYNECOLOGY | Facility: CLINIC | Age: 18
End: 2023-07-07
Payer: COMMERCIAL

## 2023-07-07 NOTE — LETTER
July 10, 2023      [facility]  [address] [place] [address]  [place] [address]       Patient: Petra Medina   YOB: 2005  Date of Visit: 6/22/2023    To Whom It May Concern:    Genevieve Medina  was at [facility] on 07/10/2023. The patient may return to work/school on 6/23/2023 with no restrictions. If you have any questions or concerns, or if I can be of further assistance, please do not hesitate to contact me.    Sincerely,    Katja Dang MD

## 2023-07-07 NOTE — LETTER
July 10, 2023      [facility]  [address]  [place] [address]       Patient: Petra Medina   YOB: 2005  Date of Visit: 9/22/2023    To Whom It May Concern:    Genevieve Medina  was at [facility] on 07/10/2023. The patient may return to work/school on 9/23/2023 with no restrictions. If you have any questions or concerns, or if I can be of further assistance, please do not hesitate to contact me.    Sincerely,    Katja Dang MD

## 2023-07-10 ENCOUNTER — PATIENT MESSAGE (OUTPATIENT)
Dept: OBSTETRICS AND GYNECOLOGY | Facility: CLINIC | Age: 18
End: 2023-07-10
Payer: COMMERCIAL

## 2023-07-11 ENCOUNTER — DOCUMENTATION ONLY (OUTPATIENT)
Dept: HEMATOLOGY/ONCOLOGY | Facility: CLINIC | Age: 18
End: 2023-07-11

## 2023-07-11 ENCOUNTER — LAB VISIT (OUTPATIENT)
Dept: LAB | Facility: HOSPITAL | Age: 18
End: 2023-07-11
Attending: STUDENT IN AN ORGANIZED HEALTH CARE EDUCATION/TRAINING PROGRAM
Payer: COMMERCIAL

## 2023-07-11 ENCOUNTER — TELEPHONE (OUTPATIENT)
Dept: OBSTETRICS AND GYNECOLOGY | Facility: CLINIC | Age: 18
End: 2023-07-11
Payer: COMMERCIAL

## 2023-07-11 ENCOUNTER — HOSPITAL ENCOUNTER (INPATIENT)
Facility: HOSPITAL | Age: 18
LOS: 1 days | Discharge: HOME | DRG: 812 | End: 2023-07-14
Attending: EMERGENCY MEDICINE | Admitting: EMERGENCY MEDICINE
Payer: COMMERCIAL

## 2023-07-11 ENCOUNTER — TELEPHONE (OUTPATIENT)
Dept: HEMATOLOGY/ONCOLOGY | Facility: CLINIC | Age: 18
End: 2023-07-11
Payer: COMMERCIAL

## 2023-07-11 DIAGNOSIS — R53.1 WEAKNESS: ICD-10-CM

## 2023-07-11 DIAGNOSIS — D64.9 SYMPTOMATIC ANEMIA: Primary | ICD-10-CM

## 2023-07-11 DIAGNOSIS — D50.0 IRON DEFICIENCY ANEMIA DUE TO CHRONIC BLOOD LOSS: ICD-10-CM

## 2023-07-11 DIAGNOSIS — R07.9 CHEST PAIN: ICD-10-CM

## 2023-07-11 PROBLEM — F90.2 ATTENTION DEFICIT HYPERACTIVITY DISORDER (ADHD), COMBINED TYPE: Status: ACTIVE | Noted: 2018-06-06

## 2023-07-11 PROBLEM — N39.0 UTI (URINARY TRACT INFECTION): Status: ACTIVE | Noted: 2023-07-11

## 2023-07-11 PROBLEM — E87.6 HYPOKALEMIA: Status: ACTIVE | Noted: 2023-07-11

## 2023-07-11 LAB
ABO + RH BLD: NORMAL
ALBUMIN SERPL BCP-MCNC: 3.9 G/DL (ref 3.5–5.2)
ALP SERPL-CCNC: 48 U/L (ref 55–135)
ALT SERPL W/O P-5'-P-CCNC: <5 U/L (ref 10–44)
ANION GAP SERPL CALC-SCNC: 16 MMOL/L (ref 8–16)
ANION GAP SERPL CALC-SCNC: 9 MMOL/L (ref 8–16)
ANISOCYTOSIS BLD QL SMEAR: SLIGHT
AST SERPL-CCNC: 13 U/L (ref 10–40)
B-HCG UR QL: NEGATIVE
BACTERIA #/AREA URNS HPF: ABNORMAL /HPF
BASOPHILS # BLD AUTO: 0.03 K/UL (ref 0–0.2)
BASOPHILS # BLD AUTO: 0.04 K/UL (ref 0–0.2)
BASOPHILS NFR BLD: 0.6 % (ref 0–1.9)
BASOPHILS NFR BLD: 0.7 % (ref 0–1.9)
BILIRUB SERPL-MCNC: 0.2 MG/DL (ref 0.1–1)
BILIRUB UR QL STRIP: ABNORMAL
BLD GP AB SCN CELLS X3 SERPL QL: NORMAL
BLD PROD TYP BPU: NORMAL
BLOOD UNIT EXPIRATION DATE: NORMAL
BLOOD UNIT TYPE CODE: 5100
BLOOD UNIT TYPE: NORMAL
BUN SERPL-MCNC: 5 MG/DL (ref 6–30)
BUN SERPL-MCNC: 7 MG/DL (ref 6–20)
CALCIUM SERPL-MCNC: 9.1 MG/DL (ref 8.7–10.5)
CHLORIDE SERPL-SCNC: 105 MMOL/L (ref 95–110)
CHLORIDE SERPL-SCNC: 108 MMOL/L (ref 95–110)
CLARITY UR: ABNORMAL
CO2 SERPL-SCNC: 21 MMOL/L (ref 23–29)
CODING SYSTEM: NORMAL
COLOR UR: ABNORMAL
CREAT SERPL-MCNC: 0.5 MG/DL (ref 0.5–1.4)
CREAT SERPL-MCNC: 0.7 MG/DL (ref 0.5–1.4)
CROSSMATCH INTERPRETATION: NORMAL
CTP QC/QA: YES
DIFFERENTIAL METHOD: ABNORMAL
DIFFERENTIAL METHOD: ABNORMAL
DISPENSE STATUS: NORMAL
EOSINOPHIL # BLD AUTO: 0.1 K/UL (ref 0–0.5)
EOSINOPHIL # BLD AUTO: 0.1 K/UL (ref 0–0.5)
EOSINOPHIL NFR BLD: 1.3 % (ref 0–8)
EOSINOPHIL NFR BLD: 1.7 % (ref 0–8)
ERYTHROCYTE [DISTWIDTH] IN BLOOD BY AUTOMATED COUNT: 18.7 % (ref 11.5–14.5)
ERYTHROCYTE [DISTWIDTH] IN BLOOD BY AUTOMATED COUNT: 19.6 % (ref 11.5–14.5)
EST. GFR  (NO RACE VARIABLE): >60 ML/MIN/1.73 M^2
FERRITIN SERPL-MCNC: <1 NG/ML (ref 20–300)
GIANT PLATELETS BLD QL SMEAR: PRESENT
GLUCOSE SERPL-MCNC: 131 MG/DL (ref 70–110)
GLUCOSE SERPL-MCNC: 92 MG/DL (ref 70–110)
GLUCOSE UR QL STRIP: ABNORMAL
HCT VFR BLD AUTO: 18 % (ref 37–48.5)
HCT VFR BLD AUTO: 18.6 % (ref 37–48.5)
HCT VFR BLD CALC: 19 %PCV (ref 36–54)
HGB BLD-MCNC: 5.2 G/DL (ref 12–16)
HGB BLD-MCNC: 5.2 G/DL (ref 12–16)
HGB UR QL STRIP: ABNORMAL
HYALINE CASTS #/AREA URNS LPF: 0 /LPF
HYPOCHROMIA BLD QL SMEAR: ABNORMAL
IMM GRANULOCYTES # BLD AUTO: 0 K/UL (ref 0–0.04)
IMM GRANULOCYTES # BLD AUTO: 0.01 K/UL (ref 0–0.04)
IMM GRANULOCYTES NFR BLD AUTO: 0 % (ref 0–0.5)
IMM GRANULOCYTES NFR BLD AUTO: 0.2 % (ref 0–0.5)
INR PPP: 1 (ref 0.8–1.2)
IRON SERPL-MCNC: <10 UG/DL (ref 30–160)
KETONES UR QL STRIP: ABNORMAL
LEUKOCYTE ESTERASE UR QL STRIP: ABNORMAL
LIPASE SERPL-CCNC: 18 U/L (ref 4–60)
LYMPHOCYTES # BLD AUTO: 1.1 K/UL (ref 1–4.8)
LYMPHOCYTES # BLD AUTO: 1.8 K/UL (ref 1–4.8)
LYMPHOCYTES NFR BLD: 26.4 % (ref 18–48)
LYMPHOCYTES NFR BLD: 29.1 % (ref 18–48)
MCH RBC QN AUTO: 20.4 PG (ref 27–31)
MCH RBC QN AUTO: 21.1 PG (ref 27–31)
MCHC RBC AUTO-ENTMCNC: 28 G/DL (ref 32–36)
MCHC RBC AUTO-ENTMCNC: 28.9 G/DL (ref 32–36)
MCV RBC AUTO: 73 FL (ref 82–98)
MCV RBC AUTO: 73 FL (ref 82–98)
MICROSCOPIC COMMENT: ABNORMAL
MONOCYTES # BLD AUTO: 0.3 K/UL (ref 0.3–1)
MONOCYTES # BLD AUTO: 0.6 K/UL (ref 0.3–1)
MONOCYTES NFR BLD: 8.5 % (ref 4–15)
MONOCYTES NFR BLD: 9.6 % (ref 4–15)
NEUTROPHILS # BLD AUTO: 2.5 K/UL (ref 1.8–7.7)
NEUTROPHILS # BLD AUTO: 3.7 K/UL (ref 1.8–7.7)
NEUTROPHILS NFR BLD: 59.2 % (ref 38–73)
NEUTROPHILS NFR BLD: 62.7 % (ref 38–73)
NITRITE UR QL STRIP: NEGATIVE
NRBC BLD-RTO: 0 /100 WBC
NRBC BLD-RTO: 0 /100 WBC
OVALOCYTES BLD QL SMEAR: ABNORMAL
PH UR STRIP: >8 [PH] (ref 5–8)
PLATELET # BLD AUTO: 276 K/UL (ref 150–450)
PLATELET # BLD AUTO: 337 K/UL (ref 150–450)
PLATELET BLD QL SMEAR: ABNORMAL
PMV BLD AUTO: 12.5 FL (ref 9.2–12.9)
PMV BLD AUTO: 12.7 FL (ref 9.2–12.9)
POC IONIZED CALCIUM: 1.18 MMOL/L (ref 1.06–1.42)
POC TCO2 (MEASURED): 19 MMOL/L (ref 23–29)
POIKILOCYTOSIS BLD QL SMEAR: SLIGHT
POTASSIUM BLD-SCNC: 3.3 MMOL/L (ref 3.5–5.1)
POTASSIUM SERPL-SCNC: 3 MMOL/L (ref 3.5–5.1)
PROT SERPL-MCNC: 6.7 G/DL (ref 6–8.4)
PROT UR QL STRIP: ABNORMAL
PROTHROMBIN TIME: 10.9 SEC (ref 9–12.5)
RBC # BLD AUTO: 2.46 M/UL (ref 4–5.4)
RBC # BLD AUTO: 2.55 M/UL (ref 4–5.4)
RBC #/AREA URNS HPF: >100 /HPF (ref 0–4)
SAMPLE: ABNORMAL
SATURATED IRON: ABNORMAL % (ref 20–50)
SODIUM BLD-SCNC: 139 MMOL/L (ref 136–145)
SODIUM SERPL-SCNC: 135 MMOL/L (ref 136–145)
SP GR UR STRIP: 1.01 (ref 1–1.03)
SPECIMEN OUTDATE: NORMAL
SPHEROCYTES BLD QL SMEAR: ABNORMAL
TOTAL IRON BINDING CAPACITY: 518 UG/DL (ref 250–450)
TRANS ERYTHROCYTES VOL PATIENT: NORMAL ML
TRANSFERRIN SERPL-MCNC: 350 MG/DL (ref 200–375)
TSH SERPL DL<=0.005 MIU/L-ACNC: 2.65 UIU/ML (ref 0.4–4)
URN SPEC COLLECT METH UR: ABNORMAL
UROBILINOGEN UR STRIP-ACNC: ABNORMAL EU/DL
WBC # BLD AUTO: 4.01 K/UL (ref 3.9–12.7)
WBC # BLD AUTO: 6.23 K/UL (ref 3.9–12.7)
WBC #/AREA URNS HPF: 80 /HPF (ref 0–5)

## 2023-07-11 PROCEDURE — 84132 ASSAY OF SERUM POTASSIUM: CPT

## 2023-07-11 PROCEDURE — 85014 HEMATOCRIT: CPT

## 2023-07-11 PROCEDURE — 81000 URINALYSIS NONAUTO W/SCOPE: CPT | Performed by: EMERGENCY MEDICINE

## 2023-07-11 PROCEDURE — 83690 ASSAY OF LIPASE: CPT | Performed by: EMERGENCY MEDICINE

## 2023-07-11 PROCEDURE — G0378 HOSPITAL OBSERVATION PER HR: HCPCS

## 2023-07-11 PROCEDURE — 86920 COMPATIBILITY TEST SPIN: CPT | Performed by: STUDENT IN AN ORGANIZED HEALTH CARE EDUCATION/TRAINING PROGRAM

## 2023-07-11 PROCEDURE — 96360 HYDRATION IV INFUSION INIT: CPT | Mod: 59

## 2023-07-11 PROCEDURE — 84443 ASSAY THYROID STIM HORMONE: CPT | Performed by: EMERGENCY MEDICINE

## 2023-07-11 PROCEDURE — 80053 COMPREHEN METABOLIC PANEL: CPT | Performed by: EMERGENCY MEDICINE

## 2023-07-11 PROCEDURE — 87086 URINE CULTURE/COLONY COUNT: CPT | Performed by: EMERGENCY MEDICINE

## 2023-07-11 PROCEDURE — P9021 RED BLOOD CELLS UNIT: HCPCS | Performed by: PHYSICIAN ASSISTANT

## 2023-07-11 PROCEDURE — 93010 ELECTROCARDIOGRAM REPORT: CPT | Mod: ,,, | Performed by: INTERNAL MEDICINE

## 2023-07-11 PROCEDURE — 81025 URINE PREGNANCY TEST: CPT | Performed by: EMERGENCY MEDICINE

## 2023-07-11 PROCEDURE — 85025 COMPLETE CBC W/AUTO DIFF WBC: CPT | Performed by: STUDENT IN AN ORGANIZED HEALTH CARE EDUCATION/TRAINING PROGRAM

## 2023-07-11 PROCEDURE — 86920 COMPATIBILITY TEST SPIN: CPT | Performed by: PHYSICIAN ASSISTANT

## 2023-07-11 PROCEDURE — 96365 THER/PROPH/DIAG IV INF INIT: CPT

## 2023-07-11 PROCEDURE — 82728 ASSAY OF FERRITIN: CPT | Performed by: STUDENT IN AN ORGANIZED HEALTH CARE EDUCATION/TRAINING PROGRAM

## 2023-07-11 PROCEDURE — 84295 ASSAY OF SERUM SODIUM: CPT

## 2023-07-11 PROCEDURE — 36430 TRANSFUSION BLD/BLD COMPNT: CPT

## 2023-07-11 PROCEDURE — 99900035 HC TECH TIME PER 15 MIN (STAT)

## 2023-07-11 PROCEDURE — 86900 BLOOD TYPING SEROLOGIC ABO: CPT | Performed by: EMERGENCY MEDICINE

## 2023-07-11 PROCEDURE — 85610 PROTHROMBIN TIME: CPT | Performed by: EMERGENCY MEDICINE

## 2023-07-11 PROCEDURE — 82330 ASSAY OF CALCIUM: CPT

## 2023-07-11 PROCEDURE — 93010 EKG 12-LEAD: ICD-10-PCS | Mod: ,,, | Performed by: INTERNAL MEDICINE

## 2023-07-11 PROCEDURE — 99285 EMERGENCY DEPT VISIT HI MDM: CPT | Mod: 25

## 2023-07-11 PROCEDURE — 84466 ASSAY OF TRANSFERRIN: CPT | Performed by: STUDENT IN AN ORGANIZED HEALTH CARE EDUCATION/TRAINING PROGRAM

## 2023-07-11 PROCEDURE — 85025 COMPLETE CBC W/AUTO DIFF WBC: CPT | Mod: 91 | Performed by: EMERGENCY MEDICINE

## 2023-07-11 PROCEDURE — 36415 COLL VENOUS BLD VENIPUNCTURE: CPT | Performed by: STUDENT IN AN ORGANIZED HEALTH CARE EDUCATION/TRAINING PROGRAM

## 2023-07-11 PROCEDURE — 93005 ELECTROCARDIOGRAM TRACING: CPT

## 2023-07-11 PROCEDURE — 82565 ASSAY OF CREATININE: CPT

## 2023-07-11 PROCEDURE — 80047 BASIC METABLC PNL IONIZED CA: CPT

## 2023-07-11 PROCEDURE — 25000003 PHARM REV CODE 250: Performed by: EMERGENCY MEDICINE

## 2023-07-11 RX ORDER — TALC
6 POWDER (GRAM) TOPICAL NIGHTLY PRN
Status: DISCONTINUED | OUTPATIENT
Start: 2023-07-11 | End: 2023-07-14 | Stop reason: HOSPADM

## 2023-07-11 RX ORDER — ONDANSETRON HYDROCHLORIDE 8 MG/1
8 TABLET, FILM COATED ORAL 2 TIMES DAILY
COMMUNITY

## 2023-07-11 RX ORDER — POLYETHYLENE GLYCOL 3350 17 G/17G
17 POWDER, FOR SOLUTION ORAL DAILY
Status: DISCONTINUED | OUTPATIENT
Start: 2023-07-12 | End: 2023-07-14 | Stop reason: HOSPADM

## 2023-07-11 RX ORDER — NALOXONE HCL 0.4 MG/ML
0.02 VIAL (ML) INJECTION
Status: DISCONTINUED | OUTPATIENT
Start: 2023-07-11 | End: 2023-07-14 | Stop reason: HOSPADM

## 2023-07-11 RX ORDER — ACETAMINOPHEN 325 MG/1
650 TABLET ORAL EVERY 4 HOURS PRN
Status: DISCONTINUED | OUTPATIENT
Start: 2023-07-11 | End: 2023-07-14 | Stop reason: HOSPADM

## 2023-07-11 RX ORDER — MAG HYDROX/ALUMINUM HYD/SIMETH 200-200-20
30 SUSPENSION, ORAL (FINAL DOSE FORM) ORAL 4 TIMES DAILY PRN
Status: DISCONTINUED | OUTPATIENT
Start: 2023-07-11 | End: 2023-07-14 | Stop reason: HOSPADM

## 2023-07-11 RX ORDER — LANOLIN ALCOHOL/MO/W.PET/CERES
1 CREAM (GRAM) TOPICAL DAILY
Status: DISCONTINUED | OUTPATIENT
Start: 2023-07-12 | End: 2023-07-12

## 2023-07-11 RX ORDER — KETOROLAC TROMETHAMINE 30 MG/ML
15 INJECTION, SOLUTION INTRAMUSCULAR; INTRAVENOUS EVERY 6 HOURS PRN
Status: DISCONTINUED | OUTPATIENT
Start: 2023-07-12 | End: 2023-07-13

## 2023-07-11 RX ORDER — HYDROCODONE BITARTRATE AND ACETAMINOPHEN 500; 5 MG/1; MG/1
TABLET ORAL
Status: DISCONTINUED | OUTPATIENT
Start: 2023-07-11 | End: 2023-07-14 | Stop reason: HOSPADM

## 2023-07-11 RX ORDER — PROCHLORPERAZINE EDISYLATE 5 MG/ML
5 INJECTION INTRAMUSCULAR; INTRAVENOUS EVERY 6 HOURS PRN
Status: DISCONTINUED | OUTPATIENT
Start: 2023-07-11 | End: 2023-07-13

## 2023-07-11 RX ORDER — FERROUS SULFATE 325(65) MG
325 TABLET ORAL
COMMUNITY

## 2023-07-11 RX ORDER — ONDANSETRON 8 MG/1
8 TABLET, ORALLY DISINTEGRATING ORAL EVERY 8 HOURS PRN
Status: DISCONTINUED | OUTPATIENT
Start: 2023-07-11 | End: 2023-07-14 | Stop reason: HOSPADM

## 2023-07-11 RX ADMIN — SODIUM CHLORIDE 2000 ML: 9 INJECTION, SOLUTION INTRAVENOUS at 07:07

## 2023-07-11 RX ADMIN — CEFTRIAXONE 1 G: 1 INJECTION, POWDER, FOR SOLUTION INTRAMUSCULAR; INTRAVENOUS at 11:07

## 2023-07-11 RX ADMIN — POTASSIUM BICARBONATE 25 MEQ: 977.5 TABLET, EFFERVESCENT ORAL at 11:07

## 2023-07-11 NOTE — TELEPHONE ENCOUNTER
----- Message from Cyndie Munoz MA sent at 7/11/2023  4:26 PM CDT -----    ----- Message -----  From: Helen Rodriguez  Sent: 7/11/2023   3:43 PM CDT  To: [facility] Staff    Pt mother Mecca would like a call back in reference to her daughter. She received a call from Dr. Sanches in hematology and was told to bring her daughter to the ER. She would like to speak with someone from  office before going to ER. # [phone].

## 2023-07-11 NOTE — FIRST PROVIDER EVALUATION
Medical screening examination initiated.  I have conducted a focused provider triage encounter, findings are as follows:    Brief history of present illness:  17 y.o. female   Past Medical History:   Diagnosis Date    ADD (attention deficit disorder with hyperactivity)     Anemia, unspecified       Fibroid removal several months ago. Persistent vaginal bleeding worse for the week,  goes through 6-8 pads/8 hours. Recently changed birthcontrol. Transfused approx 2 weeks ago. Endorses easy fatigue, LAKE. Hgb checked prior to arrival was 5.2 down from 6.4 two weeks ago.    There were no vitals filed for this visit.    Pertinent physical exam:    Vitals:    07/11/23 1710   BP: (!) 153/89   Pulse: 108   Resp: 18   Temp: 98.4 °F (36.9 °C)       Pt is well appearing, sitting up in no distress  HEADt: normocephalic, atraumatic  Eyes: EOMI, PERRL, ANICTERIC  Chest: normal chest expansion, no respiratory distress  CV: normal rate  Abd: non distended  Ext: no edema  Psych: linear goal directed thinking, no si/hi  Neuro: no tremor      Brief workup plan:  screening labs, likely transfusion.    Preliminary workup initiated; this workup will be continued and followed by the physician or advanced practice provider that is assigned to the patient when roomed.

## 2023-07-11 NOTE — TELEPHONE ENCOUNTER
Called Ms Petra Mcgarrybaldomeroaxel--cell number, No answer, message left on her voice mail. Informed Ms Petra in her message, Dr Sanches received critical results of today labs drawn. Hgb 5.2 and Hct 18, and she need to go to the ER urgently today.. Called Ms Lambert home number, spoke with Ms Mecca Medina--her Mother. Informed Ms Wing, I called Ms Lambert cell, no answer. Ms Wing states she was currently at work. Ms Wing states she saw Ms Lambert lab results on the patient portal and she noted the abnormal lab results. Informed Ms Wing, Dr Sanches requesting Ms Lambert go to the ER urgently. Ms Wing will get in touch with Ms Lambert they will proceed to the ER.

## 2023-07-11 NOTE — ED NOTES
Pt was provided a specimen cup and asked to provide another urine sample. She states she is unable to void at this time.

## 2023-07-12 LAB
ANION GAP SERPL CALC-SCNC: 6 MMOL/L (ref 8–16)
BASOPHILS # BLD AUTO: 0.05 K/UL (ref 0–0.2)
BASOPHILS NFR BLD: 0.7 % (ref 0–1.9)
BLD PROD TYP BPU: NORMAL
BLD PROD TYP BPU: NORMAL
BLOOD UNIT EXPIRATION DATE: NORMAL
BLOOD UNIT EXPIRATION DATE: NORMAL
BLOOD UNIT TYPE CODE: 5100
BLOOD UNIT TYPE CODE: 5100
BLOOD UNIT TYPE: NORMAL
BLOOD UNIT TYPE: NORMAL
BUN SERPL-MCNC: 5 MG/DL (ref 6–20)
CALCIUM SERPL-MCNC: 7.9 MG/DL (ref 8.7–10.5)
CHLORIDE SERPL-SCNC: 112 MMOL/L (ref 95–110)
CO2 SERPL-SCNC: 23 MMOL/L (ref 23–29)
CODING SYSTEM: NORMAL
CODING SYSTEM: NORMAL
CREAT SERPL-MCNC: 0.6 MG/DL (ref 0.5–1.4)
CROSSMATCH INTERPRETATION: NORMAL
CROSSMATCH INTERPRETATION: NORMAL
DIFFERENTIAL METHOD: ABNORMAL
DISPENSE STATUS: NORMAL
DISPENSE STATUS: NORMAL
EOSINOPHIL # BLD AUTO: 0.1 K/UL (ref 0–0.5)
EOSINOPHIL NFR BLD: 2.1 % (ref 0–8)
ERYTHROCYTE [DISTWIDTH] IN BLOOD BY AUTOMATED COUNT: 19.8 % (ref 11.5–14.5)
EST. GFR  (NO RACE VARIABLE): >60 ML/MIN/1.73 M^2
GLUCOSE SERPL-MCNC: 119 MG/DL (ref 70–110)
HCT VFR BLD AUTO: 24.1 % (ref 37–48.5)
HCT VFR BLD AUTO: 25.2 % (ref 37–48.5)
HGB BLD-MCNC: 7.4 G/DL (ref 12–16)
HGB BLD-MCNC: 7.7 G/DL (ref 12–16)
IMM GRANULOCYTES # BLD AUTO: 0.01 K/UL (ref 0–0.04)
IMM GRANULOCYTES NFR BLD AUTO: 0.1 % (ref 0–0.5)
LYMPHOCYTES # BLD AUTO: 1.5 K/UL (ref 1–4.8)
LYMPHOCYTES NFR BLD: 22.4 % (ref 18–48)
MAGNESIUM SERPL-MCNC: 1.8 MG/DL (ref 1.6–2.6)
MCH RBC QN AUTO: 24 PG (ref 27–31)
MCHC RBC AUTO-ENTMCNC: 30.7 G/DL (ref 32–36)
MCV RBC AUTO: 78 FL (ref 82–98)
MONOCYTES # BLD AUTO: 0.7 K/UL (ref 0.3–1)
MONOCYTES NFR BLD: 10.4 % (ref 4–15)
NEUTROPHILS # BLD AUTO: 4.3 K/UL (ref 1.8–7.7)
NEUTROPHILS NFR BLD: 64.3 % (ref 38–73)
NRBC BLD-RTO: 0 /100 WBC
PLATELET # BLD AUTO: 249 K/UL (ref 150–450)
PMV BLD AUTO: ABNORMAL FL (ref 9.2–12.9)
POTASSIUM SERPL-SCNC: 4 MMOL/L (ref 3.5–5.1)
RBC # BLD AUTO: 3.08 M/UL (ref 4–5.4)
SODIUM SERPL-SCNC: 141 MMOL/L (ref 136–145)
TRANS ERYTHROCYTES VOL PATIENT: NORMAL ML
TRANS ERYTHROCYTES VOL PATIENT: NORMAL ML
WBC # BLD AUTO: 6.74 K/UL (ref 3.9–12.7)

## 2023-07-12 PROCEDURE — 85025 COMPLETE CBC W/AUTO DIFF WBC: CPT

## 2023-07-12 PROCEDURE — 80048 BASIC METABOLIC PNL TOTAL CA: CPT

## 2023-07-12 PROCEDURE — 25000003 PHARM REV CODE 250: Performed by: STUDENT IN AN ORGANIZED HEALTH CARE EDUCATION/TRAINING PROGRAM

## 2023-07-12 PROCEDURE — G0378 HOSPITAL OBSERVATION PER HR: HCPCS

## 2023-07-12 PROCEDURE — 85018 HEMOGLOBIN: CPT | Performed by: INTERNAL MEDICINE

## 2023-07-12 PROCEDURE — P9021 RED BLOOD CELLS UNIT: HCPCS | Performed by: PHYSICIAN ASSISTANT

## 2023-07-12 PROCEDURE — 63600175 PHARM REV CODE 636 W HCPCS

## 2023-07-12 PROCEDURE — 96375 TX/PRO/DX INJ NEW DRUG ADDON: CPT

## 2023-07-12 PROCEDURE — 25000003 PHARM REV CODE 250

## 2023-07-12 PROCEDURE — 36415 COLL VENOUS BLD VENIPUNCTURE: CPT | Performed by: INTERNAL MEDICINE

## 2023-07-12 PROCEDURE — 99213 OFFICE O/P EST LOW 20 MIN: CPT | Mod: ,,, | Performed by: OBSTETRICS & GYNECOLOGY

## 2023-07-12 PROCEDURE — 83735 ASSAY OF MAGNESIUM: CPT

## 2023-07-12 PROCEDURE — 85014 HEMATOCRIT: CPT | Performed by: INTERNAL MEDICINE

## 2023-07-12 PROCEDURE — 99213 PR OFFICE/OUTPT VISIT, EST, LEVL III, 20-29 MIN: ICD-10-PCS | Mod: ,,, | Performed by: OBSTETRICS & GYNECOLOGY

## 2023-07-12 RX ORDER — SODIUM CHLORIDE 9 MG/ML
INJECTION, SOLUTION INTRAVENOUS CONTINUOUS
Status: CANCELLED | OUTPATIENT
Start: 2023-07-12

## 2023-07-12 RX ORDER — SODIUM CHLORIDE 0.9 % (FLUSH) 0.9 %
10 SYRINGE (ML) INJECTION
Status: CANCELLED | OUTPATIENT
Start: 2023-07-12

## 2023-07-12 RX ORDER — HEPARIN 100 UNIT/ML
5 SYRINGE INTRAVENOUS
Status: CANCELLED | OUTPATIENT
Start: 2023-07-12

## 2023-07-12 RX ORDER — LANOLIN ALCOHOL/MO/W.PET/CERES
1 CREAM (GRAM) TOPICAL 2 TIMES DAILY
Status: DISCONTINUED | OUTPATIENT
Start: 2023-07-12 | End: 2023-07-14 | Stop reason: HOSPADM

## 2023-07-12 RX ORDER — DIPHENHYDRAMINE HYDROCHLORIDE 50 MG/ML
50 INJECTION INTRAMUSCULAR; INTRAVENOUS ONCE AS NEEDED
Status: CANCELLED | OUTPATIENT
Start: 2023-07-12

## 2023-07-12 RX ORDER — EPINEPHRINE 0.3 MG/.3ML
0.3 INJECTION SUBCUTANEOUS ONCE AS NEEDED
Status: CANCELLED | OUTPATIENT
Start: 2023-07-12

## 2023-07-12 RX ADMIN — ACETAMINOPHEN 650 MG: 325 TABLET ORAL at 12:07

## 2023-07-12 RX ADMIN — FERROUS SULFATE TAB 325 MG (65 MG ELEMENTAL FE) 1 EACH: 325 (65 FE) TAB at 08:07

## 2023-07-12 RX ADMIN — ONDANSETRON 8 MG: 8 TABLET, ORALLY DISINTEGRATING ORAL at 12:07

## 2023-07-12 RX ADMIN — KETOROLAC TROMETHAMINE 15 MG: 30 INJECTION, SOLUTION INTRAMUSCULAR; INTRAVENOUS at 04:07

## 2023-07-12 RX ADMIN — FERROUS SULFATE TAB 325 MG (65 MG ELEMENTAL FE) 1 EACH: 325 (65 FE) TAB at 09:07

## 2023-07-12 NOTE — SUBJECTIVE & OBJECTIVE
Interval History: Pt continues to have some lower abd pain that she states is chronic and has been present for the past year. No worsening of sxs. No n/v/d. Awaiting gyn evaluation.    Review of Systems  Objective:     Vital Signs (Most Recent):  Temp: 98.6 °F (37 °C) (07/12/23 0423)  Pulse: 93 (07/12/23 0815)  Resp: 16 (07/12/23 0423)  BP: (!) 93/54 (07/12/23 0815)  SpO2: 96 % (07/12/23 0815) Vital Signs (24h Range):  Temp:  [98.4 °F (36.9 °C)-98.8 °F (37.1 °C)] 98.6 °F (37 °C)  Pulse:  [] 93  Resp:  [16-28] 16  SpO2:  [94 %-100 %] 96 %  BP: ()/(51-89) 93/54     Weight: 51.7 kg (114 lb)  Body mass index is 20.19 kg/m².    Intake/Output Summary (Last 24 hours) at 7/12/2023 1206  Last data filed at 7/12/2023 0424  Gross per 24 hour   Intake 1267.5 ml   Output --   Net 1267.5 ml         Physical Exam  Vitals reviewed.   Constitutional:       General: She is not in acute distress.     Appearance: She is not toxic-appearing.   HENT:      Head: Normocephalic and atraumatic.      Mouth/Throat:      Mouth: Mucous membranes are moist.      Pharynx: Oropharynx is clear.   Eyes:      General: No scleral icterus.     Extraocular Movements: Extraocular movements intact.   Abdominal:      General: There is no distension.      Palpations: Abdomen is soft.      Tenderness: There is abdominal tenderness (lower abd). There is no guarding or rebound.   Neurological:      General: No focal deficit present.      Mental Status: She is alert and oriented to person, place, and time.   Psychiatric:         Mood and Affect: Mood normal.         Behavior: Behavior normal.           Significant Labs: All pertinent labs within the past 24 hours have been reviewed.    Significant Imaging: I have reviewed all pertinent imaging results/findings within the past 24 hours.

## 2023-07-12 NOTE — MEDICAL/APP STUDENT
History     Chief Complaint   Patient presents with    abnormal labs     Pt sent to ER for abnormal lab. H/H 5/18. Pt report + SOB, weakness, dizziness, syncope on Friday. Pt reports had fibroid removed months ago and has been bleeding since. Pt stated goes through 4-6 pads within an 8hr time frame. Last transfusion 2 weeks ago      HPI:Petra Medina 17 y.o. female with anxiety in abnormal uterine bleeding presents to the hospital with a chief complaint of generalized weakness.  Associated symptoms include fatigue, intermittent SOB, dizziness and syncope.  Patient was seen at her PCM due to associated symptoms where they willie labs that showed H/H 5/18, patient was informed and recommended to report to ER.  Patient states has been ongoing about 1.5 weeks.  Patient was with history of dysfunctional uterine bleeding states I have been constantly bleeding since November.  Multiple iron and blood transfusion history with most recent 2 weeks ago.  Patient states she is been going through about a pad per hour (6-8 in an 8 hour period).  Pt did have syncopal episode on Friday during which patient was walking and fell and hit her nose.  She reports she was only unconscious for approximately 1 minute because she knew what time she walked outside.  She reports she is throbbing headache that are consistent with a history of anemia.  No visual disturbance, weakness, paresthesias, nausea vomiting.  Patient denied any other attempted self-treatment no other alleviating or exacerbating factors noted.  Patient follows with Michael Mueller III, MD OBGYN.      In the ED patient was afebrile without leukocytosis, H/H 5.2/18.6, microcytic hypochromic anemia noted, iron less than 10 TIBC 518, ferritin less than 1, potassium 3.0, alkaline phosphatase 48, ALT less than 5, pregnancy test negative, O positive indirect Hawa negative, UA cloudy red urine pH greater than 8, 3+ protein 1+ ketone, elevated urobilinogen, +3  "leukocytes, few bacteria, 80 WBCs, RBCs >100.  Transvaginal ultrasound showed "Abnormal heterogenous thickened appearance of the endometrium measuring 35 mm.  Potential blood products and/or clot seen within the endometrial cavity. Posterior uterine fibroid. Free fluid seen in the pelvis which is greater than normal expected physiologic volume" patient was placed in observation for further workup and management of symptomatic anemia        Interval History 7/12/23: NAEO. Patient is resting comfortably. She reports some fatigue and mild lower abd pain that is chronic and unchanged. States she feels better than when she came in. She denies any fever, chills, HA, dizziness, lightheadedness, CP, SOB, dysuria, n/v/d, or any other sxs at this time.   Patient has AUB due to fibroids and is s/p hysteroscopic myomectomy in 3/2023 for a large fibroid. She takes iron supplements at home. She was on nuvaring, and has been changed to myfembree. She has had previous infusions with hem/onc, total of 3. Last infusion was 2 weeks ago. She has a f/u appt with PCP and Hem/Onc next week.         Past Medical History:   Diagnosis Date    ADD (attention deficit disorder with hyperactivity)     Anemia, unspecified        Past Surgical History:   Procedure Laterality Date    DIAGNOSTIC LAPAROSCOPY N/A 3/21/2023    Procedure: LAPAROSCOPY, DIAGNOSTIC;  Surgeon: Katja Dang MD;  Location: [facility];  Service: OB/GYN;  Laterality: N/A;    HYSTEROSCOPIC RESECTION OF MYOMA N/A 3/21/2023    Procedure: MYOMECTOMY, HYSTEROSCOPIC;  Surgeon: Katja Dang MD;  Location: [facility];  Service: OB/GYN;  Laterality: N/A;       Family History   Problem Relation Age of Onset    Heart attacks under age 50 Neg Hx     Early death Neg Hx     Congenital heart disease Neg Hx     Breast cancer Neg Hx     Colon cancer Neg Hx     Ovarian cancer Neg Hx        Social History     Tobacco Use    Smoking status: Never    Smokeless tobacco: Never    Tobacco " "comments:     VAPING   Substance Use Topics    Alcohol use: No     Comment: SOCIAL    Drug use: No       Review of Systems   Constitutional:  Positive for fatigue. Negative for chills and fever.   HENT:  Negative for congestion, sore throat and trouble swallowing.    Eyes:  Negative for visual disturbance.   Respiratory:  Negative for shortness of breath.    Cardiovascular:  Negative for chest pain and leg swelling.   Gastrointestinal:  Positive for abdominal pain (lower, chronic). Negative for diarrhea, nausea and vomiting.   Genitourinary:  Positive for vaginal bleeding. Negative for dysuria.   Musculoskeletal:  Negative for myalgias.   Skin:  Negative for rash.   Neurological:  Negative for dizziness and headaches.     Physical Exam   BP (!) 93/54   Pulse 93   Temp 98.6 °F (37 °C) (Oral)   Resp 16   Ht 5' 3" (1.6 m)   Wt 51.7 kg (114 lb)   LMP 11/22/2022   SpO2 96%   BMI 20.19 kg/m²     Physical Exam    Nursing note and vitals reviewed.  Constitutional: She appears well-developed and well-nourished. No distress.   HENT:   Head: Normocephalic and atraumatic.   Right Ear: External ear normal.   Left Ear: External ear normal.   Nose: Nose normal.   Eyes: Conjunctivae and EOM are normal. Pupils are equal, round, and reactive to light.   Neck: Neck supple.   Normal range of motion.  Cardiovascular:  Normal rate and regular rhythm.           Pulmonary/Chest: Breath sounds normal. No respiratory distress. She has no wheezes. She has no rhonchi. She has no rales.   Abdominal: Abdomen is soft. There is no abdominal tenderness.   Musculoskeletal:         General: Normal range of motion.      Cervical back: Normal range of motion and neck supple.     Neurological: She is alert and oriented to person, place, and time.   Skin: Skin is warm.   Psychiatric: She has a normal mood and affect.     LABS: reviewed.     BMP  Component Ref Range & Units 06:29   Sodium 136 - 145 mmol/L 141    Potassium 3.5 - 5.1 mmol/L 4.0  "   Chloride 95 - 110 mmol/L 112 High     CO2 23 - 29 mmol/L 23    Glucose 70 - 110 mg/dL 119 High     BUN 6 - 20 mg/dL 5 Low     Creatinine 0.5 - 1.4 mg/dL 0.6    Calcium 8.7 - 10.5 mg/dL 7.9 Low     Anion Gap 8 - 16 mmol/L 6 Low     eGFR >60 mL/min/1.73 m^2 >60     Mg  Magnesium 1.6 - 2.6 mg/dL 1.8    CBC  Component Ref Range & Units 06:29   WBC 3.90 - 12.70 K/uL 6.74    RBC 4.00 - 5.40 M/uL 3.08 Low     Hemoglobin 12.0 - 16.0 g/dL 7.4 Low     Hematocrit 37.0 - 48.5 % 24.1 Low     MCV 82 - 98 fL 78 Low     MCH 27.0 - 31.0 pg 24.0 Low     MCHC 32.0 - 36.0 g/dL 30.7 Low     RDW 11.5 - 14.5 % 19.8 High     Platelets 150 - 450 K/uL 249    MPV 9.2 - 12.9 fL SEE COMMENT    Comment: Result not available.   Immature Granulocytes 0.0 - 0.5 % 0.1    Gran # (ANC) 1.8 - 7.7 K/uL 4.3    Immature Grans (Abs) 0.00 - 0.04 K/uL 0.01    Comment: Mild elevation in immature granulocytes is non specific and   can be seen in a variety of conditions including stress response,   acute inflammation, trauma and pregnancy. Correlation with other   laboratory and clinical findings is essential.    Lymph # 1.0 - 4.8 K/uL 1.5    Mono # 0.3 - 1.0 K/uL 0.7    Eos # 0.0 - 0.5 K/uL 0.1    Baso # 0.00 - 0.20 K/uL 0.05    nRBC 0 /100 WBC 0    Gran % 38.0 - 73.0 % 64.3    Lymph % 18.0 - 48.0 % 22.4    Mono % 4.0 - 15.0 % 10.4    Eosinophil % 0.0 - 8.0 % 2.1    Basophil % 0.0 - 1.9 % 0.7    Differential Method  Automated    Imaging: reviewed.   US Pelvis 7/11/23:   Impression:  1. Abnormal heterogenous thickened appearance of the endometrium measuring 35 mm.  Potential blood products and/or clot seen within the endometrial cavity.  2. Posterior uterine fibroid.  3. Free fluid seen in the pelvis which is greater than normal expected physiologic volume.    Assessment/Plan:      * Symptomatic anemia  Presented to ED due to intermittent SOB, dizziness, fatigue, near syncopal episode for the last 1.5 weeks along with abnormal vaginal bleeding since 10/22.  Recent labs showed H&H 5.2/18.6 and was told to come to ED.  -Chronic, due to blood loss from dysfunctional uterine bleeding  -Hgb initially 5.2,  2 units PRBC transfused.  -Repeat CBC shows Hgb of 7.4 today  -F/u with CBC in the AM     UTI (urinary tract infection)  Patient presented with UA showing cloudy urine with elevated pH, +3 leukocytes, 80 WBCs and few bacteria.    -Denies any urinary symptoms  -Treated with IV Rocephin     Abnormal uterine bleeding (AUB)   Reports history of heavy menstrual cycles, but reports irregular bleeding since October 2022.   Patient follows with MD GORGE Galvez III and Regina Sanches MD Hem/Onc.  -OBGYN consult              VTE Risk Mitigation (From admission, onward)           Ordered       IP VTE LOW RISK PATIENT  Once         07/11/23 2145       Place sequential compression device  Until discontinued         07/11/23 2145                  DESEAN: Tomorrow     Alaina Arellano PA-S  [facility]

## 2023-07-12 NOTE — ED PROVIDER NOTES
Encounter Date: 7/11/2023       History     Chief Complaint   Patient presents with    abnormal labs     Pt sent to ER for abnormal lab. H/H 5/18. Pt report + SOB, weakness, dizziness, syncope on Friday. Pt reports had fibroid removed months ago and has been bleeding since. Pt stated goes through 4-6 pads within an 8hr time frame. Last transfusion 2 weeks ago      Chief complaint: Abnormal labs    HPI:   17-year-old female status post diagnostic laparoscopy, hysteroscopy, hysteroscopic myomectomy 3/21/2023 with multiple iron transfusions and blood transfusions 2/2 DUB, last transfusion 2 weeks ago presenting for evaluation of 1.5 week history of generalized weakness, fatigue, lightheadedness headache that feels similar to her history of symptomatic anemia. Has had vaginal bleeding since 11/2022 approximately 6-8 pads / 8 hours. Intermittent abdominal cramping that she takes ibuprofen for. No worsening symptoms     Pt did have syncopal episode on Friday during which patient was walking and fell and hit her nose.  She reports she was only unconscious for approximately 1 minute because she knew what time she walked outside.  She reports she is throbbing headache that are consistent with a history of anemia.  No visual disturbance, weakness, paresthesias, nausea vomiting  Had labs drawn by Dr. Milady Howe Onc today with h/h 5/18 and was sent to ED     Is followed by Dr. Dang. Are starting a trial of myfembree    Additional history from her mother who is at bedside       The history is provided by the patient and a parent.     Review of patient's allergies indicates:   Allergen Reactions    Cherries      unknown    Lavender      unknown     Past Medical History:   Diagnosis Date    Abnormal uterine bleeding (AUB)     ADD (attention deficit disorder with hyperactivity)     Anemia, unspecified     Anxiety and depression     Encounter for blood transfusion     Uterine fibroid      Past Surgical History:   Procedure  Laterality Date    DIAGNOSTIC LAPAROSCOPY N/A 3/21/2023    Procedure: LAPAROSCOPY, DIAGNOSTIC;  Surgeon: Katja Dang MD;  Location: [facility] OR;  Service: OB/GYN;  Laterality: N/A;    HYSTEROSCOPIC RESECTION OF MYOMA N/A 3/21/2023    Procedure: MYOMECTOMY, HYSTEROSCOPIC;  Surgeon: Katja Dang MD;  Location: [facility] OR;  Service: OB/GYN;  Laterality: N/A;     Family History   Problem Relation Age of Onset    No Known Problems Mother     Hypertension Father     Heart disease Father     Heart attacks under age 50 Neg Hx     Early death Neg Hx     Congenital heart disease Neg Hx     Breast cancer Neg Hx     Colon cancer Neg Hx     Ovarian cancer Neg Hx      Social History     Tobacco Use    Smoking status: Never    Smokeless tobacco: Never    Tobacco comments:     VAPING   Substance Use Topics    Alcohol use: Not Currently     Comment: SOCIAL    Drug use: No     Review of Systems   Constitutional:  Positive for fatigue. Negative for chills and fever.   HENT:  Negative for congestion, ear pain, nosebleeds, rhinorrhea, sore throat and trouble swallowing.    Eyes:  Negative for redness.   Respiratory:  Negative for cough, shortness of breath and stridor.    Cardiovascular:  Negative for chest pain.   Gastrointestinal:  Positive for abdominal pain. Negative for constipation, diarrhea, nausea and vomiting.   Genitourinary:  Positive for vaginal bleeding. Negative for decreased urine volume, dysuria, frequency, hematuria and urgency.   Musculoskeletal:  Negative for back pain and neck pain.   Skin:  Negative for rash and wound.   Neurological:  Positive for light-headedness and headaches. Negative for dizziness, speech difficulty, weakness and numbness.   Hematological:  Does not bruise/bleed easily.   Psychiatric/Behavioral:  Negative for confusion.        Physical Exam     Initial Vitals [07/11/23 1710]   BP Pulse Resp Temp SpO2   (!) 153/89 108 18 98.4 °F (36.9 °C) 99 %      MAP       --         Physical  Exam    Nursing note and vitals reviewed.  Constitutional: She appears well-developed and well-nourished. No distress.   HENT:   Head: Normocephalic.   Right Ear: External ear normal.   Left Ear: External ear normal.   Eyes: Conjunctivae are normal. Right eye exhibits no discharge. Left eye exhibits no discharge. No scleral icterus.   Neck: No tracheal deviation present.   Cardiovascular:  Normal rate and regular rhythm.     Exam reveals no gallop and no friction rub.       No murmur heard.  Pulmonary/Chest: No stridor. No respiratory distress. She has no wheezes. She has no rhonchi. She has no rales.   Abdominal: Abdomen is soft. She exhibits no distension. There is no abdominal tenderness. There is no rebound and no guarding.   Musculoskeletal:         General: Normal range of motion.     Neurological: She is alert. No sensory deficit.   Skin: Skin is warm and dry. No rash noted. No erythema.   Psychiatric: She has a normal mood and affect. Her behavior is normal. Judgment and thought content normal.         ED Course   Critical Care    Date/Time: 7/11/2023 10:36 PM    Performed by: Selina Joseph PA-C  Authorized by: Darnell Mejia MD  Direct patient critical care time: 15 minutes  Additional history critical care time: 5 minutes  Ordering / reviewing critical care time: 5 minutes  Documentation critical care time: 5 minutes  Consulting other physicians critical care time: 5 minutes  Total critical care time (exclusive of procedural time) : 35 minutes  Critical care time was exclusive of separately billable procedures and treating other patients and teaching time.  Critical care was necessary to treat or prevent imminent or life-threatening deterioration of the following conditions: cardiac failure, circulatory failure and shock.  Critical care was time spent personally by me on the following activities: development of treatment plan with patient or surrogate, discussions with consultants, evaluation  of patient's response to treatment, examination of patient, obtaining history from patient or surrogate, ordering and performing treatments and interventions, ordering and review of laboratory studies, ordering and review of radiographic studies, review of old charts, pulse oximetry and re-evaluation of patient's condition.        Labs Reviewed   CBC W/ AUTO DIFFERENTIAL - Abnormal; Notable for the following components:       Result Value    RBC 2.55 (*)     Hemoglobin 5.2 (*)     Hematocrit 18.6 (*)     MCV 73 (*)     MCH 20.4 (*)     MCHC 28.0 (*)     RDW 19.6 (*)     All other components within normal limits    Narrative:     HGB HCT critical result(s) called and verbal readback obtained from   Mey Graahm 7/11/23 @1800 by CHRISTY 07/11/2023 19:01   COMPREHENSIVE METABOLIC PANEL - Abnormal; Notable for the following components:    Sodium 135 (*)     Potassium 3.0 (*)     CO2 21 (*)     Glucose 131 (*)     Alkaline Phosphatase 48 (*)     ALT <5 (*)     All other components within normal limits   URINALYSIS, REFLEX TO URINE CULTURE - Abnormal; Notable for the following components:    Color, UA Red (*)     Appearance, UA Cloudy (*)     pH, UA >8.0 (*)     Protein, UA 3+ (*)     Glucose, UA Trace (*)     Ketones, UA 1+ (*)     Bilirubin (UA) 3+ (*)     Occult Blood UA 3+ (*)     Urobilinogen, UA 2.0-3.0 (*)     Leukocytes, UA 3+ (*)     All other components within normal limits   URINALYSIS MICROSCOPIC - Abnormal; Notable for the following components:    RBC, UA >100 (*)     WBC, UA 80 (*)     Bacteria Few (*)     All other components within normal limits   BASIC METABOLIC PANEL - Abnormal; Notable for the following components:    Chloride 112 (*)     Glucose 119 (*)     BUN 5 (*)     Calcium 7.9 (*)     Anion Gap 6 (*)     All other components within normal limits   CBC W/ AUTO DIFFERENTIAL - Abnormal; Notable for the following components:    RBC 3.08 (*)     Hemoglobin 7.4 (*)     Hematocrit 24.1 (*)     MCV 78  (*)     MCH 24.0 (*)     MCHC 30.7 (*)     RDW 19.8 (*)     All other components within normal limits   ISTAT PROCEDURE - Abnormal; Notable for the following components:    POC BUN 5 (*)     POC Potassium 3.3 (*)     POC TCO2 (MEASURED) 19 (*)     POC Hematocrit 19 (*)     All other components within normal limits   LIPASE   PROTIME-INR   TSH   MAGNESIUM   POCT URINE PREGNANCY   TYPE & SCREEN   PREPARE RBC SOFT   PREPARE RBC SOFT        ECG Results              EKG 12-lead (Final result)  Result time 07/12/23 20:30:47      Final result by Interface, [facility] (07/12/23 20:30:47)                   Narrative:    Test Reason : R53.1,    Vent. Rate : 115 BPM     Atrial Rate : 115 BPM     P-R Int : 160 ms          QRS Dur : 070 ms      QT Int : 306 ms       P-R-T Axes : 073 072 053 degrees     QTc Int : 423 ms    Sinus tachycardia  Otherwise normal ECG  When compared with ECG of 06-FEB-2018 14:09,  PREVIOUS ECG IS PRESENT  Confirmed by Ruel Steel MD ([id number]) on 7/12/2023 8:30:36 PM    Referred By: [email or web address]   SELF           Confirmed By:Ruel Steel MD                                     EKG 12-LEAD (Final result)  Result time 07/31/23 12:16:16      Final result by Unknown User (07/31/23 12:16:16)                                      Imaging Results              US Pelvis Comp with Transvag NON-OB (xpd) (Final result)  Result time 07/11/23 21:35:08      Final result by Luli Gar MD (07/11/23 21:35:08)                   Impression:      1. Abnormal heterogenous thickened appearance of the endometrium measuring 35 mm.  Potential blood products and/or clot seen within the endometrial cavity.  2. Posterior uterine fibroid.  3. Free fluid seen in the pelvis which is greater than normal expected physiologic volume.      Electronically signed by: Luli Gar MD  Date:    07/11/2023  Time:    21:35               Narrative:    EXAMINATION:  US PELVIS COMP WITH TRANSVAG NON-OB (XPD)    CLINICAL HISTORY:  vaginal  bleeding;    TECHNIQUE:  Transabdominal sonography of the pelvis was performed, followed by transvaginal sonography to better evaluate the uterus and ovaries.    COMPARISON:  04/17/2023.    FINDINGS:  The uterus measures 9 x 6 x 6 cm. Uterine parenchyma demonstrates 4 cm posterior heterogenous fibroid.  The endometrial echo complex is abnormally thickened and heterogenous in appearance and measures 35 mm.    The right ovary measures 3 x 1 x 2 cm. The left ovary measures 3 x 2 x 2 cm. Arterial and venous flow are preserved bilaterally.  Follicles are seen in both ovaries. Fluid is seen within the pelvis which appears greater than normal expected physiologic volume.                                       Medications   sodium chloride 0.9% bolus 2,000 mL 2,000 mL (0 mLs Intravenous Stopped 7/11/23 2044)   potassium bicarbonate disintegrating tablet 25 mEq (25 mEq Oral Given 7/11/23 4804)   sodium chloride 0.9% bolus 1,000 mL 1,000 mL (0 mLs Intravenous Stopped 7/13/23 0550)   iron sucrose (VENOFER) 200 mg in sodium chloride 0.9% 100 mL IVPB (0 mg Intravenous Stopped 7/13/23 1346)     Medical Decision Making:   Clinical Tests:   Lab Tests: Ordered and Reviewed  Radiological Study: Ordered and Reviewed  ED Management:  17-year-old female history of dysfunctional uterine bleeding and transfusions presenting for low H&H after labs drawn this morning by Heme-Onc.  Last transfusion was 2 weeks ago.  Patient is afebrile nontoxic appearing in no distress.  Exam above.  Abdomen soft nontender.  Doubt acute surgical abdomen.  No worsening symptoms.  H&H 5.2 and 18.6.  Transfusion ordered CMP with no significant electrolyte abnormality renal insufficiency. UPT negative. UA with w few bacteria WBCs and RBCs.  No urinary symptoms. Urine culture pending.   Vitals stable. Initially tachy. Resolved after fluids.   Discussed with Livan Ruiz who accepts pt for observation for symptomatic anemia requiring transfusion    Discussed  with Dr. Mejia who also evaluated pt face to face and he agrees with assessment and plan                             Clinical Impression:   Final diagnoses:  [R53.1] Weakness  [D64.9] Symptomatic anemia (Primary)        ED Disposition Condition    Observation Stable                Selina Joseph PA-C  07/11/23 [id number]       Darnell Mejia MD  08/09/23 1018

## 2023-07-12 NOTE — H&P
[facility] - [place] Emergency Dept  [facility]  History & Physical    Patient Name: Petra Medina  MRN: [id number]  Patient Class: OP- Observation  Admission Date: 7/11/2023  Attending Physician: Arpan Baker MD   Primary Care Provider: Kay Urban MD         Patient information was obtained from patient, parent, past medical records and ER records.     Subjective:     Principal Problem:Symptomatic anemia    Chief Complaint:   Chief Complaint   Patient presents with    abnormal labs     Pt sent to ER for abnormal lab. H/H 5/18. Pt report + SOB, weakness, dizziness, syncope on Friday. Pt reports had fibroid removed months ago and has been bleeding since. Pt stated goes through 4-6 pads within an 8hr time frame. Last transfusion 2 weeks ago         HPI: Petra Medina 17 y.o. female with anxiety in abnormal uterine bleeding presents to the hospital with a chief complaint of generalized weakness.  Associated symptoms include fatigue, intermittent SOB, dizziness and syncope.  Patient was seen at her PCM due to associated symptoms where they willie labs that showed H/H 5/18, patient was informed and recommended to report to ER.  Patient states has been ongoing about 1.5 weeks.  Patient was with history of dysfunctional uterine bleeding states I have been constantly bleeding since November.  Multiple iron and blood transfusion history with most recent 2 weeks ago.  Patient states she is been going through about a pad per hour (6-8 in an 8 hour period).  Pt did have syncopal episode on Friday during which patient was walking and fell and hit her nose.  She reports she was only unconscious for approximately 1 minute because she knew what time she walked outside.  She reports she is throbbing headache that are consistent with a history of anemia.  No visual disturbance, weakness, paresthesias, nausea vomiting.  Patient denied any other attempted self-treatment no other alleviating or  "exacerbating factors noted.  Patient follows with Michael Mueller III, MD OBGYN.     In the ED patient was afebrile without leukocytosis, H/H 5.2/18.6, microcytic hypochromic anemia noted, iron less than 10 TIBC 518, ferritin less than 1, potassium 3.0, alkaline phosphatase 48, ALT less than 5, pregnancy test negative, O positive indirect Hawa negative, UA cloudy red urine pH greater than 8, 3+ protein 1+ ketone, elevated urobilinogen, +3 leukocytes, few bacteria, 80 WBCs, RBCs >100.  Transvaginal ultrasound showed "Abnormal heterogenous thickened appearance of the endometrium measuring 35 mm.  Potential blood products and/or clot seen within the endometrial cavity. Posterior uterine fibroid. Free fluid seen in the pelvis which is greater than normal expected physiologic volume" patient was placed in observation for further workup and management of symptomatic anemia      Past Medical History:   Diagnosis Date    ADD (attention deficit disorder with hyperactivity)     Anemia, unspecified        Past Surgical History:   Procedure Laterality Date    DIAGNOSTIC LAPAROSCOPY N/A 3/21/2023    Procedure: LAPAROSCOPY, DIAGNOSTIC;  Surgeon: Katja Dang MD;  Location: [facility];  Service: OB/GYN;  Laterality: N/A;    HYSTEROSCOPIC RESECTION OF MYOMA N/A 3/21/2023    Procedure: MYOMECTOMY, HYSTEROSCOPIC;  Surgeon: Katja Dang MD;  Location: [facility];  Service: OB/GYN;  Laterality: N/A;       Review of patient's allergies indicates:   Allergen Reactions    Cherries      unknown    Lavender      unknown       No current facility-administered medications on file prior to encounter.     Current Outpatient Medications on File Prior to Encounter   Medication Sig    ferrous sulfate (FEOSOL) 325 mg (65 mg iron) Tab tablet Take 325 mg by mouth daily with breakfast.    ondansetron (ZOFRAN) 8 MG tablet Take 8 mg by mouth 2 (two) times daily.    relugolix-estradiol-norethindr (MYFEMBREE) 40-1-0.5 mg Tab Take 1 tablet " by mouth once daily.    ibuprofen (ADVIL,MOTRIN) 600 MG tablet Take 1 tablet (600 mg total) by mouth every 6 (six) hours as needed for Pain.    [DISCONTINUED] acetaminophen (TYLENOL) 650 MG TbSR Take 1 tablet (650 mg total) by mouth every 6 to 8 hours as needed (Pain).    [DISCONTINUED] etonogestreL-ethinyl estradioL (NUVARING) 0.12-0.015 mg/24 hr vaginal ring Place 1 each vaginally every 28 days.    [DISCONTINUED] prenatal vit-iron fum-folic ac 27 mg iron- 0.8 mg Tab Take 1 tablet by mouth.    [DISCONTINUED] SENNA 8.6 mg tablet Take 1 tablet by mouth once daily.     Family History    None       Tobacco Use    Smoking status: Never    Smokeless tobacco: Never    Tobacco comments:     VAPING   Substance and Sexual Activity    Alcohol use: No     Comment: SOCIAL    Drug use: No    Sexual activity: Yes     Partners: Male     Birth control/protection: Injection     Review of Systems   Constitutional:  Positive for fatigue. Negative for chills and fever.   HENT:  Negative for congestion and rhinorrhea.    Eyes:  Negative for photophobia and visual disturbance.   Respiratory:  Negative for cough and shortness of breath.    Cardiovascular:  Negative for chest pain, palpitations and leg swelling.   Gastrointestinal:  Positive for abdominal pain. Negative for diarrhea, nausea and vomiting.   Genitourinary:  Positive for vaginal bleeding. Negative for dysuria, frequency and urgency.   Skin:  Negative for pallor, rash and wound.   Neurological:  Positive for dizziness, syncope (Near-syncope) and light-headedness. Negative for headaches.   Psychiatric/Behavioral:  Negative for confusion and decreased concentration.    Objective:     Vital Signs (Most Recent):  Temp: 98.8 °F (37.1 °C) (07/11/23 2202)  Pulse: 97 (07/11/23 2232)  Resp: 20 (07/11/23 2232)  BP: (!) 103/58 (07/11/23 2232)  SpO2: 98 % (07/11/23 2232) Vital Signs (24h Range):  Temp:  [98.4 °F (36.9 °C)-98.8 °F (37.1 °C)] 98.8 °F (37.1 °C)  Pulse:  []  97  Resp:  [18-28] 20  SpO2:  [94 %-100 %] 98 %  BP: ()/(54-89) 103/58     Weight: 51.7 kg (114 lb)  Body mass index is 20.19 kg/m².     Physical Exam  Vitals and nursing note reviewed.   Constitutional:       General: She is not in acute distress.     Appearance: She is well-developed.   HENT:      Head: Normocephalic and atraumatic.      Right Ear: External ear normal.      Left Ear: External ear normal.      Nose: Nose normal.   Eyes:      Conjunctiva/sclera: Conjunctivae normal.      Pupils: Pupils are equal, round, and reactive to light.   Cardiovascular:      Rate and Rhythm: Normal rate and regular rhythm.   Pulmonary:      Effort: Pulmonary effort is normal. No respiratory distress.      Breath sounds: Normal breath sounds. No wheezing.   Abdominal:      General: Bowel sounds are normal. There is no distension.      Palpations: Abdomen is soft.      Tenderness: There is no abdominal tenderness.      Comments: No palpable hepatomegaly or splenomegaly    Musculoskeletal:         General: No tenderness. Normal range of motion.      Cervical back: Normal range of motion and neck supple.   Skin:     General: Skin is warm and dry.      Coloration: Skin is pale.   Neurological:      Mental Status: She is alert and oriented to person, place, and time.   Psychiatric:         Thought Content: Thought content normal.            CRANIAL NERVES     CN III, IV, VI   Pupils are equal, round, and reactive to light.     Significant Labs: All pertinent labs within the past 24 hours have been reviewed.  Recent Lab Results  (Last 5 results in the past 24 hours)        07/11/23  1950   07/11/23  1830   07/11/23  1819   07/11/23  1740   07/11/23  1322        Unit Blood Type Code     5100  [P]                5100  [P]                5100           Unit Expiration     [id number]  [P]                [id number]  [P]                [id number]           Unit Blood Type     O POS  [P]                O POS  [P]                 O POS           Albumin     3.9           Alkaline Phosphatase     48           ALT     <5           Anion Gap     9           Aniso         Slight       Appearance, UA   Cloudy             AST     13           Bacteria, UA   Few             Baso #     0.04     0.03       Basophil %     0.6     0.7       Bilirubin (UA)   3+  Comment: Positive urine bilirubin is not confirmed. Correlate with   serum bilirubin and clinical presentation.               BILIRUBIN TOTAL     0.2  Comment: For infants and newborns, interpretation of results should be based  on gestational age, weight and in agreement with clinical  observations.    Premature Infant recommended reference ranges:  Up to 24 hours.............<8.0 mg/dL  Up to 48 hours............<12.0 mg/dL  3-5 days..................<15.0 mg/dL  6-29 days.................<15.0 mg/dL             BUN     7           Calcium     9.1           Chloride     105           CO2     21           CODING SYSTEM     NKRY369  [P]                SVBU180  [P]                [id number]           Color, UA   Red             Creatinine     0.7           Crossmatch Interpretation     Compatible  [P]                Compatible  [P]                Compatible           Differential Method     Automated     Automated       DISPENSE STATUS     ISSUED  [P]                CROSSMATCHED  [P]                RETURNED           eGFR     >60           Eos #     0.1     0.1       Eosinophil %     1.3     1.7       Ferritin         <1       Giant Platelets         Present       Glucose     131           Glucose, UA   Trace             Gran # (ANC)     3.7     2.5       Gran %     59.2     62.7       Group & Rh     O POS           Hematocrit     18.6  Comment: HGB HCT critical result(s) called and verbal readback obtained from   Mey Graham 7/11/23 @1800 by CHRISTY 07/11/2023 19:01       18.0  Comment: HGB & HCT critical result(s) called and verbal readback obtained from   JOHN HART MA. by TRSTEPHANIE 07/11/2023  13:42         Hemoglobin     5.2  Comment: HGB HCT critical result(s) called and verbal readback obtained from   Mey Graham 7/11/23 @1800 by JN1 07/11/2023 19:01       5.2  Comment: HGB & HCT critical result(s) called and verbal readback obtained from   JOHN HART MA. by TRC1 07/11/2023 13:42         Hyaline Casts, UA   0             Hypo         Moderate       Immature Grans (Abs)     0.01  Comment: Mild elevation in immature granulocytes is non specific and   can be seen in a variety of conditions including stress response,   acute inflammation, trauma and pregnancy. Correlation with other   laboratory and clinical findings is essential.       0.00  Comment: Mild elevation in immature granulocytes is non specific and   can be seen in a variety of conditions including stress response,   acute inflammation, trauma and pregnancy. Correlation with other   laboratory and clinical findings is essential.         Immature Granulocytes     0.2     0.0       INDIRECT TR     NEG           INR     1.0  Comment: Coumadin Therapy:  2.0 - 3.0 for INR for all indicators except mechanical heart valves  and antiphospholipid syndromes which should use 2.5 - 3.5.             Iron         <10       Ketones, UA   1+             Leukocytes, UA   3+             Lipase     18           Lymph #     1.8     1.1       Lymph %     29.1     26.4       MCH     20.4     21.1       MCHC     28.0     28.9       MCV     73     73       Microscopic Comment   SEE COMMENT  Comment: Other formed elements not mentioned in the report are not   present in the microscopic examination.                Mono #     0.6     0.3       Mono %     9.6     8.5       MPV     12.7     12.5       NITRITE UA   Negative             nRBC     0     0       Occult Blood UA   3+             Ovalocytes         Occasional       pH, UA   >8.0             Platelet Estimate         Appears normal       Platelets     337     276       POC Anion Gap 16                POC BUN 5               POC Chloride 108               POC Creatinine 0.5               POC Glucose 92               POC Hematocrit 19               POC Ionized Calcium 1.18               Potassium, Blood Gas 3.3               Sodium, Blood Gas 139               POC TCO2 (MEASURED) 19               Poikilocytosis         Slight       Potassium     3.0           Preg Test, Ur       Negative         Product Code     T0013F30  [P]                [id number]  [P]                [id number]           PROTEIN TOTAL     6.7           Protein, UA   3+  Comment: Recommend a 24 hour urine protein or a urine   protein/creatinine ratio if globulin induced proteinuria is  clinically suspected.               Protime     10.9            Acceptable       Yes         RBC     2.55     2.46       RBC, UA   >100             RDW     19.6     18.7       Sample VENOUS               Saturated Iron         Unable to calculate       Sodium     135           Specific Copen, UA   1.010             Specimen Outdate     07/14/2023 23:59           Specimen UA   Urine, Clean Catch             Spherocytes         Occasional       TIBC         518       Transferrin         350       TSH     2.650           UNIT NUMBER     [id number]  [P]                [id number]  [P]                [id number]           UROBILINOGEN UA   2.0-3.0             WBC, UA   80             WBC     6.23     4.01                               [P] - Preliminary Result               Significant Imaging: I have reviewed all pertinent imaging results/findings within the past 24 hours.    Assessment/Plan:     * Symptomatic anemia  Presents to the hospital due to intermittent SOB, dizziness, fatigue, near syncopal episode for the last 1.5 weeks, recent labs were drawn patient was recommended to go to the ED due to H&H 5.2/18.6  Chronic, due to blood loss from dysfunctional uterine bleeding  Transfuse 2 units PRBC  Check CBC post transfusion, transfuse for <7  hemoglobin    UTI (urinary tract infection)  Patient presented with UA showing cloudy urine with elevated pH, +3 leukocytes, 80 WBCs and few bacteria.    IV Rocephin    Hypokalemia  Initially repleted  Tele monitoring  A.m. BMP and magnesium replete further as necessary    Abnormal uterine bleeding (AUB)  Patient states ongoing since 11/2022, Patient follows with Michael Mueller III, MD OBGYN.  Consult to OBGYN    Anxiety  Chronic, stable, patient denies any current antianxiety medication, continue to monitor        VTE Risk Mitigation (From admission, onward)         Ordered     IP VTE LOW RISK PATIENT  Once         07/11/23 2145     Place sequential compression device  Until discontinued         07/11/23 2145                     On 07/11/2023, patient should be placed in hospital observation services under my care in collaboration with Arpan Baker MD.      Livan Gómez NP  Department of Hospital Medicine  [facility] - Emergency Dept

## 2023-07-12 NOTE — PLAN OF CARE
07/12/23 1410   Discharge Planning   Assessment Type Discharge Planning Brief Assessment   Resource/Environmental Concerns none   Support Systems Parent   Equipment Currently Used at Home none   Current Living Arrangements home   Patient/Family Anticipates Transition to home   Patient/Family Anticipated Services at Transition none   DME Needed Upon Discharge  none   Discharge Plan A Home with family       [id number] - [place] - [address]  [address] [place] [address]  Phone: [phone] Fax: [phone]    [facility]  [place] - [address]  [address]  [place] [address]  Phone: [phone] Fax: [phone]

## 2023-07-12 NOTE — CONSULTS
Inpatient Radiology Pre-procedure Note    History of Present Illness:  Petra Medina is a 17 y.o. 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    A new inpatient IR consult received for US and fluoroscopic-guided UAE.    Admission H&P reviewed.  Past Medical History:   Diagnosis Date    ADD (attention deficit disorder with hyperactivity)     Anemia, unspecified      Past Surgical History:   Procedure Laterality Date    DIAGNOSTIC LAPAROSCOPY N/A 3/21/2023    Procedure: LAPAROSCOPY, DIAGNOSTIC;  Surgeon: Katja Dang MD;  Location: [facility];  Service: OB/GYN;  Laterality: N/A;    HYSTEROSCOPIC RESECTION OF MYOMA N/A 3/21/2023    Procedure: MYOMECTOMY, HYSTEROSCOPIC;  Surgeon: Katja Dang MD;  Location: [facility];  Service: OB/GYN;  Laterality: N/A;     Review of Systems:   As documented in primary team H&P    Home Meds:   Prior to Admission medications    Medication Sig Start Date End Date Taking? Authorizing Provider   ferrous sulfate (FEOSOL) 325 mg (65 mg iron) Tab tablet Take 325 mg by mouth daily with breakfast.   Yes Historical Provider   ondansetron (ZOFRAN) 8 MG tablet Take 8 mg by mouth 2 (two) times daily.   Yes Historical Provider   relugolix-estradiol-norethindr (MYFEMBREE) 40-1-0.5 mg Tab Take 1 tablet by mouth once daily. 6/22/23 6/21/24 Yes Katja Dang MD   ibuprofen (ADVIL,MOTRIN) 600 MG tablet Take 1 tablet (600 mg total) by mouth every 6 (six) hours as needed for Pain. 5/15/23   Katja Dang MD     Scheduled Meds:    cefTRIAXone (ROCEPHIN) IVPB  1 g Intravenous Q24H    ferrous  sulfate  1 tablet Oral BID    polyethylene glycol  17 g Oral Daily     Continuous Infusions:   PRN Meds:sodium chloride, acetaminophen, aluminum-magnesium hydroxide-simethicone, dextrose 10%, dextrose 10%, ketorolac, melatonin, naloxone, ondansetron, prochlorperazine    Anticoagulants/Antiplatelets: no anticoagulation    Allergies:   Review of patient's allergies indicates:   Allergen Reactions    Cherries      unknown    Lavender      unknown     Sedation Hx: have not been any systemic reactions    Labs:  Recent Labs   Lab 07/11/23 1819   INR 1.0       Recent Labs   Lab 07/12/23 0629   WBC 6.74   HGB 7.4*   HCT 24.1*   MCV 78*         Recent Labs   Lab 07/11/23 1819 07/12/23 0629   * 119*   * 141   K 3.0* 4.0    112*   CO2 21* 23   BUN 7 5*   CREATININE 0.7 0.6   CALCIUM 9.1 7.9*   MG  --  1.8   ALT <5*  --    AST 13  --    ALBUMIN 3.9  --    BILITOT 0.2  --      Vitals:  Temp: 98.6 °F (37 °C) (07/12/23 0423)  Pulse: 78 (07/12/23 1100)  Resp: 20 (07/12/23 1100)  BP: 125/72 (07/12/23 1100)  SpO2: 96 % (07/12/23 1100)     Physical Exam:  ASA: I  Mallampati: I    General: no acute distress  Mental Status: alert and oriented to person, place and time  HEENT: normocephalic, atraumatic  Chest: unlabored breathing  Heart: regular heart rate  Abdomen: nondistended  Extremity: moves all extremities    A/P:  17 y.o. 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    AUB 2/2 fibroids complicated by chronic recurrent severe symptomatic anemia - Personally had discussion regarding pt's diagnosis, potential risk of  recurrent episodes, potential treatments (ie continued medical management, transfusions PRN, UAE and hysterectomy). After long discussion with pt and her mother, they wish to defer any/all surgical and interventional procedures given the potential risk for infertility unless pt's condition becomes life-threatening and emergent. They wish to proceed with medical management including Fe++/PRBC transfusions PRN and allow some time for newly Rx medication (MYFEMBREE) to be allowed to potentially resolve pt's medical issue. Pt and mother both advised that, given her age, medical management with transfusions and MYFEMBREE as well as UAE procedure are only temporizing and do not eliminate the potential for recurrent severe or even life-threatening events. Pt and her mother verbalize understanding and wish to continue with medical management for now.    Thank you for considering IR for the care of your patient.     Pranay Morris MD  Interventional Radiology

## 2023-07-12 NOTE — HPI
"Petra Medina 17 y.o. female with anxiety in abnormal uterine bleeding presents to the hospital with a chief complaint of generalized weakness.  Associated symptoms include fatigue, intermittent SOB, dizziness and syncope.  Patient was seen at her PCM due to associated symptoms where they willie labs that showed H/H 5/18, patient was informed and recommended to report to ER.  Patient states has been ongoing about 1.5 weeks.  Patient was with history of dysfunctional uterine bleeding states I have been constantly bleeding since November.  Multiple iron and blood transfusion history with most recent 2 weeks ago.  Patient states she is been going through about a pad per hour (6-8 in an 8 hour period).  Pt did have syncopal episode on Friday during which patient was walking and fell and hit her nose.  She reports she was only unconscious for approximately 1 minute because she knew what time she walked outside.  She reports she is throbbing headache that are consistent with a history of anemia.  No visual disturbance, weakness, paresthesias, nausea vomiting.  Patient denied any other attempted self-treatment no other alleviating or exacerbating factors noted.  Patient follows with Michael Mueller III, MD OBGYN.     In the ED patient was afebrile without leukocytosis, H/H 5.2/18.6, microcytic hypochromic anemia noted, iron less than 10 TIBC 518, ferritin less than 1, potassium 3.0, alkaline phosphatase 48, ALT less than 5, pregnancy test negative, O positive indirect Hawa negative, UA cloudy red urine pH greater than 8, 3+ protein 1+ ketone, elevated urobilinogen, +3 leukocytes, few bacteria, 80 WBCs, RBCs >100.  Transvaginal ultrasound showed "Abnormal heterogenous thickened appearance of the endometrium measuring 35 mm.  Potential blood products and/or clot seen within the endometrial cavity. Posterior uterine fibroid. Free fluid seen in the pelvis which is greater than normal expected physiologic " "volume" patient was placed in observation for further workup and management of symptomatic anemia  "

## 2023-07-12 NOTE — ASSESSMENT & PLAN NOTE
Patient states ongoing since 11/2022, Patient follows with Michael Mueller III, MD OBGYN.  Consult to OBGYN

## 2023-07-12 NOTE — ASSESSMENT & PLAN NOTE
Presents to the hospital due to intermittent SOB, dizziness, fatigue, near syncopal episode for the last 1.5 weeks, recent labs were drawn patient was recommended to go to the ED due to H&H 5.2/18.6  Chronic, due to blood loss from dysfunctional uterine bleeding  Transfused 2 units PRBC in ED with hgb now 7.4. Pt continues to have bleeding and it has been going on for over a year. She is followed by gyn as outpatient and hematology for transfusions of prbc and iron infusions    -gyn consulted  -repeat cbc in am to monitor for sustained hgb stability

## 2023-07-12 NOTE — ASSESSMENT & PLAN NOTE
Patient states ongoing since 11/2022, Patient follows with gyn as outpatient with previous intervention that failed.  Consult to OBGYN

## 2023-07-12 NOTE — NURSING
[facility]  Nurses Note -- 4 Eyes      7/12/2023       Skin assessed on: Admit      [x] No Pressure Injuries Present    [x]Prevention Measures Documented    [] Yes LDA  for Pressure Injury Previously documented     [] Yes New Pressure Injury Discovered   [] LDA for New Pressure Injury Added      Attending RN:  STEFF MAC RN     Second RN:  Georgie NICLOE

## 2023-07-12 NOTE — ASSESSMENT & PLAN NOTE
Initially repleted  Tele monitoring  A.m. BMP and magnesium replete further as necessary  -resolved

## 2023-07-12 NOTE — ASSESSMENT & PLAN NOTE
Presents to the hospital due to intermittent SOB, dizziness, fatigue, near syncopal episode for the last 1.5 weeks, recent labs were drawn patient was recommended to go to the ED due to H&H 5.2/18.6  Chronic, due to blood loss from dysfunctional uterine bleeding  Transfuse 2 units PRBC  Check CBC post transfusion, transfuse for <7 hemoglobin

## 2023-07-12 NOTE — PROGRESS NOTES
[facility] [place] [facility]  [facility] Medicine  Progress Note    Patient Name: Petra Medina  MRN: [id number]  Patient Class: OP- Observation   Admission Date: 7/11/2023  Length of Stay: 0 days  Attending Physician: Quintin Smith III, MD  Primary Care Provider: Kay Urban MD        Subjective:     Principal Problem:Symptomatic anemia        HPI:  Petra Medina 17 y.o. female with anxiety in abnormal uterine bleeding presents to the hospital with a chief complaint of generalized weakness.  Associated symptoms include fatigue, intermittent SOB, dizziness and syncope.  Patient was seen at her PCM due to associated symptoms where they willie labs that showed H/H 5/18, patient was informed and recommended to report to ER.  Patient states has been ongoing about 1.5 weeks.  Patient was with history of dysfunctional uterine bleeding states I have been constantly bleeding since November.  Multiple iron and blood transfusion history with most recent 2 weeks ago.  Patient states she is been going through about a pad per hour (6-8 in an 8 hour period).  Pt did have syncopal episode on Friday during which patient was walking and fell and hit her nose.  She reports she was only unconscious for approximately 1 minute because she knew what time she walked outside.  She reports she is throbbing headache that are consistent with a history of anemia.  No visual disturbance, weakness, paresthesias, nausea vomiting.  Patient denied any other attempted self-treatment no other alleviating or exacerbating factors noted.  Patient follows with Michael Mueller III, MD OBGYN.     In the ED patient was afebrile without leukocytosis, H/H 5.2/18.6, microcytic hypochromic anemia noted, iron less than 10 TIBC 518, ferritin less than 1, potassium 3.0, alkaline phosphatase 48, ALT less than 5, pregnancy test negative, O positive indirect [place] negative, UA cloudy red urine pH greater than 8, 3+ protein 1+ ketone,  "elevated urobilinogen, +3 leukocytes, few bacteria, 80 WBCs, RBCs >100.  Transvaginal ultrasound showed "Abnormal heterogenous thickened appearance of the endometrium measuring 35 mm.  Potential blood products and/or clot seen within the endometrial cavity. Posterior uterine fibroid. Free fluid seen in the pelvis which is greater than normal expected physiologic volume" patient was placed in observation for further workup and management of symptomatic anemia      Overview/Hospital Course:  No notes on file    Interval History: Pt continues to have some lower abd pain that she states is chronic and has been present for the past year. No worsening of sxs. No n/v/d. Awaiting gyn evaluation.    Review of Systems  Objective:     Vital Signs (Most Recent):  Temp: 98.6 °F (37 °C) (07/12/23 0423)  Pulse: 93 (07/12/23 0815)  Resp: 16 (07/12/23 0423)  BP: (!) 93/54 (07/12/23 0815)  SpO2: 96 % (07/12/23 0815) Vital Signs (24h Range):  Temp:  [98.4 °F (36.9 °C)-98.8 °F (37.1 °C)] 98.6 °F (37 °C)  Pulse:  [] 93  Resp:  [16-28] 16  SpO2:  [94 %-100 %] 96 %  BP: ()/(51-89) 93/54     Weight: 51.7 kg (114 lb)  Body mass index is 20.19 kg/m².    Intake/Output Summary (Last 24 hours) at 7/12/2023 1206  Last data filed at 7/12/2023 0424  Gross per 24 hour   Intake 1267.5 ml   Output --   Net 1267.5 ml         Physical Exam  Vitals reviewed.   Constitutional:       General: She is not in acute distress.     Appearance: She is not toxic-appearing.   HENT:      Head: Normocephalic and atraumatic.      Mouth/Throat:      Mouth: Mucous membranes are moist.      Pharynx: Oropharynx is clear.   Eyes:      General: No scleral icterus.     Extraocular Movements: Extraocular movements intact.   Abdominal:      General: There is no distension.      Palpations: Abdomen is soft.      Tenderness: There is abdominal tenderness (lower abd). There is no guarding or rebound.   Neurological:      General: No focal deficit present.      Mental " Status: She is alert and oriented to person, place, and time.   Psychiatric:         Mood and Affect: Mood normal.         Behavior: Behavior normal.           Significant Labs: All pertinent labs within the past 24 hours have been reviewed.    Significant Imaging: I have reviewed all pertinent imaging results/findings within the past 24 hours.      Assessment/Plan:      * Symptomatic anemia  Presents to the hospital due to intermittent SOB, dizziness, fatigue, near syncopal episode for the last 1.5 weeks, recent labs were drawn patient was recommended to go to the ED due to H&H 5.2/18.6  Chronic, due to blood loss from dysfunctional uterine bleeding  Transfused 2 units PRBC in ED with hgb now 7.4. BP remains soft, but stable. Pt continues to have bleeding and it has been going on for over a year. She is followed by gyn as outpatient and hematology for transfusions of prbc and iron infusions    -gyn consulted  -repeat cbc in am to monitor for sustained hgb stability    UTI (urinary tract infection)  Patient presented with UA showing cloudy urine with elevated pH, +3 leukocytes, 80 WBCs and few bacteria.    IV Rocephin    Hypokalemia  Initially repleted  Tele monitoring  A.m. BMP and magnesium replete further as necessary  -resolved    Abnormal uterine bleeding (AUB)  Patient states ongoing since 11/2022, Patient follows with gyn as outpatient with previous intervention that failed.  Consult to OBGYN    Anxiety  Chronic, stable, patient denies any current antianxiety medication, continue to monitor        VTE Risk Mitigation (From admission, onward)           Ordered     IP VTE LOW RISK PATIENT  Once         07/11/23 2145     Place sequential compression device  Until discontinued         07/11/23 2145                    Discharge Planning   DESEAN:      Code Status: Full Code   Is the patient medically ready for discharge?:     Reason for patient still in hospital (select all that apply): Treatment and Consult  recommendations                     Quintin Smith III, MD  Department of Hospital Medicine   [facility] - Emergency Dept

## 2023-07-12 NOTE — NURSING
Received patient from ER to room via  bed Patient accompanied by transport and family. Transferred patient to bed. Evaluated general patient appearance and condition. Admit assessment initiated. Tele monitoring initiated. Noted 20 G to RAC and 18 G to LAC.  No apparent distress noted at this time.

## 2023-07-12 NOTE — ED TRIAGE NOTES
Pt to ED with c/o abnormal labs. Pt states lab results today indicate low H/H and was prompted to ED. Pt reports dizziness, lightheadedness, SOB, and generalized fatigue. Pt states syncopal episode on Fri. Hx uterine fibroids, fibroids removed one month ago. Pt reports soaking 6-8 pads within 8 hours. Pt has transfusion 2 weeks ago. Pt is AAOx4, NAD, VSS, breathing even and unlabored.

## 2023-07-12 NOTE — SUBJECTIVE & OBJECTIVE
Past Medical History:   Diagnosis Date    ADD (attention deficit disorder with hyperactivity)     Anemia, unspecified        Past Surgical History:   Procedure Laterality Date    DIAGNOSTIC LAPAROSCOPY N/A 3/21/2023    Procedure: LAPAROSCOPY, DIAGNOSTIC;  Surgeon: Katja Dang MD;  Location: [facility];  Service: OB/GYN;  Laterality: N/A;    HYSTEROSCOPIC RESECTION OF MYOMA N/A 3/21/2023    Procedure: MYOMECTOMY, HYSTEROSCOPIC;  Surgeon: Katja Dang MD;  Location: [facility];  Service: OB/GYN;  Laterality: N/A;       Review of patient's allergies indicates:   Allergen Reactions    Cherries      unknown    Lavender      unknown       No current facility-administered medications on file prior to encounter.     Current Outpatient Medications on File Prior to Encounter   Medication Sig    ferrous sulfate (FEOSOL) 325 mg (65 mg iron) Tab tablet Take 325 mg by mouth daily with breakfast.    ondansetron (ZOFRAN) 8 MG tablet Take 8 mg by mouth 2 (two) times daily.    relugolix-estradiol-norethindr (MYFEMBREE) 40-1-0.5 mg Tab Take 1 tablet by mouth once daily.    ibuprofen (ADVIL,MOTRIN) 600 MG tablet Take 1 tablet (600 mg total) by mouth every 6 (six) hours as needed for Pain.    [DISCONTINUED] acetaminophen (TYLENOL) 650 MG TbSR Take 1 tablet (650 mg total) by mouth every 6 to 8 hours as needed (Pain).    [DISCONTINUED] etonogestreL-ethinyl estradioL (NUVARING) 0.12-0.015 mg/24 hr vaginal ring Place 1 each vaginally every 28 days.    [DISCONTINUED] prenatal vit-iron fum-folic ac 27 mg iron- 0.8 mg Tab Take 1 tablet by mouth.    [DISCONTINUED] SENNA 8.6 mg tablet Take 1 tablet by mouth once daily.     Family History    None       Tobacco Use    Smoking status: Never    Smokeless tobacco: Never    Tobacco comments:     VAPING   Substance and Sexual Activity    Alcohol use: No     Comment: SOCIAL    Drug use: No    Sexual activity: Yes     Partners: Male     Birth control/protection: Injection     Review of Systems    Constitutional:  Positive for fatigue. Negative for chills and fever.   HENT:  Negative for congestion and rhinorrhea.    Eyes:  Negative for photophobia and visual disturbance.   Respiratory:  Negative for cough and shortness of breath.    Cardiovascular:  Negative for chest pain, palpitations and leg swelling.   Gastrointestinal:  Positive for abdominal pain. Negative for diarrhea, nausea and vomiting.   Genitourinary:  Positive for vaginal bleeding. Negative for dysuria, frequency and urgency.   Skin:  Negative for pallor, rash and wound.   Neurological:  Positive for dizziness, syncope (Near-syncope) and light-headedness. Negative for headaches.   Psychiatric/Behavioral:  Negative for confusion and decreased concentration.    Objective:     Vital Signs (Most Recent):  Temp: 98.8 °F (37.1 °C) (07/11/23 2202)  Pulse: 97 (07/11/23 2232)  Resp: 20 (07/11/23 2232)  BP: (!) 103/58 (07/11/23 2232)  SpO2: 98 % (07/11/23 2232) Vital Signs (24h Range):  Temp:  [98.4 °F (36.9 °C)-98.8 °F (37.1 °C)] 98.8 °F (37.1 °C)  Pulse:  [] 97  Resp:  [18-28] 20  SpO2:  [94 %-100 %] 98 %  BP: ()/(54-89) 103/58     Weight: 51.7 kg (114 lb)  Body mass index is 20.19 kg/m².     Physical Exam  Vitals and nursing note reviewed.   Constitutional:       General: She is not in acute distress.     Appearance: She is well-developed.   HENT:      Head: Normocephalic and atraumatic.      Right Ear: External ear normal.      Left Ear: External ear normal.      Nose: Nose normal.   Eyes:      Conjunctiva/sclera: Conjunctivae normal.      Pupils: Pupils are equal, round, and reactive to light.   Cardiovascular:      Rate and Rhythm: Normal rate and regular rhythm.   Pulmonary:      Effort: Pulmonary effort is normal. No respiratory distress.      Breath sounds: Normal breath sounds. No wheezing.   Abdominal:      General: Bowel sounds are normal. There is no distension.      Palpations: Abdomen is soft.      Tenderness: There is no  abdominal tenderness.      Comments: No palpable hepatomegaly or splenomegaly    Musculoskeletal:         General: No tenderness. Normal range of motion.      Cervical back: Normal range of motion and neck supple.   Skin:     General: Skin is warm and dry.      Coloration: Skin is pale.   Neurological:      Mental Status: She is alert and oriented to person, place, and time.   Psychiatric:         Thought Content: Thought content normal.            CRANIAL NERVES     CN III, IV, VI   Pupils are equal, round, and reactive to light.     Significant Labs: All pertinent labs within the past 24 hours have been reviewed.  Recent Lab Results  (Last 5 results in the past 24 hours)        07/11/23  1950   07/11/23  1830   07/11/23  1819   07/11/23  1740   07/11/23  1322        Unit Blood Type Code     5100  [P]                5100  [P]                5100           Unit Expiration     [id number]  [P]                [id number]  [P]                [id number]           Unit Blood Type     O POS  [P]                O POS  [P]                O POS           Albumin     3.9           Alkaline Phosphatase     48           ALT     <5           Anion Gap     9           Aniso         Slight       Appearance, UA   Cloudy             AST     13           Bacteria, UA   Few             Baso #     0.04     0.03       Basophil %     0.6     0.7       Bilirubin (UA)   3+  Comment: Positive urine bilirubin is not confirmed. Correlate with   serum bilirubin and clinical presentation.               BILIRUBIN TOTAL     0.2  Comment: For infants and newborns, interpretation of results should be based  on gestational age, weight and in agreement with clinical  observations.    Premature Infant recommended reference ranges:  Up to 24 hours.............<8.0 mg/dL  Up to 48 hours............<12.0 mg/dL  3-5 days..................<15.0 mg/dL  6-29 days.................<15.0 mg/dL             BUN     7           Calcium     9.1            Chloride     105           CO2     21           CODING SYSTEM     [id number]  [P]                [id number]  [P]                [id number]           Color, UA   Red             Creatinine     0.7           Crossmatch Interpretation     Compatible  [P]                Compatible  [P]                Compatible           Differential Method     Automated     Automated       DISPENSE STATUS     ISSUED  [P]                CROSSMATCHED  [P]                RETURNED           eGFR     >60           Eos #     0.1     0.1       Eosinophil %     1.3     1.7       Ferritin         <1       Giant Platelets         Present       Glucose     131           Glucose, UA   Trace             Gran # (ANC)     3.7     2.5       Gran %     59.2     62.7       Group & Rh     O POS           Hematocrit     18.6  Comment: HGB HCT critical result(s) called and verbal readback obtained from   Mey Graham 7/11/23 @1800 by [facility] 07/11/2023 19:01       18.0  Comment: HGB & HCT critical result(s) called and verbal readback obtained from   JOHN HART MA. by [facility] 07/11/2023 13:42         Hemoglobin     5.2  Comment: HGB HCT critical result(s) called and verbal readback obtained from   Mey Graham 7/11/23 @1800 by [facility] 07/11/2023 19:01       5.2  Comment: HGB & HCT critical result(s) called and verbal readback obtained from   JOHN HART MA. by [facility] 07/11/2023 13:42         Hyaline Casts, UA   0             Hypo         Moderate       Immature Grans (Abs)     0.01  Comment: Mild elevation in immature granulocytes is non specific and   can be seen in a variety of conditions including stress response,   acute inflammation, trauma and pregnancy. Correlation with other   laboratory and clinical findings is essential.       0.00  Comment: Mild elevation in immature granulocytes is non specific and   can be seen in a variety of conditions including stress response,   acute inflammation, trauma and pregnancy. Correlation with other   laboratory and  clinical findings is essential.         Immature Granulocytes     0.2     0.0       INDIRECT TR     NEG           INR     1.0  Comment: Coumadin Therapy:  2.0 - 3.0 for INR for all indicators except mechanical heart valves  and antiphospholipid syndromes which should use 2.5 - 3.5.             Iron         <10       Ketones, UA   1+             Leukocytes, UA   3+             Lipase     18           Lymph #     1.8     1.1       Lymph %     29.1     26.4       MCH     20.4     21.1       MCHC     28.0     28.9       MCV     73     73       Microscopic Comment   SEE COMMENT  Comment: Other formed elements not mentioned in the report are not   present in the microscopic examination.                Mono #     0.6     0.3       Mono %     9.6     8.5       MPV     12.7     12.5       NITRITE UA   Negative             nRBC     0     0       Occult Blood UA   3+             Ovalocytes         Occasional       pH, UA   >8.0             Platelet Estimate         Appears normal       Platelets     337     276       POC Anion Gap 16               POC BUN 5               POC Chloride 108               POC Creatinine 0.5               POC Glucose 92               POC Hematocrit 19               POC Ionized Calcium 1.18               Potassium, Blood Gas 3.3               Sodium, Blood Gas 139               POC TCO2 (MEASURED) 19               Poikilocytosis         Slight       Potassium     3.0           Preg Test, Ur       Negative         Product Code     [id number]  [P]                [id number]  [P]                [id number]           PROTEIN TOTAL     6.7           Protein, UA   3+  Comment: Recommend a 24 hour urine protein or a urine   protein/creatinine ratio if globulin induced proteinuria is  clinically suspected.               Protime     10.9            Acceptable       Yes         RBC     2.55     2.46       RBC, UA   >100             RDW     19.6     18.7       Sample VENOUS               Saturated  Iron         Unable to calculate       Sodium     135           Specific [place], UA   1.010             Specimen Outdate     07/14/2023 23:59           Specimen UA   Urine, Clean Catch             Spherocytes         Occasional       TIBC         518       Transferrin         350       TSH     2.650           UNIT NUMBER     [id number]  [P]                [id number]  [P]                [id number]           UROBILINOGEN UA   2.0-3.0             WBC, UA   80             WBC     6.23     4.01                               [P] - Preliminary Result               Significant Imaging: I have reviewed all pertinent imaging results/findings within the past 24 hours.

## 2023-07-12 NOTE — ASSESSMENT & PLAN NOTE
Patient presented with UA showing cloudy urine with elevated pH, +3 leukocytes, 80 WBCs and few bacteria.    IV Rocephin

## 2023-07-13 ENCOUNTER — TELEPHONE (OUTPATIENT)
Dept: HEMATOLOGY/ONCOLOGY | Facility: CLINIC | Age: 18
End: 2023-07-13
Payer: COMMERCIAL

## 2023-07-13 ENCOUNTER — TELEPHONE (OUTPATIENT)
Dept: OBSTETRICS AND GYNECOLOGY | Facility: CLINIC | Age: 18
End: 2023-07-13

## 2023-07-13 LAB
ANION GAP SERPL CALC-SCNC: 7 MMOL/L (ref 8–16)
BACTERIA UR CULT: NORMAL
BASOPHILS # BLD AUTO: 0.03 K/UL (ref 0–0.2)
BASOPHILS NFR BLD: 0.7 % (ref 0–1.9)
BLD PROD TYP BPU: NORMAL
BLD PROD TYP BPU: NORMAL
BLOOD UNIT EXPIRATION DATE: NORMAL
BLOOD UNIT EXPIRATION DATE: NORMAL
BLOOD UNIT TYPE CODE: 5100
BLOOD UNIT TYPE CODE: 5100
BLOOD UNIT TYPE: NORMAL
BLOOD UNIT TYPE: NORMAL
BUN SERPL-MCNC: 5 MG/DL (ref 6–20)
CALCIUM SERPL-MCNC: 8.2 MG/DL (ref 8.7–10.5)
CHLORIDE SERPL-SCNC: 113 MMOL/L (ref 95–110)
CO2 SERPL-SCNC: 20 MMOL/L (ref 23–29)
CODING SYSTEM: NORMAL
CODING SYSTEM: NORMAL
CREAT SERPL-MCNC: 0.7 MG/DL (ref 0.5–1.4)
CROSSMATCH INTERPRETATION: NORMAL
CROSSMATCH INTERPRETATION: NORMAL
DIFFERENTIAL METHOD: ABNORMAL
DISPENSE STATUS: NORMAL
DISPENSE STATUS: NORMAL
EOSINOPHIL # BLD AUTO: 0.2 K/UL (ref 0–0.5)
EOSINOPHIL NFR BLD: 5.4 % (ref 0–8)
ERYTHROCYTE [DISTWIDTH] IN BLOOD BY AUTOMATED COUNT: 19.3 % (ref 11.5–14.5)
EST. GFR  (NO RACE VARIABLE): >60 ML/MIN/1.73 M^2
GLUCOSE SERPL-MCNC: 100 MG/DL (ref 70–110)
HCT VFR BLD AUTO: 23.1 % (ref 37–48.5)
HGB BLD-MCNC: 7 G/DL (ref 12–16)
IMM GRANULOCYTES # BLD AUTO: 0.01 K/UL (ref 0–0.04)
IMM GRANULOCYTES NFR BLD AUTO: 0.2 % (ref 0–0.5)
LYMPHOCYTES # BLD AUTO: 1.7 K/UL (ref 1–4.8)
LYMPHOCYTES NFR BLD: 40.5 % (ref 18–48)
MAGNESIUM SERPL-MCNC: 2 MG/DL (ref 1.6–2.6)
MCH RBC QN AUTO: 23.6 PG (ref 27–31)
MCHC RBC AUTO-ENTMCNC: 30.3 G/DL (ref 32–36)
MCV RBC AUTO: 78 FL (ref 82–98)
MONOCYTES # BLD AUTO: 0.7 K/UL (ref 0.3–1)
MONOCYTES NFR BLD: 15.5 % (ref 4–15)
NEUTROPHILS # BLD AUTO: 1.6 K/UL (ref 1.8–7.7)
NEUTROPHILS NFR BLD: 37.7 % (ref 38–73)
NRBC BLD-RTO: 0 /100 WBC
PLATELET # BLD AUTO: 246 K/UL (ref 150–450)
PMV BLD AUTO: ABNORMAL FL (ref 9.2–12.9)
POTASSIUM SERPL-SCNC: 3.6 MMOL/L (ref 3.5–5.1)
RBC # BLD AUTO: 2.97 M/UL (ref 4–5.4)
SODIUM SERPL-SCNC: 140 MMOL/L (ref 136–145)
TRANS ERYTHROCYTES VOL PATIENT: NORMAL ML
TRANS ERYTHROCYTES VOL PATIENT: NORMAL ML
WBC # BLD AUTO: 4.27 K/UL (ref 3.9–12.7)

## 2023-07-13 PROCEDURE — 96361 HYDRATE IV INFUSION ADD-ON: CPT

## 2023-07-13 PROCEDURE — 96365 THER/PROPH/DIAG IV INF INIT: CPT | Mod: 59

## 2023-07-13 PROCEDURE — 99223 1ST HOSP IP/OBS HIGH 75: CPT | Mod: ,,, | Performed by: STUDENT IN AN ORGANIZED HEALTH CARE EDUCATION/TRAINING PROGRAM

## 2023-07-13 PROCEDURE — 63600175 PHARM REV CODE 636 W HCPCS

## 2023-07-13 PROCEDURE — 63600175 PHARM REV CODE 636 W HCPCS: Performed by: STUDENT IN AN ORGANIZED HEALTH CARE EDUCATION/TRAINING PROGRAM

## 2023-07-13 PROCEDURE — 25000003 PHARM REV CODE 250

## 2023-07-13 PROCEDURE — 21400001 HC TELEMETRY ROOM

## 2023-07-13 PROCEDURE — 85025 COMPLETE CBC W/AUTO DIFF WBC: CPT

## 2023-07-13 PROCEDURE — 96366 THER/PROPH/DIAG IV INF ADDON: CPT

## 2023-07-13 PROCEDURE — 25000003 PHARM REV CODE 250: Performed by: STUDENT IN AN ORGANIZED HEALTH CARE EDUCATION/TRAINING PROGRAM

## 2023-07-13 PROCEDURE — 99223 PR INITIAL HOSPITAL CARE,LEVL III: ICD-10-PCS | Mod: ,,, | Performed by: STUDENT IN AN ORGANIZED HEALTH CARE EDUCATION/TRAINING PROGRAM

## 2023-07-13 PROCEDURE — 83735 ASSAY OF MAGNESIUM: CPT

## 2023-07-13 PROCEDURE — P9021 RED BLOOD CELLS UNIT: HCPCS | Performed by: STUDENT IN AN ORGANIZED HEALTH CARE EDUCATION/TRAINING PROGRAM

## 2023-07-13 PROCEDURE — 36415 COLL VENOUS BLD VENIPUNCTURE: CPT

## 2023-07-13 PROCEDURE — 80048 BASIC METABOLIC PNL TOTAL CA: CPT

## 2023-07-13 RX ORDER — HYDROCODONE BITARTRATE AND ACETAMINOPHEN 500; 5 MG/1; MG/1
TABLET ORAL
Status: DISCONTINUED | OUTPATIENT
Start: 2023-07-13 | End: 2023-07-14 | Stop reason: HOSPADM

## 2023-07-13 RX ADMIN — ACETAMINOPHEN 650 MG: 325 TABLET ORAL at 12:07

## 2023-07-13 RX ADMIN — CEFTRIAXONE 1 G: 1 INJECTION, POWDER, FOR SOLUTION INTRAMUSCULAR; INTRAVENOUS at 11:07

## 2023-07-13 RX ADMIN — IRON SUCROSE 200 MG: 20 INJECTION, SOLUTION INTRAVENOUS at 12:07

## 2023-07-13 RX ADMIN — FERROUS SULFATE TAB 325 MG (65 MG ELEMENTAL FE) 1 EACH: 325 (65 FE) TAB at 08:07

## 2023-07-13 RX ADMIN — ACETAMINOPHEN 650 MG: 325 TABLET ORAL at 11:07

## 2023-07-13 RX ADMIN — CEFTRIAXONE 1 G: 1 INJECTION, POWDER, FOR SOLUTION INTRAMUSCULAR; INTRAVENOUS at 12:07

## 2023-07-13 RX ADMIN — SODIUM CHLORIDE 1000 ML: 9 INJECTION, SOLUTION INTRAVENOUS at 04:07

## 2023-07-13 RX ADMIN — POLYETHYLENE GLYCOL 3350 17 G: 17 POWDER, FOR SOLUTION ORAL at 08:07

## 2023-07-13 NOTE — SUBJECTIVE & OBJECTIVE
Interval History: Pt with continued bleeding overnight and hypotension that did not improve with fluid bolus. 2 more units prbc ordered and transfusion. Pts mother requesting hematology eval as they sent her to ed. Pt still dizzy on standing. Discussed risk and benefits of [place] and family would like to defer at this time due to risk of infertility.     Review of Systems  Objective:     Vital Signs (Most Recent):  Temp: 98.6 °F (37 °C) (07/13/23 0939)  Pulse: 73 (07/13/23 0939)  Resp: 18 (07/13/23 0939)  BP: (!) 94/52 (07/13/23 0939)  SpO2: 99 % (07/13/23 0939) Vital Signs (24h Range):  Temp:  [98.2 °F (36.8 °C)-100.1 °F (37.8 °C)] 98.6 °F (37 °C)  Pulse:  [70-85] 73  Resp:  [18-20] 18  SpO2:  [96 %-100 %] 99 %  BP: ()/(38-72) 94/52     Weight: 51.7 kg (114 lb)  Body mass index is 20.19 kg/m².    Intake/Output Summary (Last 24 hours) at 7/13/2023 1036  Last data filed at 7/13/2023 0815  Gross per 24 hour   Intake 120 ml   Output --   Net 120 ml         Physical Exam  Vitals reviewed.   Constitutional:       General: She is not in acute distress.     Appearance: She is not toxic-appearing.   HENT:      Head: Normocephalic and atraumatic.      Mouth/Throat:      Mouth: Mucous membranes are moist.      Pharynx: Oropharynx is clear.   Eyes:      General: No scleral icterus.     Extraocular Movements: Extraocular movements intact.   Cardiovascular:      Rate and Rhythm: Normal rate and regular rhythm.   Pulmonary:      Effort: Pulmonary effort is normal. No respiratory distress.   Musculoskeletal:      Cervical back: Neck supple. No rigidity.   Neurological:      General: No focal deficit present.      Mental Status: She is alert and oriented to person, place, and time.   Psychiatric:         Mood and Affect: Mood normal.         Behavior: Behavior normal.           Significant Labs: All pertinent labs within the past 24 hours have been reviewed.    Significant Imaging: I have reviewed all pertinent imaging  results/findings within the past 24 hours.

## 2023-07-13 NOTE — TELEPHONE ENCOUNTER
----- Message from Destinee Nickerson sent at 7/12/2023 11:43 AM CDT -----  Please can you call her. Thank you.      ----- Message -----  From: Leticia Salgado MA  Sent: 7/12/2023   8:15 AM CDT  To: [facility] Scheduling    Please Advise, Thanks!   ----- Message -----  From: Katja Dang MD  Sent: 7/11/2023   7:32 PM CDT  To: [facility] Staff    Good evening,    I reached out about 2 weeks ago regarding getting this patient in to see Dr. Valdovinos. I know that Dr. Valdovinos has limited availability, but I don't think anyone has reached out to her about an appointment. She is currently getting transfused for the second time in 2 weeks, so if y'all are able to see her in the next few weeks, that would be great. I am happy to provided any other information if y'all need it!    Thanks,  Katja Dang

## 2023-07-13 NOTE — NURSING
LUE BP is 82/38 w a MAP of 52.  RUE BP is 94/50 w a MAP of 64.  Luis CAMPBELL aware.    Pt still feels tired from not getting much sleep over night, also some weakness w ambulation.  MD ordered another unit of blood, will educate pt.

## 2023-07-13 NOTE — PROGRESS NOTES
[facility] Medicine  Progress Note    Patient Name: Petra Medina  MRN: [id number]  Patient Class: OP- Observation   Admission Date: 7/11/2023  Length of Stay: 0 days  Attending Physician: Quintin Smith III, MD  Primary Care Provider: Kay Urban MD        Subjective:     Principal Problem:Symptomatic anemia        HPI:  Petra Medina 17 y.o. female with anxiety in abnormal uterine bleeding presents to the hospital with a chief complaint of generalized weakness.  Associated symptoms include fatigue, intermittent SOB, dizziness and syncope.  Patient was seen at her PCM due to associated symptoms where they willie labs that showed H/H 5/18, patient was informed and recommended to report to ER.  Patient states has been ongoing about 1.5 weeks.  Patient was with history of dysfunctional uterine bleeding states I have been constantly bleeding since November.  Multiple iron and blood transfusion history with most recent 2 weeks ago.  Patient states she is been going through about a pad per hour (6-8 in an 8 hour period).  Pt did have syncopal episode on Friday during which patient was walking and fell and hit her nose.  She reports she was only unconscious for approximately 1 minute because she knew what time she walked outside.  She reports she is throbbing headache that are consistent with a history of anemia.  No visual disturbance, weakness, paresthesias, nausea vomiting.  Patient denied any other attempted self-treatment no other alleviating or exacerbating factors noted.  Patient follows with Michael Mueller III, MD OBGYN.     In the ED patient was afebrile without leukocytosis, H/H 5.2/18.6, microcytic hypochromic anemia noted, iron less than 10 TIBC 518, ferritin less than 1, potassium 3.0, alkaline phosphatase 48, ALT less than 5, pregnancy test negative, O positive indirect Hawa negative, UA cloudy red urine pH greater than 8, 3+ protein 1+ ketone,  "elevated urobilinogen, +3 leukocytes, few bacteria, 80 WBCs, RBCs >100.  Transvaginal ultrasound showed "Abnormal heterogenous thickened appearance of the endometrium measuring 35 mm.  Potential blood products and/or clot seen within the endometrial cavity. Posterior uterine fibroid. Free fluid seen in the pelvis which is greater than normal expected physiologic volume" patient was placed in observation for further workup and management of symptomatic anemia      Overview/Hospital Course:  No notes on file    Interval History: Pt with continued bleeding overnight and hypotension that did not improve with fluid bolus. 2 more units prbc ordered and transfusion. Pts mother requesting hematology eval as they sent her to ed. Pt still dizzy on standing. Discussed risk and benefits of [place] and family would like to defer at this time due to risk of infertility.     Review of Systems  Objective:     Vital Signs (Most Recent):  Temp: 98.6 °F (37 °C) (07/13/23 0939)  Pulse: 73 (07/13/23 0939)  Resp: 18 (07/13/23 0939)  BP: (!) 94/52 (07/13/23 0939)  SpO2: 99 % (07/13/23 0939) Vital Signs (24h Range):  Temp:  [98.2 °F (36.8 °C)-100.1 °F (37.8 °C)] 98.6 °F (37 °C)  Pulse:  [70-85] 73  Resp:  [18-20] 18  SpO2:  [96 %-100 %] 99 %  BP: ()/(38-72) 94/52     Weight: 51.7 kg (114 lb)  Body mass index is 20.19 kg/m².    Intake/Output Summary (Last 24 hours) at 7/13/2023 1036  Last data filed at 7/13/2023 0815  Gross per 24 hour   Intake 120 ml   Output --   Net 120 ml         Physical Exam  Vitals reviewed.   Constitutional:       General: She is not in acute distress.     Appearance: She is not toxic-appearing.   HENT:      Head: Normocephalic and atraumatic.      Mouth/Throat:      Mouth: Mucous membranes are moist.      Pharynx: Oropharynx is clear.   Eyes:      General: No scleral icterus.     Extraocular Movements: Extraocular movements intact.   Cardiovascular:      Rate and Rhythm: Normal rate and regular rhythm. "   Pulmonary:      Effort: Pulmonary effort is normal. No respiratory distress.   Musculoskeletal:      Cervical back: Neck supple. No rigidity.   Neurological:      General: No focal deficit present.      Mental Status: She is alert and oriented to person, place, and time.   Psychiatric:         Mood and Affect: Mood normal.         Behavior: Behavior normal.           Significant Labs: All pertinent labs within the past 24 hours have been reviewed.    Significant Imaging: I have reviewed all pertinent imaging results/findings within the past 24 hours.      Assessment/Plan:      * Symptomatic anemia  Presents to the hospital due to intermittent SOB, dizziness, fatigue, near syncopal episode for the last 1.5 weeks, recent labs were drawn patient was recommended to go to the ED due to H&H 5.2/18.6  Chronic, due to blood loss from dysfunctional uterine bleeding  Transfused 2 units PRBC in ED with hgb now 7.4. Pt continues to have bleeding and it has been going on for over a year. She is followed by gyn as outpatient and hematology for transfusions of prbc and iron infusions  -Pt with more hypotension overnight despite ivf and hemoglobin dropped again. 2 more units prbc ordered and transfusioning.  -pt and family discussed possible uterine artery embolization, but would like to hold off for now due to risk of infertility.     -gyn consulted   -hematology consulted  -continue transfusion  -iron supplementation  -repeat cbc and orthostatic vs post-transfusion      UTI (urinary tract infection)  Patient presented with UA showing cloudy urine with elevated pH, +3 leukocytes, 80 WBCs and few bacteria.    IV Rocephin    Hypokalemia  Initially repleted  Tele monitoring  A.m. BMP and magnesium replete further as necessary  -resolved    Abnormal uterine bleeding (AUB)  Patient states ongoing since 11/2022, Patient follows with gyn as outpatient with previous intervention that failed.  Consult to OBGYN    Anxiety  Chronic,  stable, patient denies any current antianxiety medication, continue to monitor        VTE Risk Mitigation (From admission, onward)         Ordered     IP VTE LOW RISK PATIENT  Once         07/11/23 2145     Place sequential compression device  Until discontinued         07/11/23 2145                Discharge Planning   DESEAN:      Code Status: Full Code   Is the patient medically ready for discharge?:     Reason for patient still in hospital (select all that apply): Treatment and Consult recommendations  Discharge Plan A: Home with family                  Quintin Smith III, MD  Department of [facility] Medicine   [facility]

## 2023-07-13 NOTE — NURSING
Pt has already went threw 3 pads tonight w quarter sized clots, bleeding vaginally. VSS. Pt still feels weak.  Np aware, repeat H&H ordered per NP's okay.  Pt and family also educated.  Lab bedside.

## 2023-07-13 NOTE — NURSING
[facility]  Nurses Note -- 4 Eyes      7/13/2023       Skin assessed on: Q Shift      [x] No Pressure Injuries Present    [x]Prevention Measures Documented    [] Yes LDA  for Pressure Injury Previously documented     [] Yes New Pressure Injury Discovered   [] LDA for New Pressure Injury Added      Attending RN:  STEFF MAC RN     Second RN:  Georgie NICOLE

## 2023-07-13 NOTE — PROGRESS NOTES
07/13/23 1550   Vital Signs   /70   MAP (mmHg) 85   BP Location Left arm   BP Method Automatic   Patient Position Standing   Orthostatic VS Yes

## 2023-07-13 NOTE — HPI
Petra Medina 17 y.o. G0 female with anxiety and abnormal uterine bleeding presents to the hospital with a chief complaint of generalized weakness.  Associated symptoms include fatigue, intermittent SOB, dizziness and syncope.  Patient was seen at her PCM due to associated symptoms where they willie labs that showed H/H 5/18, patient was informed and recommended to report to ER.  Patient states has been ongoing about 1.5 weeks.  Patient was with history of dysfunctional uterine bleeding states I have been constantly bleeding since November.  Multiple iron and blood transfusion history with most recent 2 weeks ago.  Patient states she is been going through about a pad per hour (6-8 in an 8 hour period).  Pt did have syncopal episode on Friday during which patient was walking and fell and hit her nose.  She reports she was only unconscious for approximately 1 minute because she knew what time she walked outside.  She reports she is throbbing headache that are consistent with a history of anemia.  No visual disturbance, weakness, paresthesias, nausea vomiting.  Patient denied any other attempted self-treatment no other alleviating or exacerbating factors noted.  Patient follows with Michael Mueller III, MD OBSHAILA.      Pt had hysteroscopic myomectomy 3/2023 in which there was incomplete resection; however, the majority of the submucosal fibroid was excised.  Pt states she has had non-stop bleeding since then.  Pt was started on myfembree on 7/8/23, and pt states there has been no change to her bleeding..

## 2023-07-13 NOTE — ASSESSMENT & PLAN NOTE
Presents to the hospital due to intermittent SOB, dizziness, fatigue, near syncopal episode for the last 1.5 weeks, recent labs were drawn patient was recommended to go to the ED due to H&H 5.2/18.6  Chronic, due to blood loss from dysfunctional uterine bleeding  Transfused 2 units PRBC in ED with hgb now 7.4. Pt continues to have bleeding and it has been going on for over a year. She is followed by gyn as outpatient and hematology for transfusions of prbc and iron infusions  -Pt with more hypotension overnight despite ivf and hemoglobin dropped again. 2 more units prbc ordered and transfusioning.  -pt and family discussed possible uterine artery embolization, but would like to hold off for now due to risk of infertility.     -gyn consulted   -hematology consulted  -continue transfusion  -iron supplementation  -repeat cbc and orthostatic vs post-transfusion

## 2023-07-13 NOTE — CONSULTS
[facility]  Obstetrics & Gynecology  Consult Note    Patient Name: Petra Medina  MRN: [id number]  Admission Date: 7/11/2023  Hospital Length of Stay: 0 days  Code Status: Full Code  Primary Care Provider: Kay Urban MD  Principal Problem: Symptomatic anemia    Consults  Subjective:     Chief Complaint: weakness and dysfunctional uterine bleeding    History of Present Illness:  Petra Medina 17 y.o. G0 female with anxiety and abnormal uterine bleeding presents to the hospital with a chief complaint of generalized weakness.  Associated symptoms include fatigue, intermittent SOB, dizziness and syncope.  Patient was seen at her PCM due to associated symptoms where they willie labs that showed H/H 5/18, patient was informed and recommended to report to ER.  Patient states has been ongoing about 1.5 weeks.  Patient was with history of dysfunctional uterine bleeding states I have been constantly bleeding since November.  Multiple iron and blood transfusion history with most recent 2 weeks ago.  Patient states she is been going through about a pad per hour (6-8 in an 8 hour period).  Pt did have syncopal episode on Friday during which patient was walking and fell and hit her nose.  She reports she was only unconscious for approximately 1 minute because she knew what time she walked outside.  She reports she is throbbing headache that are consistent with a history of anemia.  No visual disturbance, weakness, paresthesias, nausea vomiting.  Patient denied any other attempted self-treatment no other alleviating or exacerbating factors noted.  Patient follows with Michael Mueller III, MD OBGYN.      Pt had hysteroscopic myomectomy 3/2023 in which there was incomplete resection; however, the majority of the submucosal fibroid was excised.  Pt states she has had non-stop bleeding since then.  Pt was started on myfembree on 7/8/23, and pt states there has been no change to her  bleeding..          OB History    Para Term  AB Living   0 0 0 0 0 0   SAB IAB Ectopic Multiple Live Births   0 0 0 0 0     Past Medical History:   Diagnosis Date    Abnormal uterine bleeding (AUB)     ADD (attention deficit disorder with hyperactivity)     Anemia, unspecified     Encounter for blood transfusion     Uterine fibroid      Past Surgical History:   Procedure Laterality Date    DIAGNOSTIC LAPAROSCOPY N/A 3/21/2023    Procedure: LAPAROSCOPY, DIAGNOSTIC;  Surgeon: Katja Dang MD;  Location: [facility] OR;  Service: OB/GYN;  Laterality: N/A;    HYSTEROSCOPIC RESECTION OF MYOMA N/A 3/21/2023    Procedure: MYOMECTOMY, HYSTEROSCOPIC;  Surgeon: Katja Dang MD;  Location: [facility] OR;  Service: OB/GYN;  Laterality: N/A;       PTA Medications   Medication Sig    ferrous sulfate (FEOSOL) 325 mg (65 mg iron) Tab tablet Take 325 mg by mouth daily with breakfast.    ondansetron (ZOFRAN) 8 MG tablet Take 8 mg by mouth 2 (two) times daily.    relugolix-estradiol-norethindr (MYFEMBREE) 40-1-0.5 mg Tab Take 1 tablet by mouth once daily.    ibuprofen (ADVIL,MOTRIN) 600 MG tablet Take 1 tablet (600 mg total) by mouth every 6 (six) hours as needed for Pain.       Review of patient's allergies indicates:   Allergen Reactions    Cherries      unknown    Lavender      unknown        Family History    None       Tobacco Use    Smoking status: Never    Smokeless tobacco: Never    Tobacco comments:     VAPING   Substance and Sexual Activity    Alcohol use: Not Currently     Comment: SOCIAL    Drug use: No    Sexual activity: Yes     Partners: Male     Birth control/protection: Injection     Review of Systems   Constitutional: Negative.    Respiratory: Negative.     Cardiovascular: Negative.    Gastrointestinal: Negative.    Endocrine: Negative.    Genitourinary: Negative.    Musculoskeletal: Negative.    Neurological: Negative.    Hematological: Negative.    Psychiatric/Behavioral: Negative.      Breast: negative.     Objective:     Vital Signs (Most Recent):  Temp: 98.2 °F (36.8 °C) (07/12/23 2029)  Pulse: 81 (07/12/23 2029)  Resp: 18 (07/12/23 2029)  BP: (!) 104/50 (07/12/23 2033)  SpO2: 98 % (07/12/23 2029) Vital Signs (24h Range):  Temp:  [98.2 °F (36.8 °C)-98.8 °F (37.1 °C)] 98.2 °F (36.8 °C)  Pulse:  [] 81  Resp:  [16-22] 18  SpO2:  [95 %-100 %] 98 %  BP: ()/(50-72) 104/50     Weight: 51.7 kg (114 lb)  Body mass index is 20.19 kg/m².    Patient's last menstrual period was 11/22/2022.     Physical Exam:   Constitutional: She is oriented to person, place, and time. She appears well-developed and well-nourished. No distress.    HENT:   Head: Normocephalic and atraumatic.     Neck: No thyromegaly present.     Pulmonary/Chest: Effort normal. No respiratory distress.        Abdominal: Soft. She exhibits no distension and no mass. There is no abdominal tenderness. There is no rebound and no guarding.             Musculoskeletal: Normal range of motion and moves all extremeties. No tenderness.       Neurological: She is alert and oriented to person, place, and time. No cranial nerve deficit. Coordination normal.    Skin: She is not diaphoretic.    Psychiatric: She has a normal mood and affect. Her behavior is normal. Judgment and thought content normal.      Laboratory:  CBC:   Recent Labs   Lab 07/12/23  0629 07/12/23  2132   WBC 6.74  --    RBC 3.08*  --    HGB 7.4* 7.7*   HCT 24.1* 25.2*     --    MCV 78*  --    MCH 24.0*  --    MCHC 30.7*  --      CMP:   Recent Labs   Lab 07/11/23  1819 07/12/23  0629   * 119*   CALCIUM 9.1 7.9*   ALBUMIN 3.9  --    PROT 6.7  --    * 141   K 3.0* 4.0   CO2 21* 23    112*   BUN 7 5*   CREATININE 0.7 0.6   ALKPHOS 48*  --    ALT <5*  --    AST 13  --    BILITOT 0.2  --      Pelvic sono  Diagnostic Results:  The uterus measures 9 x 6 x 6 cm. Uterine parenchyma demonstrates 4 cm posterior heterogenous fibroid.  The endometrial echo  complex is abnormally thickened and heterogenous in appearance and measures 35 mm.     The right ovary measures 3 x 1 x 2 cm. The left ovary measures 3 x 2 x 2 cm. Arterial and venous flow are preserved bilaterally.  Follicles are seen in both ovaries. Fluid is seen within the pelvis which appears greater than normal expected physiologic volume.     Impression:     1. Abnormal heterogenous thickened appearance of the endometrium measuring 35 mm.  Potential blood products and/or clot seen within the endometrial cavity.  2. Posterior uterine fibroid.  3. Free fluid seen in the pelvis which is greater than normal expected physiologic volume.        Assessment/Plan:     Renal/  Abnormal uterine bleeding (AUB)  Transfuse as needed; continue with myfembree  IR consulted and discussed uterine fibroid embolization which pt has declined at this time due to concerns for future fertility although data suggests mostly normal risks in pregnancy.        Thank you for your consult. pt to f/u with [facility] when stable for discharge    Isaias Fitzgerald MD  Obstetrics & Gynecology  [facility] - Telemetry

## 2023-07-13 NOTE — ASSESSMENT & PLAN NOTE
Transfuse as needed; continue with myfembree  IR consulted and discussed uterine fibroid embolization which pt has declined at this time due to concerns for future fertility although data suggests mostly normal risks in pregnancy.

## 2023-07-13 NOTE — HOSPITAL COURSE
"In the ED patient was afebrile without leukocytosis, H/H 5.2/18.6, microcytic hypochromic anemia noted, iron less than 10 TIBC 518, ferritin less than 1, potassium 3.0, alkaline phosphatase 48, ALT less than 5, pregnancy test negative, O positive indirect Hawa negative, UA cloudy red urine pH greater than 8, 3+ protein 1+ ketone, elevated urobilinogen, +3 leukocytes, few bacteria, 80 WBCs, RBCs >100.  Transvaginal ultrasound showed "Abnormal heterogenous thickened appearance of the endometrium measuring 35 mm.  Potential blood products and/or clot seen within the endometrial cavity. Posterior uterine fibroid. Free fluid seen in the pelvis which is greater than normal expected physiologic volume" patient was placed in observation for further workup and management of symptomatic anemia    Ob/Gyn consulted Interventional Radiology for evaluation of possible uterine fibroid embolization.  IR was willing to consider procedure; however, pt refused to consider this at this time.  Pt desires to proceed with continued use of myfembree and Fe infusions and transfusion with PRBC's as needed.  "

## 2023-07-13 NOTE — SUBJECTIVE & OBJECTIVE
OB History    Para Term  AB Living   0 0 0 0 0 0   SAB IAB Ectopic Multiple Live Births   0 0 0 0 0     Past Medical History:   Diagnosis Date    Abnormal uterine bleeding (AUB)     ADD (attention deficit disorder with hyperactivity)     Anemia, unspecified     Encounter for blood transfusion     Uterine fibroid      Past Surgical History:   Procedure Laterality Date    DIAGNOSTIC LAPAROSCOPY N/A 3/21/2023    Procedure: LAPAROSCOPY, DIAGNOSTIC;  Surgeon: Katja Dang MD;  Location: [facility] OR;  Service: OB/GYN;  Laterality: N/A;    HYSTEROSCOPIC RESECTION OF MYOMA N/A 3/21/2023    Procedure: MYOMECTOMY, HYSTEROSCOPIC;  Surgeon: Katja Dang MD;  Location: [facility] OR;  Service: OB/GYN;  Laterality: N/A;       PTA Medications   Medication Sig    ferrous sulfate (FEOSOL) 325 mg (65 mg iron) Tab tablet Take 325 mg by mouth daily with breakfast.    ondansetron (ZOFRAN) 8 MG tablet Take 8 mg by mouth 2 (two) times daily.    relugolix-estradiol-norethindr (MYFEMBREE) 40-1-0.5 mg Tab Take 1 tablet by mouth once daily.    ibuprofen (ADVIL,MOTRIN) 600 MG tablet Take 1 tablet (600 mg total) by mouth every 6 (six) hours as needed for Pain.       Review of patient's allergies indicates:   Allergen Reactions    Cherries      unknown    Lavender      unknown        Family History    None       Tobacco Use    Smoking status: Never    Smokeless tobacco: Never    Tobacco comments:     VAPING   Substance and Sexual Activity    Alcohol use: Not Currently     Comment: SOCIAL    Drug use: No    Sexual activity: Yes     Partners: Male     Birth control/protection: Injection     Review of Systems   Constitutional: Negative.    Respiratory: Negative.     Cardiovascular: Negative.    Gastrointestinal: Negative.    Endocrine: Negative.    Genitourinary: Negative.    Musculoskeletal: Negative.    Neurological: Negative.    Hematological: Negative.    Psychiatric/Behavioral: Negative.     Breast: negative.     Objective:      Vital Signs (Most Recent):  Temp: 98.2 °F (36.8 °C) (07/12/23 2029)  Pulse: 81 (07/12/23 2029)  Resp: 18 (07/12/23 2029)  BP: (!) 104/50 (07/12/23 2033)  SpO2: 98 % (07/12/23 2029) Vital Signs (24h Range):  Temp:  [98.2 °F (36.8 °C)-98.8 °F (37.1 °C)] 98.2 °F (36.8 °C)  Pulse:  [] 81  Resp:  [16-22] 18  SpO2:  [95 %-100 %] 98 %  BP: ()/(50-72) 104/50     Weight: 51.7 kg (114 lb)  Body mass index is 20.19 kg/m².    Patient's last menstrual period was 11/22/2022.     Physical Exam:   Constitutional: She is oriented to person, place, and time. She appears well-developed and well-nourished. No distress.    HENT:   Head: Normocephalic and atraumatic.     Neck: No thyromegaly present.     Pulmonary/Chest: Effort normal. No respiratory distress.        Abdominal: Soft. She exhibits no distension and no mass. There is no abdominal tenderness. There is no rebound and no guarding.             Musculoskeletal: Normal range of motion and moves all extremeties. No tenderness.       Neurological: She is alert and oriented to person, place, and time. No cranial nerve deficit. Coordination normal.    Skin: She is not diaphoretic.    Psychiatric: She has a normal mood and affect. Her behavior is normal. Judgment and thought content normal.      Laboratory:  CBC:   Recent Labs   Lab 07/12/23  0629 07/12/23  2132   WBC 6.74  --    RBC 3.08*  --    HGB 7.4* 7.7*   HCT 24.1* 25.2*     --    MCV 78*  --    MCH 24.0*  --    MCHC 30.7*  --      CMP:   Recent Labs   Lab 07/11/23  1819 07/12/23  0629   * 119*   CALCIUM 9.1 7.9*   ALBUMIN 3.9  --    PROT 6.7  --    * 141   K 3.0* 4.0   CO2 21* 23    112*   BUN 7 5*   CREATININE 0.7 0.6   ALKPHOS 48*  --    ALT <5*  --    AST 13  --    BILITOT 0.2  --      Pelvic sono  Diagnostic Results:  The uterus measures 9 x 6 x 6 cm. Uterine parenchyma demonstrates 4 cm posterior heterogenous fibroid.  The endometrial echo complex is abnormally thickened and  heterogenous in appearance and measures 35 mm.     The right ovary measures 3 x 1 x 2 cm. The left ovary measures 3 x 2 x 2 cm. Arterial and venous flow are preserved bilaterally.  Follicles are seen in both ovaries. Fluid is seen within the pelvis which appears greater than normal expected physiologic volume.     Impression:     1. Abnormal heterogenous thickened appearance of the endometrium measuring 35 mm.  Potential blood products and/or clot seen within the endometrial cavity.  2. Posterior uterine fibroid.  3. Free fluid seen in the pelvis which is greater than normal expected physiologic volume.

## 2023-07-13 NOTE — PROGRESS NOTES
07/13/23 1546   Vital Signs   /62   MAP (mmHg) 68   BP Location Left arm   Patient Position Sitting

## 2023-07-13 NOTE — NURSING
[facility]  Nurses Note -- 4 Eyes      7/13/2023       Skin assessed on: Q Shift      [x] No Pressure Injuries Present    [x]Prevention Measures Documented    [] Yes LDA  for Pressure Injury Previously documented     [] Yes New Pressure Injury Discovered   [] LDA for New Pressure Injury Added      Attending RN:  Georgie Pryor RN     Second RN:  Corina Griffith RN

## 2023-07-13 NOTE — NURSING
Manual BP is 92/40. MAP 57.  Pt only c/o being tired but per pt she has barely gotten any sleep throughout the night.  NP aware of BP.  Will give 1 L bolus per orders.

## 2023-07-13 NOTE — CONSULTS
Hematology- Oncology Consult Note :     7/13/2023    Reason for consult: Anemia    The patient location is: hospital  The chief complaint leading to consultation is:   Visit type: Virtual visit with synchronous audio and video  Total time spent with patient: 10 minutes  Each patient to whom he or she provides medical services by telemedicine is:  (1) informed of the relationship between the physician and patient and the respective role of any other health care provider with respect to management of the patient; and (2) notified that he or she may decline to receive medical services by telemedicine and may withdraw from such care at any time.          HPI    Pt is a 17 y.o. G0 female with pmhx significant for anxiety and abnormal uterine bleeding who presented to the hospital with with weakness and fatigue after being notified of low hb. Patient states that she has had issues with uterine bleeding but has been feeling worse for the past few weeks.  Of note pt has had extensive recent hx of uterine bleeding with IV iron and blood transfusions.  Patient follows with MD GORGE Galvez III and Dr. Sanches for hematology outpt.  Of note pt had hysteroscopic myomectomy 3/2023 but there was incomplete resection and she has had non-stop bleeding since then.  Pt was started on myfembree on 7/8/23 with no change in bleeding.  Hematology service was consulted for anemia.        Active Problem List with Overview Notes    Diagnosis Date Noted    Symptomatic anemia 07/11/2023    Hypokalemia 07/11/2023    UTI (urinary tract infection) 07/11/2023    Iron deficiency anemia due to chronic blood loss 03/30/2023    Abnormal uterine bleeding (AUB) 03/21/2023    S/p diagnostic laparoscopy, hysteroscopy, hysteroscopic myomectomy 3/21/2023 03/21/2023    Attention deficit hyperactivity disorder (ADHD), combined type 06/06/2018    Bilateral headache 03/28/2018    Vasodepressor syncope 02/06/2018    Anxiety 02/05/2018    Syncope  02/03/2018       Patient Active Problem List    Diagnosis Date Noted    Symptomatic anemia 07/11/2023    Hypokalemia 07/11/2023    UTI (urinary tract infection) 07/11/2023    Iron deficiency anemia due to chronic blood loss 03/30/2023    Abnormal uterine bleeding (AUB) 03/21/2023    S/p diagnostic laparoscopy, hysteroscopy, hysteroscopic myomectomy 3/21/2023 03/21/2023    Attention deficit hyperactivity disorder (ADHD), combined type 06/06/2018    Bilateral headache 03/28/2018    Vasodepressor syncope 02/06/2018    Anxiety 02/05/2018    Syncope 02/03/2018     Past Medical History:   Diagnosis Date    Abnormal uterine bleeding (AUB)     ADD (attention deficit disorder with hyperactivity)     Anemia, unspecified     Encounter for blood transfusion     Uterine fibroid       Past Surgical History:   Procedure Laterality Date    DIAGNOSTIC LAPAROSCOPY N/A 3/21/2023    Procedure: LAPAROSCOPY, DIAGNOSTIC;  Surgeon: Katja Dang MD;  Location: [facility];  Service: OB/GYN;  Laterality: N/A;    HYSTEROSCOPIC RESECTION OF MYOMA N/A 3/21/2023    Procedure: MYOMECTOMY, HYSTEROSCOPIC;  Surgeon: Katja Dang MD;  Location: [facility];  Service: OB/GYN;  Laterality: N/A;      Medications Prior to Admission   Medication Sig Dispense Refill Last Dose    ferrous sulfate (FEOSOL) 325 mg (65 mg iron) Tab tablet Take 325 mg by mouth daily with breakfast.   7/11/2023    ondansetron (ZOFRAN) 8 MG tablet Take 8 mg by mouth 2 (two) times daily.   7/11/2023    relugolix-estradiol-norethindr (MYFEMBREE) 40-1-0.5 mg Tab Take 1 tablet by mouth once daily. 84 tablet 3 7/11/2023    ibuprofen (ADVIL,MOTRIN) 600 MG tablet Take 1 tablet (600 mg total) by mouth every 6 (six) hours as needed for Pain. 30 tablet 3      Review of patient's allergies indicates:   Allergen Reactions    Cherries      unknown    Lavender      unknown      Social History     Tobacco Use    Smoking status: Never    Smokeless tobacco: Never    Tobacco comments:     VAPING    Substance Use Topics    Alcohol use: Not Currently     Comment: SOCIAL      Family History   Problem Relation Age of Onset    Heart attacks under age 50 Neg Hx     Early death Neg Hx     Congenital heart disease Neg Hx     Breast cancer Neg Hx     Colon cancer Neg Hx     Ovarian cancer Neg Hx         Review of Systems :  Review of Systems   Constitutional:  Positive for malaise/fatigue. Negative for fever.   HENT:  Negative for hearing loss and nosebleeds.    Eyes:  Negative for blurred vision.   Respiratory:  Negative for cough, sputum production and shortness of breath.    Cardiovascular:  Negative for chest pain, claudication and leg swelling.   Gastrointestinal:  Negative for blood in stool, constipation, heartburn and melena.   Genitourinary:  Negative for dysuria and hematuria.   Musculoskeletal:  Negative for joint pain and myalgias.   Skin:  Negative for itching and rash.   Neurological:  Negative for dizziness and focal weakness.   Endo/Heme/Allergies:         Heavy vaginal bleeding   Psychiatric/Behavioral:  Negative for depression. The patient is not nervous/anxious.      Physical Exam :  Wt Readings from Last 3 Encounters:   07/12/23 51.7 kg (114 lb) (22 %, Z= -0.77)*   06/22/23 49.1 kg (108 lb 3.9 oz) (12 %, Z= -1.16)*   04/12/23 49 kg (108 lb 0.4 oz) (12 %, Z= -1.16)*     * Growth percentiles are based on CDC (Girls, 2-20 Years) data.     Temp Readings from Last 3 Encounters:   07/13/23 100.1 °F (37.8 °C) (Oral)   06/28/23 98.8 °F (37.1 °C) (Oral)   04/21/23 98.6 °F (37 °C)     BP Readings from Last 3 Encounters:   07/13/23 (!) 92/50   06/28/23 (!) 97/53   06/22/23 110/66     Pulse Readings from Last 3 Encounters:   07/13/23 85   06/28/23 81   04/21/23 (!) 115     Body mass index is 20.19 kg/m².    Physical Exam      Virtual visit    Pertinent Diagnostic studies:    Recent Results (from the past 24 hour(s))   Hemoglobin    Collection Time: 07/12/23  9:32 PM   Result Value Ref Range    Hemoglobin 7.7  (L) 12.0 - 16.0 g/dL   Hematocrit    Collection Time: 07/12/23  9:32 PM   Result Value Ref Range    Hematocrit 25.2 (L) 37.0 - 48.5 %   Basic Metabolic Panel (BMP)    Collection Time: 07/13/23  4:58 AM   Result Value Ref Range    Sodium 140 136 - 145 mmol/L    Potassium 3.6 3.5 - 5.1 mmol/L    Chloride 113 (H) 95 - 110 mmol/L    CO2 20 (L) 23 - 29 mmol/L    Glucose 100 70 - 110 mg/dL    BUN 5 (L) 6 - 20 mg/dL    Creatinine 0.7 0.5 - 1.4 mg/dL    Calcium 8.2 (L) 8.7 - 10.5 mg/dL    Anion Gap 7 (L) 8 - 16 mmol/L    eGFR >60 >60 mL/min/1.73 m^2   Magnesium    Collection Time: 07/13/23  4:58 AM   Result Value Ref Range    Magnesium 2.0 1.6 - 2.6 mg/dL   CBC with Automated Differential    Collection Time: 07/13/23  4:58 AM   Result Value Ref Range    WBC 4.27 3.90 - 12.70 K/uL    RBC 2.97 (L) 4.00 - 5.40 M/uL    Hemoglobin 7.0 (L) 12.0 - 16.0 g/dL    Hematocrit 23.1 (L) 37.0 - 48.5 %    MCV 78 (L) 82 - 98 fL    MCH 23.6 (L) 27.0 - 31.0 pg    MCHC 30.3 (L) 32.0 - 36.0 g/dL    RDW 19.3 (H) 11.5 - 14.5 %    Platelets 246 150 - 450 K/uL    MPV SEE COMMENT 9.2 - 12.9 fL    Immature Granulocytes 0.2 0.0 - 0.5 %    Gran # (ANC) 1.6 (L) 1.8 - 7.7 K/uL    Immature Grans (Abs) 0.01 0.00 - 0.04 K/uL    Lymph # 1.7 1.0 - 4.8 K/uL    Mono # 0.7 0.3 - 1.0 K/uL    Eos # 0.2 0.0 - 0.5 K/uL    Baso # 0.03 0.00 - 0.20 K/uL    nRBC 0 0 /100 WBC    Gran % 37.7 (L) 38.0 - 73.0 %    Lymph % 40.5 18.0 - 48.0 %    Mono % 15.5 (H) 4.0 - 15.0 %    Eosinophil % 5.4 0.0 - 8.0 %    Basophil % 0.7 0.0 - 1.9 %    Differential Method Automated        Assessment/Recs :       Anemia secondary to uterine bleeding (fibroids)  -Agree with gyn consult  -Transfuse PRN to keep HB>7  -continue with myfembree  -Will give 1 dose of venofer today  -Continue to monitor for bleeding  -Pt has heme follow up with Dr. Sanches on 7/28/23        Time spent on case: 20 minutes    Thank you for this consult please contact us for any questions or concerns.    Griffin Ramos MD    Hematology/oncology, [facility]

## 2023-07-13 NOTE — PROGRESS NOTES
07/13/23 1542   Vital Signs   BP (!) 107/59   MAP (mmHg) 76   BP Location Left arm   BP Method Automatic   Patient Position Lying   Orthostatic VS Yes

## 2023-07-14 VITALS
HEART RATE: 60 BPM | HEIGHT: 63 IN | RESPIRATION RATE: 18 BRPM | OXYGEN SATURATION: 98 % | DIASTOLIC BLOOD PRESSURE: 55 MMHG | TEMPERATURE: 99 F | WEIGHT: 114 LBS | SYSTOLIC BLOOD PRESSURE: 99 MMHG | BODY MASS INDEX: 20.2 KG/M2

## 2023-07-14 LAB
ANION GAP SERPL CALC-SCNC: 7 MMOL/L (ref 8–16)
BASOPHILS # BLD AUTO: 0.06 K/UL (ref 0–0.2)
BASOPHILS NFR BLD: 1.1 % (ref 0–1.9)
BUN SERPL-MCNC: 6 MG/DL (ref 6–20)
CALCIUM SERPL-MCNC: 8.3 MG/DL (ref 8.7–10.5)
CHLORIDE SERPL-SCNC: 114 MMOL/L (ref 95–110)
CO2 SERPL-SCNC: 20 MMOL/L (ref 23–29)
CREAT SERPL-MCNC: 0.6 MG/DL (ref 0.5–1.4)
DIFFERENTIAL METHOD: ABNORMAL
EOSINOPHIL # BLD AUTO: 0.3 K/UL (ref 0–0.5)
EOSINOPHIL NFR BLD: 5.7 % (ref 0–8)
ERYTHROCYTE [DISTWIDTH] IN BLOOD BY AUTOMATED COUNT: 18.6 % (ref 11.5–14.5)
EST. GFR  (NO RACE VARIABLE): >60 ML/MIN/1.73 M^2
GLUCOSE SERPL-MCNC: 101 MG/DL (ref 70–110)
HCT VFR BLD AUTO: 32 % (ref 37–48.5)
HGB BLD-MCNC: 9.8 G/DL (ref 12–16)
IMM GRANULOCYTES # BLD AUTO: 0.01 K/UL (ref 0–0.04)
IMM GRANULOCYTES NFR BLD AUTO: 0.2 % (ref 0–0.5)
LYMPHOCYTES # BLD AUTO: 1.7 K/UL (ref 1–4.8)
LYMPHOCYTES NFR BLD: 32.1 % (ref 18–48)
MAGNESIUM SERPL-MCNC: 2 MG/DL (ref 1.6–2.6)
MCH RBC QN AUTO: 25.5 PG (ref 27–31)
MCHC RBC AUTO-ENTMCNC: 30.6 G/DL (ref 32–36)
MCV RBC AUTO: 83 FL (ref 82–98)
MONOCYTES # BLD AUTO: 0.8 K/UL (ref 0.3–1)
MONOCYTES NFR BLD: 15.7 % (ref 4–15)
NEUTROPHILS # BLD AUTO: 2.4 K/UL (ref 1.8–7.7)
NEUTROPHILS NFR BLD: 45.2 % (ref 38–73)
NRBC BLD-RTO: 0 /100 WBC
PLATELET # BLD AUTO: 236 K/UL (ref 150–450)
PMV BLD AUTO: ABNORMAL FL (ref 9.2–12.9)
POTASSIUM SERPL-SCNC: 3.6 MMOL/L (ref 3.5–5.1)
RBC # BLD AUTO: 3.84 M/UL (ref 4–5.4)
SODIUM SERPL-SCNC: 141 MMOL/L (ref 136–145)
WBC # BLD AUTO: 5.27 K/UL (ref 3.9–12.7)

## 2023-07-14 PROCEDURE — 80048 BASIC METABOLIC PNL TOTAL CA: CPT

## 2023-07-14 PROCEDURE — 36415 COLL VENOUS BLD VENIPUNCTURE: CPT

## 2023-07-14 PROCEDURE — 83735 ASSAY OF MAGNESIUM: CPT

## 2023-07-14 PROCEDURE — 25000003 PHARM REV CODE 250: Performed by: STUDENT IN AN ORGANIZED HEALTH CARE EDUCATION/TRAINING PROGRAM

## 2023-07-14 PROCEDURE — 85025 COMPLETE CBC W/AUTO DIFF WBC: CPT

## 2023-07-14 RX ADMIN — FERROUS SULFATE TAB 325 MG (65 MG ELEMENTAL FE) 1 EACH: 325 (65 FE) TAB at 10:07

## 2023-07-14 NOTE — NURSING
Bedside handoff received from nurse Corina RN. Patient lying in bed without any acute distress noted at this time. Safety precautions maintained.

## 2023-07-14 NOTE — PLAN OF CARE
07/14/23 0848   Final Note   Assessment Type Final Discharge Note   Anticipated Discharge Disposition Home   What phone number can be called within the next 1-3 days to see how you are doing after discharge? [phone]   Hospital Resources/Appts/Education Provided Appointments scheduled and added to AVS;Appointments scheduled by Navigator/Coordinator   Post-Acute Status   Discharge Delays None known at this time     SW secure chat Georgie (nurse): Patient cleared from case management standpoint.

## 2023-07-14 NOTE — PLAN OF CARE
Problem: Adult Inpatient Plan of Care  Goal: Plan of Care Review  7/14/2023 1048 by Georgie Pryor RN  Outcome: Adequate for Care Transition  7/14/2023 1048 by Georgie Pryor RN  Outcome: Adequate for Care Transition  7/14/2023 1047 by Georgie Pryor RN  Outcome: Ongoing, Progressing  Goal: Patient-Specific Goal (Individualized)  7/14/2023 1048 by Georgie Pryor RN  Outcome: Adequate for Care Transition  7/14/2023 1048 by Georgie Pryor RN  Outcome: Adequate for Care Transition  7/14/2023 1047 by Georgie Pryor RN  Outcome: Ongoing, Progressing  Goal: Absence of Hospital-Acquired Illness or Injury  7/14/2023 1048 by Georgie Pryor RN  Outcome: Adequate for Care Transition  7/14/2023 1048 by Georgie Pryor RN  Outcome: Adequate for Care Transition  7/14/2023 1047 by Georgie Pryor RN  Outcome: Ongoing, Progressing  Goal: Optimal Comfort and Wellbeing  7/14/2023 1048 by Georgie Pryor RN  Outcome: Adequate for Care Transition  7/14/2023 1048 by Georgie Pryor RN  Outcome: Adequate for Care Transition  7/14/2023 1047 by Georgie Pryor RN  Outcome: Ongoing, Progressing  Goal: Readiness for Transition of Care  7/14/2023 1048 by Georgie Pryor RN  Outcome: Adequate for Care Transition  7/14/2023 1048 by Georgie Pryor RN  Outcome: Adequate for Care Transition  7/14/2023 1047 by Georgie Pryor RN  Outcome: Ongoing, Progressing     Problem: Infection  Goal: Absence of Infection Signs and Symptoms  7/14/2023 1048 by Georgie Pryor RN  Outcome: Adequate for Care Transition  7/14/2023 1048 by Georgie Pryor RN  Outcome: Adequate for Care Transition  7/14/2023 1047 by Georgie Pryor RN  Outcome: Ongoing, Progressing     Problem: Anemia  Goal: Anemia Symptom Improvement  7/14/2023 1048 by Georgie Pryor RN  Outcome: Adequate for Care Transition  7/14/2023 1048 by Georgie Pryor RN  Outcome: Adequate for Care Transition  7/14/2023 1047 by Georgie Pryor RN  Outcome:  Ongoing, Progressing

## 2023-07-16 NOTE — HOSPITAL COURSE
"17F with pmh of abnormal uterine bleeding presents to the hospital with a chief complaint of generalized weakness.  Associated symptoms include fatigue, intermittent SOB, dizziness and syncope.  Patient was seen at her PCM due to associated symptoms where they willie labs that showed H/H 5/18, patient was informed and recommended to report to ER. In the ED patient was afebrile without leukocytosis, H/H 5.2/18.6, microcytic hypochromic anemia noted, iron less than 10 TIBC 518, ferritin less than 1, potassium 3.0, alkaline phosphatase 48, ALT less than 5, pregnancy test negative, O positive indirect Hawa negative, UA cloudy red urine pH greater than 8, 3+ protein 1+ ketone, elevated urobilinogen, +3 leukocytes, few bacteria, 80 WBCs, RBCs >100.  Transvaginal ultrasound showed "Abnormal heterogenous thickened appearance of the endometrium measuring 35 mm.  Potential blood products and/or clot seen within the endometrial cavity. Posterior uterine fibroid. Free fluid seen in the pelvis which is greater than normal expected physiologic volume" patient was placed in observation for further workup and management of symptomatic anemia. Pt given a total of 4 units prbc and iron dose while admitted with improvement in hgb up to 9. Pt also evaluated by gyn and hematology. Pt given option of Uterine Artery Embolization which was discussed with gyn and IR, but pt deferring at this time to make further attempts at nonsurgical tx. Pt and mother at bedside at discharge with all questions answered. Gyn and hematology referrals sent at discharge. Pt also with possible uti at admission which was adequately tx while in patient with iv rocephin.   "

## 2023-07-16 NOTE — DISCHARGE SUMMARY
[facility]  Discharge Summary      Patient Name: Petra Medina  MRN: [id number]  MELVIN: [id number]  Patient Class: IP- Inpatient  Admission Date: 7/11/2023  Hospital Length of Stay: 1 days  Discharge Date and Time: 7/14/2023 10:51 AM  Attending Physician: No att. providers found   Discharging Provider: Quintin Smith III, MD  Primary Care Provider: Kay Urban MD    Primary Care Team: Networked reference to record PCT     HPI:   Petra Medina 17 y.o. female with anxiety in abnormal uterine bleeding presents to the hospital with a chief complaint of generalized weakness.  Associated symptoms include fatigue, intermittent SOB, dizziness and syncope.  Patient was seen at her PCM due to associated symptoms where they willie labs that showed H/H 5/18, patient was informed and recommended to report to ER.  Patient states has been ongoing about 1.5 weeks.  Patient was with history of dysfunctional uterine bleeding states I have been constantly bleeding since November.  Multiple iron and blood transfusion history with most recent 2 weeks ago.  Patient states she is been going through about a pad per hour (6-8 in an 8 hour period).  Pt did have syncopal episode on Friday during which patient was walking and fell and hit her nose.  She reports she was only unconscious for approximately 1 minute because she knew what time she walked outside.  She reports she is throbbing headache that are consistent with a history of anemia.  No visual disturbance, weakness, paresthesias, nausea vomiting.  Patient denied any other attempted self-treatment no other alleviating or exacerbating factors noted.  Patient follows with Michael Mueller III, MD OBGYN.     In the ED patient was afebrile without leukocytosis, H/H 5.2/18.6, microcytic hypochromic anemia noted, iron less than 10 TIBC 518, ferritin less than 1, potassium 3.0, alkaline phosphatase 48, ALT less than 5, pregnancy  "test negative, O positive indirect Hawa negative, UA cloudy red urine pH greater than 8, 3+ protein 1+ ketone, elevated urobilinogen, +3 leukocytes, few bacteria, 80 WBCs, RBCs >100.  Transvaginal ultrasound showed "Abnormal heterogenous thickened appearance of the endometrium measuring 35 mm.  Potential blood products and/or clot seen within the endometrial cavity. Posterior uterine fibroid. Free fluid seen in the pelvis which is greater than normal expected physiologic volume" patient was placed in observation for further workup and management of symptomatic anemia      * No surgery found *      Hospital Course:   17F with pmh of abnormal uterine bleeding presents to the hospital with a chief complaint of generalized weakness.  Associated symptoms include fatigue, intermittent SOB, dizziness and syncope.  Patient was seen at her PCM due to associated symptoms where they willie labs that showed H/H 5/18, patient was informed and recommended to report to ER. In the ED patient was afebrile without leukocytosis, H/H 5.2/18.6, microcytic hypochromic anemia noted, iron less than 10 TIBC 518, ferritin less than 1, potassium 3.0, alkaline phosphatase 48, ALT less than 5, pregnancy test negative, O positive indirect Hawa negative, UA cloudy red urine pH greater than 8, 3+ protein 1+ ketone, elevated urobilinogen, +3 leukocytes, few bacteria, 80 WBCs, RBCs >100.  Transvaginal ultrasound showed "Abnormal heterogenous thickened appearance of the endometrium measuring 35 mm.  Potential blood products and/or clot seen within the endometrial cavity. Posterior uterine fibroid. Free fluid seen in the pelvis which is greater than normal expected physiologic volume" patient was placed in observation for further workup and management of symptomatic anemia. Pt given a total of 4 units prbc and iron dose while admitted with improvement in hgb up to 9. Pt also evaluated by gyn and hematology. Pt given option of Uterine Artery " Embolization which was discussed with gyn and IR, but pt deferring at this time to make further attempts at nonsurgical tx. Pt and mother at bedside at discharge with all questions answered. Gyn and hematology referrals sent at discharge. Pt also with possible uti at admission which was adequately tx while in patient with iv rocephin.        Goals of Care Treatment Preferences:  Code Status: Full Code      Consults:   Consults (From admission, onward)        Status Ordering Provider     [facility]        Provider:  Griffin Ramos MD    Completed ELISE AGUILAR III     Inpatient consult to Interventional Radiology  Once        Provider:  Pranay Morris MD    Completed EDUARD MATTA          No new Assessment & Plan notes have been filed under this hospital service since the last note was generated.  Service: Hospital Medicine    Final Active Diagnoses:    Diagnosis Date Noted POA    PRINCIPAL PROBLEM:  Symptomatic anemia [D64.9] 07/11/2023 Yes    Hypokalemia [E87.6] 07/11/2023 Yes    UTI (urinary tract infection) [N39.0] 07/11/2023 Yes    Abnormal uterine bleeding (AUB) [N93.9] 03/21/2023 Yes    Anxiety [F41.9] 02/05/2018 Yes      Problems Resolved During this Admission:       Discharged Condition: fair    Disposition: Home or Self Care    Follow Up:    Patient Instructions:      Ambulatory referral/consult to Obstetrics / Gynecology   Standing Status: Future   Referral Priority: Routine Referral Type: Consultation   Referral Reason: Specialty Services Required   Requested Specialty: Obstetrics and Gynecology   Number of Visits Requested: 1     Ambulatory referral/consult to [facility]   Standing Status: Future   Referral Priority: Urgent Referral Type: Consultation   Referral Reason: Specialty Services Required   Number of Visits Requested: 1     Ambulatory referral/consult to Hematology / Oncology   Standing Status: Future    Referral Priority: Routine Referral Type: Consultation   Referral Reason: Specialty Services Required   Requested Specialty: Hematology and Oncology   Number of Visits Requested: 1     Diet Adult Regular     Notify your health care provider if you experience any of the following:  increased confusion or weakness     Notify your health care provider if you experience any of the following:  persistent dizziness, light-headedness, or visual disturbances     Notify your health care provider if you experience any of the following:  worsening rash     Notify your health care provider if you experience any of the following:  severe persistent headache     Notify your health care provider if you experience any of the following:  difficulty breathing or increased cough     Notify your health care provider if you experience any of the following:  redness, tenderness, or signs of infection (pain, swelling, redness, odor or green/yellow discharge around incision site)     Notify your health care provider if you experience any of the following:  severe uncontrolled pain     Notify your health care provider if you experience any of the following:  persistent nausea and vomiting or diarrhea     Notify your health care provider if you experience any of the following:  temperature >100.4     Activity as tolerated       Significant Diagnostic Studies: Labs: All labs within the past 24 hours have been reviewed    Pending Diagnostic Studies:     None         Medications:  Reconciled Home Medications:      Medication List      CONTINUE taking these medications    ferrous sulfate 325 mg (65 mg iron) Tab tablet  Commonly known as: FEOSOL  Take 325 mg by mouth daily with breakfast.     MYFEMBREE 40-1-0.5 mg Tab  Generic drug: relugolix-estradiol-norethindr  Take 1 tablet by mouth once daily.     ondansetron 8 MG tablet  Commonly known as: ZOFRAN  Take 8 mg by mouth 2 (two) times daily.        STOP taking these medications    ibuprofen  600 MG tablet  Commonly known as: ADVIL,MOTRIN            Indwelling Lines/Drains at time of discharge:   Lines/Drains/Airways     None                 Time spent on the discharge of patient: >35 minutes         Quintin Smith III, MD  Department of [facility] Medicine  [facility] [place]

## 2023-07-20 ENCOUNTER — OFFICE VISIT (OUTPATIENT)
Dept: OBSTETRICS AND GYNECOLOGY | Facility: CLINIC | Age: 18
End: 2023-07-20
Payer: COMMERCIAL

## 2023-07-20 VITALS
SYSTOLIC BLOOD PRESSURE: 108 MMHG | BODY MASS INDEX: 19.64 KG/M2 | HEIGHT: 63 IN | WEIGHT: 110.88 LBS | DIASTOLIC BLOOD PRESSURE: 68 MMHG

## 2023-07-20 DIAGNOSIS — D50.0 IRON DEFICIENCY ANEMIA DUE TO CHRONIC BLOOD LOSS: ICD-10-CM

## 2023-07-20 DIAGNOSIS — N93.9 ABNORMAL UTERINE BLEEDING (AUB): Primary | ICD-10-CM

## 2023-07-20 DIAGNOSIS — D21.9 FIBROIDS: ICD-10-CM

## 2023-07-20 PROCEDURE — 99999 PR PBB SHADOW E&M-EST. PATIENT-LVL III: CPT | Mod: PBBFAC,,, | Performed by: STUDENT IN AN ORGANIZED HEALTH CARE EDUCATION/TRAINING PROGRAM

## 2023-07-20 PROCEDURE — 99999 PR PBB SHADOW E&M-EST. PATIENT-LVL III: ICD-10-PCS | Mod: PBBFAC,,, | Performed by: STUDENT IN AN ORGANIZED HEALTH CARE EDUCATION/TRAINING PROGRAM

## 2023-07-20 PROCEDURE — 99214 PR OFFICE/OUTPT VISIT, EST, LEVL IV, 30-39 MIN: ICD-10-PCS | Mod: S$GLB,,, | Performed by: STUDENT IN AN ORGANIZED HEALTH CARE EDUCATION/TRAINING PROGRAM

## 2023-07-20 PROCEDURE — 3078F PR MOST RECENT DIASTOLIC BLOOD PRESSURE < 80 MM HG: ICD-10-PCS | Mod: CPTII,S$GLB,, | Performed by: STUDENT IN AN ORGANIZED HEALTH CARE EDUCATION/TRAINING PROGRAM

## 2023-07-20 PROCEDURE — 1111F DSCHRG MED/CURRENT MED MERGE: CPT | Mod: CPTII,S$GLB,, | Performed by: STUDENT IN AN ORGANIZED HEALTH CARE EDUCATION/TRAINING PROGRAM

## 2023-07-20 PROCEDURE — 99214 OFFICE O/P EST MOD 30 MIN: CPT | Mod: S$GLB,,, | Performed by: STUDENT IN AN ORGANIZED HEALTH CARE EDUCATION/TRAINING PROGRAM

## 2023-07-20 PROCEDURE — 3078F DIAST BP <80 MM HG: CPT | Mod: CPTII,S$GLB,, | Performed by: STUDENT IN AN ORGANIZED HEALTH CARE EDUCATION/TRAINING PROGRAM

## 2023-07-20 PROCEDURE — 1111F PR DISCHARGE MEDS RECONCILED W/ CURRENT OUTPATIENT MED LIST: ICD-10-PCS | Mod: CPTII,S$GLB,, | Performed by: STUDENT IN AN ORGANIZED HEALTH CARE EDUCATION/TRAINING PROGRAM

## 2023-07-20 PROCEDURE — 3008F BODY MASS INDEX DOCD: CPT | Mod: CPTII,S$GLB,, | Performed by: STUDENT IN AN ORGANIZED HEALTH CARE EDUCATION/TRAINING PROGRAM

## 2023-07-20 PROCEDURE — 1159F PR MEDICATION LIST DOCUMENTED IN MEDICAL RECORD: ICD-10-PCS | Mod: CPTII,S$GLB,, | Performed by: STUDENT IN AN ORGANIZED HEALTH CARE EDUCATION/TRAINING PROGRAM

## 2023-07-20 PROCEDURE — 3074F SYST BP LT 130 MM HG: CPT | Mod: CPTII,S$GLB,, | Performed by: STUDENT IN AN ORGANIZED HEALTH CARE EDUCATION/TRAINING PROGRAM

## 2023-07-20 PROCEDURE — 3008F PR BODY MASS INDEX (BMI) DOCUMENTED: ICD-10-PCS | Mod: CPTII,S$GLB,, | Performed by: STUDENT IN AN ORGANIZED HEALTH CARE EDUCATION/TRAINING PROGRAM

## 2023-07-20 PROCEDURE — 1159F MED LIST DOCD IN RCRD: CPT | Mod: CPTII,S$GLB,, | Performed by: STUDENT IN AN ORGANIZED HEALTH CARE EDUCATION/TRAINING PROGRAM

## 2023-07-20 PROCEDURE — 3074F PR MOST RECENT SYSTOLIC BLOOD PRESSURE < 130 MM HG: ICD-10-PCS | Mod: CPTII,S$GLB,, | Performed by: STUDENT IN AN ORGANIZED HEALTH CARE EDUCATION/TRAINING PROGRAM

## 2023-07-20 RX ORDER — IBUPROFEN 200 MG
200 TABLET ORAL EVERY 6 HOURS PRN
COMMUNITY

## 2023-07-21 RX ORDER — VALACYCLOVIR HYDROCHLORIDE 1 G/1
1000 TABLET, FILM COATED ORAL DAILY
Qty: 5 TABLET | Refills: 3 | Status: SHIPPED | OUTPATIENT
Start: 2023-07-21 | End: 2023-09-29

## 2023-07-21 NOTE — PROGRESS NOTES
"History & Physical  Gynecology      SUBJECTIVE:     Chief Complaint: Follow-up       History of Present Illness:  Petra Medina is a 17 y.o. F who presents for follow up after recent admission at outside hospital for acute exacerbation of chronic anemia due to fibroids. She was found to have an H/H of 5/18 on outpatient labs and was instructed to go to the ED. She was admitted for several days, had 4u pRBCs transfused. GYN there recommend IR consult for [place], which she declined. Since discharge on , she has been feeling good. She is still having some bleeding but it is fairly light. No "bleeding flares", no s/s of anemia. She was only able to start the Myfembree about 1.5 weeks ago due to insurance approval. She is happy to continue with our current plan to see if the Myfembree will help. She has follow up with heme/onc next week. She has not been contacted by Dr. Valdovinos's office.      Review of patient's allergies indicates:   Allergen Reactions    Cherries      unknown    Lavender      unknown       Past Medical History:   Diagnosis Date    Abnormal uterine bleeding (AUB)     ADD (attention deficit disorder with hyperactivity)     Anemia, unspecified     Encounter for blood transfusion     Uterine fibroid      Past Surgical History:   Procedure Laterality Date    DIAGNOSTIC LAPAROSCOPY N/A 3/21/2023    Procedure: LAPAROSCOPY, DIAGNOSTIC;  Surgeon: Katja Dang MD;  Location: [facility];  Service: OB/GYN;  Laterality: N/A;    HYSTEROSCOPIC RESECTION OF MYOMA N/A 3/21/2023    Procedure: MYOMECTOMY, HYSTEROSCOPIC;  Surgeon: Katja Dang MD;  Location: [facility];  Service: OB/GYN;  Laterality: N/A;     OB History          0    Para   0    Term   0       0    AB   0    Living   0         SAB   0    IAB   0    Ectopic   0    Multiple   0    Live Births   0               Family History   Problem Relation Age of Onset    Heart attacks under age 50 Neg Hx     Early death Neg Hx     " Congenital heart disease Neg Hx     Breast cancer Neg Hx     Colon cancer Neg Hx     Ovarian cancer Neg Hx      Social History     Tobacco Use    Smoking status: Never    Smokeless tobacco: Never    Tobacco comments:     VAPING   Substance Use Topics    Alcohol use: Not Currently     Comment: SOCIAL    Drug use: No       Current Outpatient Medications   Medication Sig    ferrous sulfate (FEOSOL) 325 mg (65 mg iron) Tab tablet Take 325 mg by mouth daily with breakfast.    ibuprofen (ADVIL,MOTRIN) 200 MG tablet Take 200 mg by mouth every 6 (six) hours as needed for Pain.    relugolix-estradiol-norethindr (MYFEMBREE) 40-1-0.5 mg Tab Take 1 tablet by mouth once daily.    ondansetron (ZOFRAN) 8 MG tablet Take 8 mg by mouth 2 (two) times daily.     No current facility-administered medications for this visit.         Review of Systems:  Review of Systems   Constitutional:  Negative for fever.   Respiratory:  Negative for shortness of breath.    Cardiovascular:  Negative for chest pain.   Gastrointestinal:  Negative for abdominal pain, nausea and vomiting.   Genitourinary:  Positive for vaginal bleeding. Negative for menstrual problem and pelvic pain.   Neurological:  Negative for syncope and headaches.      OBJECTIVE:     Physical Exam:  Physical Exam  Vitals reviewed.   Constitutional:       General: She is not in acute distress.     Appearance: She is well-developed. She is not diaphoretic.   HENT:      Head: Normocephalic and atraumatic.      Nose: Nose normal.   Eyes:      Extraocular Movements: Extraocular movements intact.      Conjunctiva/sclera: Conjunctivae normal.   Cardiovascular:      Rate and Rhythm: Normal rate.   Pulmonary:      Effort: Pulmonary effort is normal. No respiratory distress.   Abdominal:      Palpations: Abdomen is soft.      Tenderness: There is no abdominal tenderness.   Musculoskeletal:         General: No tenderness. Normal range of motion.      Cervical back: Normal range of motion.    Skin:     General: Skin is warm and dry.   Neurological:      Mental Status: She is alert and oriented to person, place, and time.   Psychiatric:         Behavior: Behavior normal.         Thought Content: Thought content normal.         Judgment: Judgment normal.         ASSESSMENT:       ICD-10-CM ICD-9-CM    1. Abnormal uterine bleeding (AUB)  N93.9 626.9 CBC W/ AUTO DIFFERENTIAL      MRI Female Pelvis W W/O Contrast      IRON AND TIBC      FERRITIN      2. Fibroids  D21.9 215.9       3. Iron deficiency anemia due to chronic blood loss  D50.0 280.0              Plan:      -- Will continue with Myfembree and monitor bleeding for now.  -- MRI pelvis ordered for possible surgical planning. Will discuss case with colleague who performs robotic myomectomy.  -- Concerns regarding fertility discussed. At this time, do not feel it is necessary to start planning for egg cryo. Will continue to discuss.  -- Follow up with heme/onc.  -- Follow up appt scheduled after MRI.        Katja Dang MD

## 2023-07-24 ENCOUNTER — OFFICE VISIT (OUTPATIENT)
Dept: FAMILY MEDICINE | Facility: CLINIC | Age: 18
End: 2023-07-24
Payer: COMMERCIAL

## 2023-07-24 VITALS
TEMPERATURE: 99 F | WEIGHT: 111.13 LBS | BODY MASS INDEX: 19.69 KG/M2 | HEIGHT: 63 IN | SYSTOLIC BLOOD PRESSURE: 98 MMHG | DIASTOLIC BLOOD PRESSURE: 62 MMHG | HEART RATE: 88 BPM | OXYGEN SATURATION: 97 %

## 2023-07-24 DIAGNOSIS — Z11.59 NEED FOR HEPATITIS C SCREENING TEST: ICD-10-CM

## 2023-07-24 DIAGNOSIS — F32.A ANXIETY AND DEPRESSION: ICD-10-CM

## 2023-07-24 DIAGNOSIS — F90.9 ATTENTION DEFICIT DISORDER (ADD), CHILD, WITH HYPERACTIVITY: ICD-10-CM

## 2023-07-24 DIAGNOSIS — Z11.4 ENCOUNTER FOR SCREENING FOR HIV: ICD-10-CM

## 2023-07-24 DIAGNOSIS — Z00.00 ANNUAL PHYSICAL EXAM: Primary | ICD-10-CM

## 2023-07-24 DIAGNOSIS — F41.9 ANXIETY AND DEPRESSION: ICD-10-CM

## 2023-07-24 PROCEDURE — 1160F PR REVIEW ALL MEDS BY PRESCRIBER/CLIN PHARMACIST DOCUMENTED: ICD-10-PCS | Mod: CPTII,S$GLB,, | Performed by: INTERNAL MEDICINE

## 2023-07-24 PROCEDURE — 3008F BODY MASS INDEX DOCD: CPT | Mod: CPTII,S$GLB,, | Performed by: INTERNAL MEDICINE

## 2023-07-24 PROCEDURE — 99999 PR PBB SHADOW E&M-EST. PATIENT-LVL IV: CPT | Mod: PBBFAC,,, | Performed by: INTERNAL MEDICINE

## 2023-07-24 PROCEDURE — 1160F RVW MEDS BY RX/DR IN RCRD: CPT | Mod: CPTII,S$GLB,, | Performed by: INTERNAL MEDICINE

## 2023-07-24 PROCEDURE — 3074F PR MOST RECENT SYSTOLIC BLOOD PRESSURE < 130 MM HG: ICD-10-PCS | Mod: CPTII,S$GLB,, | Performed by: INTERNAL MEDICINE

## 2023-07-24 PROCEDURE — 3008F PR BODY MASS INDEX (BMI) DOCUMENTED: ICD-10-PCS | Mod: CPTII,S$GLB,, | Performed by: INTERNAL MEDICINE

## 2023-07-24 PROCEDURE — 99385 PR PREVENTIVE VISIT,NEW,18-39: ICD-10-PCS | Mod: S$GLB,,, | Performed by: INTERNAL MEDICINE

## 2023-07-24 PROCEDURE — 3078F PR MOST RECENT DIASTOLIC BLOOD PRESSURE < 80 MM HG: ICD-10-PCS | Mod: CPTII,S$GLB,, | Performed by: INTERNAL MEDICINE

## 2023-07-24 PROCEDURE — 99385 PREV VISIT NEW AGE 18-39: CPT | Mod: S$GLB,,, | Performed by: INTERNAL MEDICINE

## 2023-07-24 PROCEDURE — 1159F MED LIST DOCD IN RCRD: CPT | Mod: CPTII,S$GLB,, | Performed by: INTERNAL MEDICINE

## 2023-07-24 PROCEDURE — 3078F DIAST BP <80 MM HG: CPT | Mod: CPTII,S$GLB,, | Performed by: INTERNAL MEDICINE

## 2023-07-24 PROCEDURE — 3074F SYST BP LT 130 MM HG: CPT | Mod: CPTII,S$GLB,, | Performed by: INTERNAL MEDICINE

## 2023-07-24 PROCEDURE — 99999 PR PBB SHADOW E&M-EST. PATIENT-LVL IV: ICD-10-PCS | Mod: PBBFAC,,, | Performed by: INTERNAL MEDICINE

## 2023-07-24 PROCEDURE — 1159F PR MEDICATION LIST DOCUMENTED IN MEDICAL RECORD: ICD-10-PCS | Mod: CPTII,S$GLB,, | Performed by: INTERNAL MEDICINE

## 2023-07-24 RX ORDER — SERTRALINE HYDROCHLORIDE 25 MG/1
25 TABLET, FILM COATED ORAL DAILY
Qty: 30 TABLET | Refills: 0 | Status: SHIPPED | OUTPATIENT
Start: 2023-07-24 | End: 2023-08-24

## 2023-07-24 NOTE — PROGRESS NOTES
SUBJECTIVE     Chief Complaint   Patient presents with    Establish Care       HPI  Petra Medina is a 17 y.o. female with multiple medical diagnoses as listed in the medical history and problem list that presents for annual exam. Pt has been doing well since her last visit. She has a good appetite and eats well. She does exercise by walking as much as tolerated with symptomatic anemia. She sleeps for ~7-9 hours nightly. Pt does take OTC supplements, which is an iron supplement. She does have any current stressors. Pt is UTD on age appropriate CA screening.    PAST MEDICAL HISTORY:  Past Medical History:   Diagnosis Date    Abnormal uterine bleeding (AUB)     ADD (attention deficit disorder with hyperactivity)     Anemia, unspecified     Anxiety and depression     Encounter for blood transfusion     Uterine fibroid        PAST SURGICAL HISTORY:  Past Surgical History:   Procedure Laterality Date    DIAGNOSTIC LAPAROSCOPY N/A 3/21/2023    Procedure: LAPAROSCOPY, DIAGNOSTIC;  Surgeon: Katja Dang MD;  Location: [facility];  Service: OB/GYN;  Laterality: N/A;    HYSTEROSCOPIC RESECTION OF MYOMA N/A 3/21/2023    Procedure: MYOMECTOMY, HYSTEROSCOPIC;  Surgeon: Katja Dang MD;  Location: [facility];  Service: OB/GYN;  Laterality: N/A;       SOCIAL HISTORY:  Social History     Socioeconomic History    Marital status: Single   Tobacco Use    Smoking status: Never    Smokeless tobacco: Never    Tobacco comments:     VAPING   Substance and Sexual Activity    Alcohol use: Not Currently     Comment: SOCIAL    Drug use: No    Sexual activity: Yes     Partners: Male   Social History Narrative    10th grade at [employer]       FAMILY HISTORY:  Family History   Problem Relation Age of Onset    No Known Problems Mother     Hypertension Father     Heart disease Father     Heart attacks under age 50 Neg Hx     Early death Neg Hx     Congenital heart disease Neg Hx     Breast cancer Neg Hx     Colon cancer Neg Hx      "Ovarian cancer Neg Hx        ALLERGIES AND MEDICATIONS: updated and reviewed.  Review of patient's allergies indicates:   Allergen Reactions    Cherries      unknown    Lavender      unknown     Current Outpatient Medications   Medication Sig Dispense Refill    ferrous sulfate (FEOSOL) 325 mg (65 mg iron) Tab tablet Take 325 mg by mouth daily with breakfast.      ibuprofen (ADVIL,MOTRIN) 200 MG tablet Take 200 mg by mouth every 6 (six) hours as needed for Pain.      relugolix-estradiol-norethindr (MYFEMBREE) 40-1-0.5 mg Tab Take 1 tablet by mouth once daily. 84 tablet 3    ondansetron (ZOFRAN) 8 MG tablet Take 8 mg by mouth 2 (two) times daily.      sertraline (ZOLOFT) 25 MG tablet Take 1 tablet (25 mg total) by mouth once daily. 30 tablet 0    valACYclovir (VALTREX) 1000 MG tablet Take 1 tablet (1,000 mg total) by mouth once daily. for 5 days (Patient not taking: Reported on 7/24/2023) 5 tablet 3     No current facility-administered medications for this visit.       ROS  Review of Systems   Constitutional:  Negative for chills and fever.   HENT:  Negative for hearing loss and sore throat.    Eyes:  Negative for visual disturbance.   Respiratory:  Negative for cough and shortness of breath.    Cardiovascular:  Negative for chest pain, palpitations and leg swelling.   Gastrointestinal:  Negative for abdominal pain, constipation, diarrhea, nausea and vomiting.   Genitourinary:  Positive for frequency and menstrual problem. Negative for dysuria and urgency.   Musculoskeletal:  Negative for arthralgias, joint swelling and myalgias.   Skin:  Negative for rash and wound.   Neurological:  Negative for headaches.   Psychiatric/Behavioral:  Negative for agitation and confusion. The patient is nervous/anxious.        OBJECTIVE     Physical Exam  Vitals:    07/24/23 1408   BP: 98/62   Pulse: 88   Temp: 99 °F (37.2 °C)    Body mass index is 19.68 kg/m².  Weight: 50.4 kg (111 lb 1.8 oz)   Height: 5' 3" (160 cm)     Physical " Exam  Constitutional:       General: She is not in acute distress.     Appearance: She is well-developed.   HENT:      Head: Normocephalic and atraumatic.      Right Ear: Tympanic membrane, ear canal and external ear normal.      Left Ear: Tympanic membrane, ear canal and external ear normal.      Nose: Nose normal.   Eyes:      General: No scleral icterus.        Right eye: No discharge.         Left eye: No discharge.      Conjunctiva/sclera: Conjunctivae normal.   Neck:      Vascular: No JVD.      Trachea: No tracheal deviation.   Cardiovascular:      Rate and Rhythm: Normal rate and regular rhythm.      Heart sounds: No murmur heard.    No friction rub. No gallop.   Pulmonary:      Effort: Pulmonary effort is normal. No respiratory distress.      Breath sounds: Normal breath sounds. No wheezing.   Abdominal:      General: Bowel sounds are normal. There is no distension.      Palpations: Abdomen is soft. There is no mass.      Tenderness: There is no abdominal tenderness. There is no guarding or rebound.   Musculoskeletal:         General: No tenderness or deformity. Normal range of motion.      Cervical back: Normal range of motion and neck supple.   Skin:     General: Skin is warm and dry.      Findings: No erythema or rash.   Neurological:      Mental Status: She is alert and oriented to person, place, and time.      Motor: No abnormal muscle tone.      Coordination: Coordination normal.   Psychiatric:         Behavior: Behavior normal.         Thought Content: Thought content normal.         Judgment: Judgment normal.         Health Maintenance         Date Due Completion Date    Hepatitis C Screening Never done ---    Lipid Panel Never done ---    HIV Screening Never done ---    COVID-19 Vaccine (5 - Pfizer series) 08/15/2022 6/20/2022    Influenza Vaccine (1) 09/01/2023 11/3/2021    Chlamydia Screening 01/09/2024 1/9/2023    TETANUS VACCINE 08/22/2024 8/22/2014              ASSESSMENT     17 y.o. female  with     1. Annual physical exam    2. Attention deficit disorder (ADD), child, with hyperactivity    3. Anxiety and depression    4. Need for hepatitis C screening test    5. Encounter for screening for HIV        PLAN:     1. Annual physical exam  - Counseled on age appropriate medical preventative services, including age appropriate cancer screenings, over all nutritional health, need for a consistent exercise regimen and an over all push towards maintaining a vigorous and active lifestyle.  Counseled on age appropriate vaccines and discussed upcoming health care needs based on age/gender.  Spent time with patient counseling on need for a good patient/doctor relationship moving forward.  Discussed use of common OTC medications and supplements.  Discussed common dietary aids and use of caffeine and the need for good sleep hygiene and stress management.  - Lipid Panel; Future    2. Attention deficit disorder (ADD), child, with hyperactivity  - Ambulatory referral/consult to Psychology; Future    3. Anxiety and depression  - Resume Sertraline  - sertraline (ZOLOFT) 25 MG tablet; Take 1 tablet (25 mg total) by mouth once daily.  Dispense: 30 tablet; Refill: 0    4. Need for hepatitis C screening test  - Hepatitis C Antibody; Future    5. Encounter for screening for HIV  - HIV 1/2 Ag/Ab (4th Gen); Future        RTC in 4 weeks for repeat assessment of current treatment plan       Bela Reyes MD  07/24/2023 2:20 PM        No follow-ups on file.

## 2023-07-25 ENCOUNTER — PATIENT MESSAGE (OUTPATIENT)
Dept: OBSTETRICS AND GYNECOLOGY | Facility: CLINIC | Age: 18
End: 2023-07-25
Payer: COMMERCIAL

## 2023-07-26 ENCOUNTER — LAB VISIT (OUTPATIENT)
Dept: LAB | Facility: HOSPITAL | Age: 18
End: 2023-07-26
Attending: STUDENT IN AN ORGANIZED HEALTH CARE EDUCATION/TRAINING PROGRAM
Payer: COMMERCIAL

## 2023-07-26 ENCOUNTER — TELEPHONE (OUTPATIENT)
Dept: OBSTETRICS AND GYNECOLOGY | Facility: CLINIC | Age: 18
End: 2023-07-26
Payer: COMMERCIAL

## 2023-07-26 DIAGNOSIS — Z11.4 ENCOUNTER FOR SCREENING FOR HIV: ICD-10-CM

## 2023-07-26 DIAGNOSIS — Z00.00 ANNUAL PHYSICAL EXAM: ICD-10-CM

## 2023-07-26 DIAGNOSIS — Z11.59 NEED FOR HEPATITIS C SCREENING TEST: ICD-10-CM

## 2023-07-26 DIAGNOSIS — N93.9 ABNORMAL UTERINE BLEEDING (AUB): ICD-10-CM

## 2023-07-26 LAB
BASOPHILS # BLD AUTO: 0.05 K/UL (ref 0–0.2)
BASOPHILS NFR BLD: 1.2 % (ref 0–1.9)
CHOLEST SERPL-MCNC: 189 MG/DL (ref 120–199)
CHOLEST/HDLC SERPL: 3.6 {RATIO} (ref 2–5)
DIFFERENTIAL METHOD: ABNORMAL
EOSINOPHIL # BLD AUTO: 0.1 K/UL (ref 0–0.5)
EOSINOPHIL NFR BLD: 2.6 % (ref 0–8)
ERYTHROCYTE [DISTWIDTH] IN BLOOD BY AUTOMATED COUNT: 19.5 % (ref 11.5–14.5)
FERRITIN SERPL-MCNC: 55 NG/ML (ref 20–300)
HCT VFR BLD AUTO: 43.4 % (ref 37–48.5)
HCV AB SERPL QL IA: NORMAL
HDLC SERPL-MCNC: 52 MG/DL (ref 40–75)
HDLC SERPL: 27.5 % (ref 20–50)
HGB BLD-MCNC: 13.4 G/DL (ref 12–16)
HIV 1+2 AB+HIV1 P24 AG SERPL QL IA: NORMAL
IMM GRANULOCYTES # BLD AUTO: 0.01 K/UL (ref 0–0.04)
IMM GRANULOCYTES NFR BLD AUTO: 0.2 % (ref 0–0.5)
IRON SERPL-MCNC: 171 UG/DL (ref 30–160)
LDLC SERPL CALC-MCNC: 118.6 MG/DL (ref 63–159)
LYMPHOCYTES # BLD AUTO: 1.2 K/UL (ref 1–4.8)
LYMPHOCYTES NFR BLD: 29.2 % (ref 18–48)
MCH RBC QN AUTO: 25.8 PG (ref 27–31)
MCHC RBC AUTO-ENTMCNC: 30.9 G/DL (ref 32–36)
MCV RBC AUTO: 84 FL (ref 82–98)
MONOCYTES # BLD AUTO: 0.3 K/UL (ref 0.3–1)
MONOCYTES NFR BLD: 7.4 % (ref 4–15)
NEUTROPHILS # BLD AUTO: 2.5 K/UL (ref 1.8–7.7)
NEUTROPHILS NFR BLD: 59.4 % (ref 38–73)
NONHDLC SERPL-MCNC: 137 MG/DL
NRBC BLD-RTO: 0 /100 WBC
PLATELET # BLD AUTO: 199 K/UL (ref 150–450)
PMV BLD AUTO: ABNORMAL FL (ref 9.2–12.9)
RBC # BLD AUTO: 5.19 M/UL (ref 4–5.4)
SATURATED IRON: 36 % (ref 20–50)
TOTAL IRON BINDING CAPACITY: 471 UG/DL (ref 250–450)
TRANSFERRIN SERPL-MCNC: 318 MG/DL (ref 200–375)
TRIGL SERPL-MCNC: 92 MG/DL (ref 30–150)
WBC # BLD AUTO: 4.18 K/UL (ref 3.9–12.7)

## 2023-07-26 PROCEDURE — 87389 HIV-1 AG W/HIV-1&-2 AB AG IA: CPT | Performed by: INTERNAL MEDICINE

## 2023-07-26 PROCEDURE — 82728 ASSAY OF FERRITIN: CPT | Performed by: STUDENT IN AN ORGANIZED HEALTH CARE EDUCATION/TRAINING PROGRAM

## 2023-07-26 PROCEDURE — 86803 HEPATITIS C AB TEST: CPT | Performed by: INTERNAL MEDICINE

## 2023-07-26 PROCEDURE — 85025 COMPLETE CBC W/AUTO DIFF WBC: CPT | Performed by: STUDENT IN AN ORGANIZED HEALTH CARE EDUCATION/TRAINING PROGRAM

## 2023-07-26 PROCEDURE — 80061 LIPID PANEL: CPT | Performed by: INTERNAL MEDICINE

## 2023-07-26 PROCEDURE — 84466 ASSAY OF TRANSFERRIN: CPT | Performed by: STUDENT IN AN ORGANIZED HEALTH CARE EDUCATION/TRAINING PROGRAM

## 2023-07-26 PROCEDURE — 36415 COLL VENOUS BLD VENIPUNCTURE: CPT | Performed by: INTERNAL MEDICINE

## 2023-07-26 NOTE — TELEPHONE ENCOUNTER
Called to discuss labs, H/H WNL. She is still having bleeding, feels like she is in the middle of a flare. Seeing heme-onc Friday. Would like to plan for repeat labs prior to next visit with me on 8/21. MRI scheduled for 8/14.      Katja Dang MD

## 2023-07-28 ENCOUNTER — LAB VISIT (OUTPATIENT)
Dept: LAB | Facility: OTHER | Age: 18
End: 2023-07-28
Attending: INTERNAL MEDICINE
Payer: COMMERCIAL

## 2023-07-28 ENCOUNTER — INFUSION (OUTPATIENT)
Dept: INFUSION THERAPY | Facility: OTHER | Age: 18
End: 2023-07-28
Attending: STUDENT IN AN ORGANIZED HEALTH CARE EDUCATION/TRAINING PROGRAM
Payer: COMMERCIAL

## 2023-07-28 ENCOUNTER — TELEPHONE (OUTPATIENT)
Dept: HEMATOLOGY/ONCOLOGY | Facility: CLINIC | Age: 18
End: 2023-07-28
Payer: COMMERCIAL

## 2023-07-28 ENCOUNTER — OFFICE VISIT (OUTPATIENT)
Dept: HEMATOLOGY/ONCOLOGY | Facility: CLINIC | Age: 18
End: 2023-07-28
Payer: COMMERCIAL

## 2023-07-28 VITALS
SYSTOLIC BLOOD PRESSURE: 119 MMHG | OXYGEN SATURATION: 97 % | TEMPERATURE: 99 F | BODY MASS INDEX: 19.1 KG/M2 | DIASTOLIC BLOOD PRESSURE: 78 MMHG | WEIGHT: 107.81 LBS | HEIGHT: 63 IN | RESPIRATION RATE: 16 BRPM | HEART RATE: 80 BPM

## 2023-07-28 DIAGNOSIS — D50.0 IRON DEFICIENCY ANEMIA DUE TO CHRONIC BLOOD LOSS: Primary | ICD-10-CM

## 2023-07-28 DIAGNOSIS — N93.9 ABNORMAL UTERINE BLEEDING (AUB): ICD-10-CM

## 2023-07-28 DIAGNOSIS — D53.9 NUTRITIONAL ANEMIA: ICD-10-CM

## 2023-07-28 LAB
APTT PPP: 27.6 SEC (ref 21–32)
INR PPP: 1.1 (ref 0.8–1.2)
PLATELET FUNCTION ASSAY - EPINEPHRINE: 187 SECS (ref 76–199)
PROTHROMBIN TIME: 11.8 SEC (ref 9–12.5)
VIT B12 SERPL-MCNC: 331 PG/ML (ref 210–950)

## 2023-07-28 PROCEDURE — 1111F PR DISCHARGE MEDS RECONCILED W/ CURRENT OUTPATIENT MED LIST: ICD-10-PCS | Mod: CPTII,S$GLB,, | Performed by: STUDENT IN AN ORGANIZED HEALTH CARE EDUCATION/TRAINING PROGRAM

## 2023-07-28 PROCEDURE — 63600175 PHARM REV CODE 636 W HCPCS: Mod: JZ,JG | Performed by: STUDENT IN AN ORGANIZED HEALTH CARE EDUCATION/TRAINING PROGRAM

## 2023-07-28 PROCEDURE — 1159F PR MEDICATION LIST DOCUMENTED IN MEDICAL RECORD: ICD-10-PCS | Mod: CPTII,S$GLB,, | Performed by: STUDENT IN AN ORGANIZED HEALTH CARE EDUCATION/TRAINING PROGRAM

## 2023-07-28 PROCEDURE — 3008F PR BODY MASS INDEX (BMI) DOCUMENTED: ICD-10-PCS | Mod: CPTII,S$GLB,, | Performed by: STUDENT IN AN ORGANIZED HEALTH CARE EDUCATION/TRAINING PROGRAM

## 2023-07-28 PROCEDURE — 1160F PR REVIEW ALL MEDS BY PRESCRIBER/CLIN PHARMACIST DOCUMENTED: ICD-10-PCS | Mod: CPTII,S$GLB,, | Performed by: STUDENT IN AN ORGANIZED HEALTH CARE EDUCATION/TRAINING PROGRAM

## 2023-07-28 PROCEDURE — 25000003 PHARM REV CODE 250: Performed by: STUDENT IN AN ORGANIZED HEALTH CARE EDUCATION/TRAINING PROGRAM

## 2023-07-28 PROCEDURE — 85730 THROMBOPLASTIN TIME PARTIAL: CPT | Performed by: STUDENT IN AN ORGANIZED HEALTH CARE EDUCATION/TRAINING PROGRAM

## 2023-07-28 PROCEDURE — 83921 ORGANIC ACID SINGLE QUANT: CPT | Performed by: STUDENT IN AN ORGANIZED HEALTH CARE EDUCATION/TRAINING PROGRAM

## 2023-07-28 PROCEDURE — 3074F PR MOST RECENT SYSTOLIC BLOOD PRESSURE < 130 MM HG: ICD-10-PCS | Mod: CPTII,S$GLB,, | Performed by: STUDENT IN AN ORGANIZED HEALTH CARE EDUCATION/TRAINING PROGRAM

## 2023-07-28 PROCEDURE — 85247 CLOT FACTOR VIII MULTIMETRIC: CPT | Performed by: STUDENT IN AN ORGANIZED HEALTH CARE EDUCATION/TRAINING PROGRAM

## 2023-07-28 PROCEDURE — 85245 CLOT FACTOR VIII VW RISTOCTN: CPT | Performed by: STUDENT IN AN ORGANIZED HEALTH CARE EDUCATION/TRAINING PROGRAM

## 2023-07-28 PROCEDURE — 85576 BLOOD PLATELET AGGREGATION: CPT | Performed by: STUDENT IN AN ORGANIZED HEALTH CARE EDUCATION/TRAINING PROGRAM

## 2023-07-28 PROCEDURE — 3078F PR MOST RECENT DIASTOLIC BLOOD PRESSURE < 80 MM HG: ICD-10-PCS | Mod: CPTII,S$GLB,, | Performed by: STUDENT IN AN ORGANIZED HEALTH CARE EDUCATION/TRAINING PROGRAM

## 2023-07-28 PROCEDURE — 3074F SYST BP LT 130 MM HG: CPT | Mod: CPTII,S$GLB,, | Performed by: STUDENT IN AN ORGANIZED HEALTH CARE EDUCATION/TRAINING PROGRAM

## 2023-07-28 PROCEDURE — 3008F BODY MASS INDEX DOCD: CPT | Mod: CPTII,S$GLB,, | Performed by: STUDENT IN AN ORGANIZED HEALTH CARE EDUCATION/TRAINING PROGRAM

## 2023-07-28 PROCEDURE — 85240 CLOT FACTOR VIII AHG 1 STAGE: CPT | Performed by: STUDENT IN AN ORGANIZED HEALTH CARE EDUCATION/TRAINING PROGRAM

## 2023-07-28 PROCEDURE — 1111F DSCHRG MED/CURRENT MED MERGE: CPT | Mod: CPTII,S$GLB,, | Performed by: STUDENT IN AN ORGANIZED HEALTH CARE EDUCATION/TRAINING PROGRAM

## 2023-07-28 PROCEDURE — 1159F MED LIST DOCD IN RCRD: CPT | Mod: CPTII,S$GLB,, | Performed by: STUDENT IN AN ORGANIZED HEALTH CARE EDUCATION/TRAINING PROGRAM

## 2023-07-28 PROCEDURE — 99214 PR OFFICE/OUTPT VISIT, EST, LEVL IV, 30-39 MIN: ICD-10-PCS | Mod: S$GLB,,, | Performed by: STUDENT IN AN ORGANIZED HEALTH CARE EDUCATION/TRAINING PROGRAM

## 2023-07-28 PROCEDURE — 99999 PR PBB SHADOW E&M-EST. PATIENT-LVL IV: CPT | Mod: PBBFAC,,, | Performed by: STUDENT IN AN ORGANIZED HEALTH CARE EDUCATION/TRAINING PROGRAM

## 2023-07-28 PROCEDURE — 82607 VITAMIN B-12: CPT | Performed by: STUDENT IN AN ORGANIZED HEALTH CARE EDUCATION/TRAINING PROGRAM

## 2023-07-28 PROCEDURE — 99999 PR PBB SHADOW E&M-EST. PATIENT-LVL IV: ICD-10-PCS | Mod: PBBFAC,,, | Performed by: STUDENT IN AN ORGANIZED HEALTH CARE EDUCATION/TRAINING PROGRAM

## 2023-07-28 PROCEDURE — 3078F DIAST BP <80 MM HG: CPT | Mod: CPTII,S$GLB,, | Performed by: STUDENT IN AN ORGANIZED HEALTH CARE EDUCATION/TRAINING PROGRAM

## 2023-07-28 PROCEDURE — 85390 FIBRINOLYSINS SCREEN I&R: CPT | Mod: 91 | Performed by: STUDENT IN AN ORGANIZED HEALTH CARE EDUCATION/TRAINING PROGRAM

## 2023-07-28 PROCEDURE — 96365 THER/PROPH/DIAG IV INF INIT: CPT

## 2023-07-28 PROCEDURE — 36415 COLL VENOUS BLD VENIPUNCTURE: CPT | Performed by: STUDENT IN AN ORGANIZED HEALTH CARE EDUCATION/TRAINING PROGRAM

## 2023-07-28 PROCEDURE — 85246 CLOT FACTOR VIII VW ANTIGEN: CPT | Performed by: STUDENT IN AN ORGANIZED HEALTH CARE EDUCATION/TRAINING PROGRAM

## 2023-07-28 PROCEDURE — 99214 OFFICE O/P EST MOD 30 MIN: CPT | Mod: S$GLB,,, | Performed by: STUDENT IN AN ORGANIZED HEALTH CARE EDUCATION/TRAINING PROGRAM

## 2023-07-28 PROCEDURE — 1160F RVW MEDS BY RX/DR IN RCRD: CPT | Mod: CPTII,S$GLB,, | Performed by: STUDENT IN AN ORGANIZED HEALTH CARE EDUCATION/TRAINING PROGRAM

## 2023-07-28 PROCEDURE — 85610 PROTHROMBIN TIME: CPT | Performed by: STUDENT IN AN ORGANIZED HEALTH CARE EDUCATION/TRAINING PROGRAM

## 2023-07-28 PROCEDURE — 85230 CLOT FACTOR VII PROCONVERTIN: CPT | Performed by: STUDENT IN AN ORGANIZED HEALTH CARE EDUCATION/TRAINING PROGRAM

## 2023-07-28 RX ORDER — HEPARIN 100 UNIT/ML
5 SYRINGE INTRAVENOUS
Status: DISCONTINUED | OUTPATIENT
Start: 2023-07-28 | End: 2023-07-28 | Stop reason: HOSPADM

## 2023-07-28 RX ORDER — SODIUM CHLORIDE 9 MG/ML
INJECTION, SOLUTION INTRAVENOUS CONTINUOUS
Status: DISCONTINUED | OUTPATIENT
Start: 2023-07-28 | End: 2023-07-28 | Stop reason: HOSPADM

## 2023-07-28 RX ORDER — SODIUM CHLORIDE 9 MG/ML
INJECTION, SOLUTION INTRAVENOUS CONTINUOUS
Status: CANCELLED | OUTPATIENT
Start: 2023-08-04

## 2023-07-28 RX ORDER — DIPHENHYDRAMINE HYDROCHLORIDE 50 MG/ML
50 INJECTION INTRAMUSCULAR; INTRAVENOUS ONCE AS NEEDED
Status: CANCELLED | OUTPATIENT
Start: 2023-08-04

## 2023-07-28 RX ORDER — HEPARIN 100 UNIT/ML
5 SYRINGE INTRAVENOUS
Status: CANCELLED | OUTPATIENT
Start: 2023-08-04

## 2023-07-28 RX ORDER — SODIUM CHLORIDE 0.9 % (FLUSH) 0.9 %
10 SYRINGE (ML) INJECTION
Status: CANCELLED | OUTPATIENT
Start: 2023-08-04

## 2023-07-28 RX ORDER — EPINEPHRINE 0.3 MG/.3ML
0.3 INJECTION SUBCUTANEOUS ONCE AS NEEDED
Status: CANCELLED | OUTPATIENT
Start: 2023-08-04

## 2023-07-28 RX ORDER — SODIUM CHLORIDE 0.9 % (FLUSH) 0.9 %
10 SYRINGE (ML) INJECTION
Status: DISCONTINUED | OUTPATIENT
Start: 2023-07-28 | End: 2023-07-28 | Stop reason: HOSPADM

## 2023-07-28 RX ADMIN — FERRIC CARBOXYMALTOSE INJECTION 750 MG: 50 INJECTION, SOLUTION INTRAVENOUS at 12:07

## 2023-07-28 RX ADMIN — SODIUM CHLORIDE: 9 INJECTION, SOLUTION INTRAVENOUS at 12:07

## 2023-07-28 NOTE — PLAN OF CARE
Vital Signs Stable, No apparent distress noted; Pt tolerated __Injectafer   24g piv removed;No bleeding,swelling or drainage noted to the site.  AVS/Discharge instructions given;all questions answered ;Pt verbalizes understanding.  Follow up appts scheduled and confirmed, sent via [employer]; ambulated from clinic in [facility], accompanied by mom

## 2023-07-28 NOTE — PROGRESS NOTES
Hematology- Oncology Clinic Note :         RFV / chief complaint- Iron deficiency anemia due to chronic blood loss        HPI  Pt is a 17 y.o. female who  has a past medical history of Abnormal uterine bleeding (AUB), ADD (attention deficit disorder with hyperactivity), Anemia, unspecified, Anxiety and depression, Encounter for blood transfusion, and Uterine fibroid.   Pt presents to the clinic today for anemia    C/o fatigue stable.   Pica- ice   Bleeding- HMB, fibroid. AUB x 4 months, has used TXA. Blood transfusion in March  Oral iron - causes constipation, taking stool softner  Appetite - good   Weight loss- none    ONCBCN ALL PLAN MEDICATIONS 4/13/2023   FERUMOXYTOL 510 MG/117 ML D5W (READY TO MIX SYSTEM)  mg     ONCBCN ALL PLAN MEDICATIONS 4/21/2023   FERUMOXYTOL 510 MG/117 ML D5W (READY TO MIX SYSTEM)  mg         Reviewed past medical/surgical/social history    Past Medical History:   Diagnosis Date    Abnormal uterine bleeding (AUB)     ADD (attention deficit disorder with hyperactivity)     Anemia, unspecified     Anxiety and depression     Encounter for blood transfusion     Uterine fibroid       Past Surgical History:   Procedure Laterality Date    DIAGNOSTIC LAPAROSCOPY N/A 3/21/2023    Procedure: LAPAROSCOPY, DIAGNOSTIC;  Surgeon: Katja Dang MD;  Location: [facility] OR;  Service: OB/GYN;  Laterality: N/A;    HYSTEROSCOPIC RESECTION OF MYOMA N/A 3/21/2023    Procedure: MYOMECTOMY, HYSTEROSCOPIC;  Surgeon: Katja Dang MD;  Location: [facility] OR;  Service: OB/GYN;  Laterality: N/A;      Review of patient's allergies indicates:   Allergen Reactions    Cherries      unknown    Lavender      unknown      Social History     Tobacco Use    Smoking status: Never    Smokeless tobacco: Never    Tobacco comments:     VAPING   Substance Use Topics    Alcohol use: Not Currently     Comment: SOCIAL      Family History   Problem Relation Age of Onset    No Known Problems Mother     Hypertension Father  "    Heart disease Father     Heart attacks under age 50 Neg Hx     Early death Neg Hx     Congenital heart disease Neg Hx     Breast cancer Neg Hx     Colon cancer Neg Hx     Ovarian cancer Neg Hx           Review of Systems :  Review of Systems   Constitutional:  Positive for malaise/fatigue. Negative for chills, diaphoresis, fever and weight loss.   HENT: Negative.  Negative for congestion, hearing loss, nosebleeds, sore throat and tinnitus.    Eyes: Negative.  Negative for blurred vision and discharge.   Respiratory:  Negative for cough, hemoptysis, sputum production, shortness of breath and wheezing.    Cardiovascular: Negative.  Negative for chest pain, palpitations and leg swelling.   Gastrointestinal: Negative.  Negative for abdominal pain, blood in stool, constipation, diarrhea, heartburn, melena, nausea and vomiting.   Genitourinary: Negative.    Musculoskeletal: Negative.  Negative for back pain, falls, joint pain and myalgias.   Skin: Negative.  Negative for itching and rash.   Neurological: Negative.  Negative for dizziness, tingling, sensory change, speech change, focal weakness, seizures, loss of consciousness, weakness and headaches.   Endo/Heme/Allergies: Negative.  Does not bruise/bleed easily.   Psychiatric/Behavioral: Negative.  Negative for depression. The patient is not nervous/anxious and does not have insomnia.              Physical Exam :  /78 (BP Location: Right arm, Patient Position: Sitting, BP Method: Small (Automatic))   Pulse 80   Temp 98.7 °F (37.1 °C) (Oral)   Resp 16   Ht 5' 3" (1.6 m)   Wt 48.9 kg (107 lb 12.9 oz)   LMP 11/22/2022 Comment: 7/12/23:  PATIENT REPORTS CONTINUOUS VAGINAL BLEEDING SINCE 11/22/2022  SpO2 97%   BMI 19.10 kg/m²   Wt Readings from Last 3 Encounters:   07/28/23 48.9 kg (107 lb 12.9 oz) (12 %, Z= -1.20)*   07/24/23 50.4 kg (111 lb 1.8 oz) (17 %, Z= -0.96)*   07/20/23 50.3 kg (110 lb 14.3 oz) (16 %, Z= -0.98)*     * Growth percentiles are based on " [facility] (Girls, 2-20 Years) data.       Body mass index is 19.1 kg/m².      Physical Exam  Constitutional:       General: She is not in acute distress.     Appearance: Normal appearance. She is not ill-appearing.   HENT:      Head: Normocephalic and atraumatic.      Right Ear: External ear normal.      Left Ear: External ear normal.   Eyes:      General: No scleral icterus.        Right eye: No discharge.         Left eye: No discharge.   Pulmonary:      Effort: Pulmonary effort is normal. No respiratory distress.   Skin:     Coloration: Skin is not jaundiced.      Findings: No erythema or rash.   Neurological:      Mental Status: She is alert and oriented to person, place, and time. Mental status is at baseline.   Psychiatric:         Mood and Affect: Mood normal.         Speech: Speech normal.         Behavior: Behavior normal.         Current Outpatient Medications   Medication Sig Dispense Refill    ferrous sulfate (FEOSOL) 325 mg (65 mg iron) Tab tablet Take 325 mg by mouth daily with breakfast.      ibuprofen (ADVIL,MOTRIN) 200 MG tablet Take 200 mg by mouth every 6 (six) hours as needed for Pain.      ondansetron (ZOFRAN) 8 MG tablet Take 8 mg by mouth 2 (two) times daily.      relugolix-estradiol-norethindr (MYFEMBREE) 40-1-0.5 mg Tab Take 1 tablet by mouth once daily. 84 tablet 3    sertraline (ZOLOFT) 25 MG tablet Take 1 tablet (25 mg total) by mouth once daily. 30 tablet 0    valACYclovir (VALTREX) 1000 MG tablet Take 1 tablet (1,000 mg total) by mouth once daily. for 5 days (Patient not taking: Reported on 7/24/2023) 5 tablet 3     No current facility-administered medications for this visit.       Pertinent Diagnostic studies:      Lab Visit on 07/26/2023   Component Date Value Ref Range Status    WBC 07/26/2023 4.18  3.90 - 12.70 K/uL Final    RBC 07/26/2023 5.19  4.00 - 5.40 M/uL Final    Hemoglobin 07/26/2023 13.4  12.0 - 16.0 g/dL Final    Hematocrit 07/26/2023 43.4  37.0 - 48.5 % Final    MCV 07/26/2023  84  82 - 98 fL Final    MCH 07/26/2023 25.8 (L)  27.0 - 31.0 pg Final    MCHC 07/26/2023 30.9 (L)  32.0 - 36.0 g/dL Final    RDW 07/26/2023 19.5 (H)  11.5 - 14.5 % Final    Platelets 07/26/2023 199  150 - 450 K/uL Final    MPV 07/26/2023 SEE COMMENT  9.2 - 12.9 fL Final    Result not available.    Immature Granulocytes 07/26/2023 0.2  0.0 - 0.5 % Final    Gran # (ANC) 07/26/2023 2.5  1.8 - 7.7 K/uL Final    Immature Grans (Abs) 07/26/2023 0.01  0.00 - 0.04 K/uL Final    Comment: Mild elevation in immature granulocytes is non specific and   can be seen in a variety of conditions including stress response,   acute inflammation, trauma and pregnancy. Correlation with other   laboratory and clinical findings is essential.      Lymph # 07/26/2023 1.2  1.0 - 4.8 K/uL Final    Mono # 07/26/2023 0.3  0.3 - 1.0 K/uL Final    Eos # 07/26/2023 0.1  0.0 - 0.5 K/uL Final    Baso # 07/26/2023 0.05  0.00 - 0.20 K/uL Final    nRBC 07/26/2023 0  0 /100 WBC Final    Gran % 07/26/2023 59.4  38.0 - 73.0 % Final    Lymph % 07/26/2023 29.2  18.0 - 48.0 % Final    Mono % 07/26/2023 7.4  4.0 - 15.0 % Final    Eosinophil % 07/26/2023 2.6  0.0 - 8.0 % Final    Basophil % 07/26/2023 1.2  0.0 - 1.9 % Final    Differential Method 07/26/2023 Automated   Final    Iron 07/26/2023 171 (H)  30 - 160 ug/dL Final    Transferrin 07/26/2023 318  200 - 375 mg/dL Final    TIBC 07/26/2023 471 (H)  250 - 450 ug/dL Final    Saturated Iron 07/26/2023 36  20 - 50 % Final    Ferritin 07/26/2023 55  20.0 - 300.0 ng/mL Final    Cholesterol 07/26/2023 189  120 - 199 mg/dL Final    Comment: The National Cholesterol Education Program (NCEP) has set the  following guidelines (reference ranges) for Cholesterol:  Optimal.....................<200 mg/dL  Borderline High.............200-239 mg/dL  High........................> or = 240 mg/dL      Triglycerides 07/26/2023 92  30 - 150 mg/dL Final    Comment: The National Cholesterol Education Program (NCEP) has set  the  following guidelines (reference values) for triglycerides:  Normal......................<150 mg/dL  Borderline High.............150-199 mg/dL  High........................200-499 mg/dL      HDL 07/26/2023 52  40 - 75 mg/dL Final    Comment: The National Cholesterol Education Program (NCEP) has set the  following guidelines (reference values) for HDL Cholesterol:  Low...............<40 mg/dL  Optimal...........>60 mg/dL      LDL Cholesterol 07/26/2023 118.6  63.0 - 159.0 mg/dL Final    Comment: The National Cholesterol Education Program (NCEP) has set the  following guidelines (reference values) for LDL Cholesterol:  Optimal.......................<130 mg/dL  Borderline High...............130-159 mg/dL  High..........................160-189 mg/dL  Very High.....................>190 mg/dL      HDL/Cholesterol Ratio 07/26/2023 27.5  20.0 - 50.0 % Final    Total Cholesterol/HDL Ratio 07/26/2023 3.6  2.0 - 5.0 Final    Non-HDL Cholesterol 07/26/2023 137  mg/dL Final    Comment: Risk category and Non-HDL cholesterol goals:  Coronary heart disease (CHD)or equivalent (10-year risk of CHD >20%):  Non-HDL cholesterol goal     <130 mg/dL  Two or more CHD risk factors and 10-year risk of CHD <= 20%:  Non-HDL cholesterol goal     <160 mg/dL  0 to 1 CHD risk factor:  Non-HDL cholesterol goal     <190 mg/dL      Hepatitis C Ab 07/26/2023 Non-reactive  Non-reactive Final    HIV 1/2 Ag/Ab 07/26/2023 Non-reactive  Non-reactive Final           Oncology History    No history exists.     Assessment :       1. Iron deficiency anemia due to chronic blood loss    2. Abnormal uterine bleeding (AUB)    3. Nutritional anemia        Plan :       Iron def anemia due to chronic blood loss . HMB  IV iron given in April . Plan for additional injectafer.   Labs from this visit reviewed. Hb 13. Will check for bleeding disorder incl VWD  Hand out on iron def and iron rich foods given to pt.   Continue f/u with obgyn for HMB    Discussed side  effects of Injectafer (or venofer) which include but are not limited to infusion reaction, shortness of breath, hives, rash, flushing, itching, headaches, skin discoloration at the injection site, nausea, vomiting, low phosphorus level, elevated blood pressure, elevated liver enzymes, risks to the unborn baby if pregnant. Patient understands the risks and agrees with proceeding with Injectafer (or venofer).     Route Chart for Scheduling  Med Onc Route Chart for Scheduling      Therapy Plan Information  INJECTAFER (FERRIC CARBOXYMALTOSE)  Medications  ferric carboxymaltose (INJECTAFER) 750 mg in sodium chloride 0.9% 265 mL infusion  750 mg, Intravenous, 1 time a week  IV Fluids  0.9%  NaCl infusion  Intravenous, 1 time a week  Anaphylaxis/Hypersensitivity  EPINEPHrine (EPIPEN) 0.3 mg/0.3 mL pen injection 0.3 mg  0.3 mg, Intramuscular, PRN  diphenhydrAMINE injection 50 mg  50 mg, Intravenous, PRN  hydrocortisone sodium succinate injection 100 mg  100 mg, Intravenous, PRN  Flushes  sodium chloride 0.9% flush 10 mL  10 mL, Intravenous, 1 time a week  heparin, porcine (PF) 100 unit/mL injection flush 500 Units  500 Units, Intravenous, 1 time a week  sodium chloride 0.9% 100 mL flush bag  Intravenous, 1 time a week    FERAHEME (FERUMOXYTOL) TWO DOSES  heparin, porcine (PF) 100 unit/mL injection flush 500 Units  500 Units, Intravenous, PRN  sodium chloride 0.9% flush 10 mL  10 mL, Intravenous, Every visit  sodium chloride 0.9% 100 mL flush bag  Intravenous, Every visit        Electronically signed by Regina Sanches    [facility]      Future Appointments   Date Time Provider Department Center   8/14/2023 11:30 AM NON-CHEMO 01 [facility]   8/14/2023  1:00 PM [facility] MRI1 350 LB LIMIT [facility] MRI [facility]   8/21/2023  8:30 AM Katja Dang MD [facility] [place]   10/5/2023  2:15 PM Wendy Prieto, OD [facility]         This note was created with voice recognition software.   Grammatical, syntax and spelling errors may be inevitable.

## 2023-07-28 NOTE — TELEPHONE ENCOUNTER
----- Message from Chris Scales sent at 7/28/2023 11:28 AM CDT -----  Regarding: LATE  Name of Who is Calling: YESENIA PIKE [[id number]]            What is the request in detail: Patient notifying they are running late but on the way; call if any changes occurs to appointment                Can the clinic reply by [facility]: no              What Number to Call Back if not in [facility]: [phone]

## 2023-07-31 LAB
FACT VII ACT/NOR PPP: 63 % (ref 65–155)
FACT VIII ACT/NOR PPP: 64 % (ref 55–200)
VON WILLEBRAND EVAL PPP-IMP: ABNORMAL
VON WILLEBRAND EVAL PPP-IMP: NORMAL
VWF AG ACT/NOR PPP IA: 55 % (ref 55–200)
VWF MULTIMERS PPP QL: NORMAL
VWF:AC ACT/NOR PPP IA: 45 % (ref 55–200)

## 2023-08-01 LAB — METHYLMALONATE SERPL-SCNC: 0.21 UMOL/L

## 2023-08-04 LAB
VON WILLEBRAND EVAL PPP-IMP: NORMAL
VWF MULTIMERS PPP QL: NORMAL
VWF:RCO ACT/NOR PPP PL AGG: NORMAL %

## 2023-08-06 LAB
PROVIDER SIGNING NAME: NORMAL
VON WILLEBRAND EVAL PPP-IMP: NORMAL

## 2023-08-14 ENCOUNTER — HOSPITAL ENCOUNTER (OUTPATIENT)
Dept: RADIOLOGY | Facility: OTHER | Age: 18
Discharge: HOME | End: 2023-08-14
Attending: STUDENT IN AN ORGANIZED HEALTH CARE EDUCATION/TRAINING PROGRAM
Payer: COMMERCIAL

## 2023-08-14 ENCOUNTER — OFFICE VISIT (OUTPATIENT)
Dept: HEMATOLOGY/ONCOLOGY | Facility: CLINIC | Age: 18
End: 2023-08-14
Payer: COMMERCIAL

## 2023-08-14 ENCOUNTER — INFUSION (OUTPATIENT)
Dept: INFUSION THERAPY | Facility: OTHER | Age: 18
End: 2023-08-14
Attending: STUDENT IN AN ORGANIZED HEALTH CARE EDUCATION/TRAINING PROGRAM
Payer: COMMERCIAL

## 2023-08-14 VITALS
SYSTOLIC BLOOD PRESSURE: 96 MMHG | WEIGHT: 112.63 LBS | OXYGEN SATURATION: 98 % | BODY MASS INDEX: 19.96 KG/M2 | TEMPERATURE: 99 F | HEIGHT: 63 IN | RESPIRATION RATE: 18 BRPM | HEART RATE: 93 BPM | DIASTOLIC BLOOD PRESSURE: 64 MMHG

## 2023-08-14 DIAGNOSIS — D68.00 VON WILLEBRAND DISEASE: ICD-10-CM

## 2023-08-14 DIAGNOSIS — N93.9 ABNORMAL UTERINE BLEEDING (AUB): ICD-10-CM

## 2023-08-14 DIAGNOSIS — D50.0 IRON DEFICIENCY ANEMIA DUE TO CHRONIC BLOOD LOSS: Primary | ICD-10-CM

## 2023-08-14 DIAGNOSIS — E53.8 LOW SERUM VITAMIN B12: ICD-10-CM

## 2023-08-14 LAB
BASOPHILS # BLD AUTO: 0.04 K/UL (ref 0–0.2)
BASOPHILS NFR BLD: 0.9 % (ref 0–1.9)
DIFFERENTIAL METHOD: ABNORMAL
EOSINOPHIL # BLD AUTO: 0.2 K/UL (ref 0–0.5)
EOSINOPHIL NFR BLD: 5 % (ref 0–8)
ERYTHROCYTE [DISTWIDTH] IN BLOOD BY AUTOMATED COUNT: 19.4 % (ref 11.5–14.5)
HCT VFR BLD AUTO: 41.5 % (ref 37–48.5)
HGB BLD-MCNC: 13.3 G/DL (ref 12–16)
IMM GRANULOCYTES # BLD AUTO: 0.01 K/UL (ref 0–0.04)
IMM GRANULOCYTES NFR BLD AUTO: 0.2 % (ref 0–0.5)
LYMPHOCYTES # BLD AUTO: 1.3 K/UL (ref 1–4.8)
LYMPHOCYTES NFR BLD: 29.7 % (ref 18–48)
MCH RBC QN AUTO: 27.9 PG (ref 27–31)
MCHC RBC AUTO-ENTMCNC: 32 G/DL (ref 32–36)
MCV RBC AUTO: 87 FL (ref 82–98)
MONOCYTES # BLD AUTO: 0.3 K/UL (ref 0.3–1)
MONOCYTES NFR BLD: 7.7 % (ref 4–15)
NEUTROPHILS # BLD AUTO: 2.5 K/UL (ref 1.8–7.7)
NEUTROPHILS NFR BLD: 56.5 % (ref 38–73)
NRBC BLD-RTO: 0 /100 WBC
PLATELET # BLD AUTO: 196 K/UL (ref 150–450)
PMV BLD AUTO: ABNORMAL FL (ref 9.2–12.9)
RBC # BLD AUTO: 4.76 M/UL (ref 4–5.4)
WBC # BLD AUTO: 4.41 K/UL (ref 3.9–12.7)

## 2023-08-14 PROCEDURE — 99999 PR PBB SHADOW E&M-EST. PATIENT-LVL IV: ICD-10-PCS | Mod: PBBFAC,,, | Performed by: STUDENT IN AN ORGANIZED HEALTH CARE EDUCATION/TRAINING PROGRAM

## 2023-08-14 PROCEDURE — 3074F PR MOST RECENT SYSTOLIC BLOOD PRESSURE < 130 MM HG: ICD-10-PCS | Mod: CPTII,S$GLB,, | Performed by: STUDENT IN AN ORGANIZED HEALTH CARE EDUCATION/TRAINING PROGRAM

## 2023-08-14 PROCEDURE — 99215 PR OFFICE/OUTPT VISIT, EST, LEVL V, 40-54 MIN: ICD-10-PCS | Mod: S$GLB,,, | Performed by: STUDENT IN AN ORGANIZED HEALTH CARE EDUCATION/TRAINING PROGRAM

## 2023-08-14 PROCEDURE — 1160F PR REVIEW ALL MEDS BY PRESCRIBER/CLIN PHARMACIST DOCUMENTED: ICD-10-PCS | Mod: CPTII,S$GLB,, | Performed by: STUDENT IN AN ORGANIZED HEALTH CARE EDUCATION/TRAINING PROGRAM

## 2023-08-14 PROCEDURE — 63600175 PHARM REV CODE 636 W HCPCS: Mod: JZ,JG | Performed by: STUDENT IN AN ORGANIZED HEALTH CARE EDUCATION/TRAINING PROGRAM

## 2023-08-14 PROCEDURE — 72197 MRI PELVIS W/O & W/DYE: CPT | Mod: 26,,, | Performed by: RADIOLOGY

## 2023-08-14 PROCEDURE — 96365 THER/PROPH/DIAG IV INF INIT: CPT

## 2023-08-14 PROCEDURE — 1160F RVW MEDS BY RX/DR IN RCRD: CPT | Mod: CPTII,S$GLB,, | Performed by: STUDENT IN AN ORGANIZED HEALTH CARE EDUCATION/TRAINING PROGRAM

## 2023-08-14 PROCEDURE — 72197 MRI FEMALE PELVIS W W/O CONTRAST: ICD-10-PCS | Mod: 26,,, | Performed by: RADIOLOGY

## 2023-08-14 PROCEDURE — 72197 MRI PELVIS W/O & W/DYE: CPT | Mod: TC

## 2023-08-14 PROCEDURE — 25500020 PHARM REV CODE 255: Performed by: STUDENT IN AN ORGANIZED HEALTH CARE EDUCATION/TRAINING PROGRAM

## 2023-08-14 PROCEDURE — 99215 OFFICE O/P EST HI 40 MIN: CPT | Mod: S$GLB,,, | Performed by: STUDENT IN AN ORGANIZED HEALTH CARE EDUCATION/TRAINING PROGRAM

## 2023-08-14 PROCEDURE — 3008F BODY MASS INDEX DOCD: CPT | Mod: CPTII,S$GLB,, | Performed by: STUDENT IN AN ORGANIZED HEALTH CARE EDUCATION/TRAINING PROGRAM

## 2023-08-14 PROCEDURE — 1159F PR MEDICATION LIST DOCUMENTED IN MEDICAL RECORD: ICD-10-PCS | Mod: CPTII,S$GLB,, | Performed by: STUDENT IN AN ORGANIZED HEALTH CARE EDUCATION/TRAINING PROGRAM

## 2023-08-14 PROCEDURE — 3078F DIAST BP <80 MM HG: CPT | Mod: CPTII,S$GLB,, | Performed by: STUDENT IN AN ORGANIZED HEALTH CARE EDUCATION/TRAINING PROGRAM

## 2023-08-14 PROCEDURE — 25000003 PHARM REV CODE 250: Performed by: STUDENT IN AN ORGANIZED HEALTH CARE EDUCATION/TRAINING PROGRAM

## 2023-08-14 PROCEDURE — 3078F PR MOST RECENT DIASTOLIC BLOOD PRESSURE < 80 MM HG: ICD-10-PCS | Mod: CPTII,S$GLB,, | Performed by: STUDENT IN AN ORGANIZED HEALTH CARE EDUCATION/TRAINING PROGRAM

## 2023-08-14 PROCEDURE — 1159F MED LIST DOCD IN RCRD: CPT | Mod: CPTII,S$GLB,, | Performed by: STUDENT IN AN ORGANIZED HEALTH CARE EDUCATION/TRAINING PROGRAM

## 2023-08-14 PROCEDURE — 99999 PR PBB SHADOW E&M-EST. PATIENT-LVL IV: CPT | Mod: PBBFAC,,, | Performed by: STUDENT IN AN ORGANIZED HEALTH CARE EDUCATION/TRAINING PROGRAM

## 2023-08-14 PROCEDURE — 3008F PR BODY MASS INDEX (BMI) DOCUMENTED: ICD-10-PCS | Mod: CPTII,S$GLB,, | Performed by: STUDENT IN AN ORGANIZED HEALTH CARE EDUCATION/TRAINING PROGRAM

## 2023-08-14 PROCEDURE — 3074F SYST BP LT 130 MM HG: CPT | Mod: CPTII,S$GLB,, | Performed by: STUDENT IN AN ORGANIZED HEALTH CARE EDUCATION/TRAINING PROGRAM

## 2023-08-14 PROCEDURE — A9585 GADOBUTROL INJECTION: HCPCS | Performed by: STUDENT IN AN ORGANIZED HEALTH CARE EDUCATION/TRAINING PROGRAM

## 2023-08-14 PROCEDURE — 85025 COMPLETE CBC W/AUTO DIFF WBC: CPT | Performed by: STUDENT IN AN ORGANIZED HEALTH CARE EDUCATION/TRAINING PROGRAM

## 2023-08-14 RX ORDER — EPINEPHRINE 0.3 MG/.3ML
0.3 INJECTION SUBCUTANEOUS ONCE AS NEEDED
Status: DISCONTINUED | OUTPATIENT
Start: 2023-08-14 | End: 2023-08-14 | Stop reason: HOSPADM

## 2023-08-14 RX ORDER — HEPARIN 100 UNIT/ML
5 SYRINGE INTRAVENOUS
Status: CANCELLED | OUTPATIENT
Start: 2023-08-14

## 2023-08-14 RX ORDER — EPINEPHRINE 0.3 MG/.3ML
0.3 INJECTION SUBCUTANEOUS ONCE AS NEEDED
Status: CANCELLED | OUTPATIENT
Start: 2023-08-14

## 2023-08-14 RX ORDER — HEPARIN 100 UNIT/ML
5 SYRINGE INTRAVENOUS
Status: DISCONTINUED | OUTPATIENT
Start: 2023-08-14 | End: 2023-08-14 | Stop reason: HOSPADM

## 2023-08-14 RX ORDER — SODIUM CHLORIDE 0.9 % (FLUSH) 0.9 %
10 SYRINGE (ML) INJECTION
Status: CANCELLED | OUTPATIENT
Start: 2023-08-14

## 2023-08-14 RX ORDER — SODIUM CHLORIDE 9 MG/ML
INJECTION, SOLUTION INTRAVENOUS CONTINUOUS
Status: CANCELLED | OUTPATIENT
Start: 2023-08-14

## 2023-08-14 RX ORDER — DIPHENHYDRAMINE HYDROCHLORIDE 50 MG/ML
50 INJECTION INTRAMUSCULAR; INTRAVENOUS ONCE AS NEEDED
Status: DISCONTINUED | OUTPATIENT
Start: 2023-08-14 | End: 2023-08-14 | Stop reason: HOSPADM

## 2023-08-14 RX ORDER — DIPHENHYDRAMINE HYDROCHLORIDE 50 MG/ML
50 INJECTION INTRAMUSCULAR; INTRAVENOUS ONCE AS NEEDED
Status: CANCELLED | OUTPATIENT
Start: 2023-08-14

## 2023-08-14 RX ORDER — GADOBUTROL 604.72 MG/ML
5 INJECTION INTRAVENOUS
Status: COMPLETED | OUTPATIENT
Start: 2023-08-14 | End: 2023-08-14

## 2023-08-14 RX ORDER — ACETAMINOPHEN AND PHENYLEPHRINE HCL 325; 5 MG/1; MG/1
500 TABLET ORAL DAILY
Qty: 30 TABLET | Refills: 11 | Status: SHIPPED | OUTPATIENT
Start: 2023-08-14 | End: 2023-09-26 | Stop reason: CLARIF

## 2023-08-14 RX ORDER — SODIUM CHLORIDE 0.9 % (FLUSH) 0.9 %
10 SYRINGE (ML) INJECTION
Status: DISCONTINUED | OUTPATIENT
Start: 2023-08-14 | End: 2023-08-14 | Stop reason: HOSPADM

## 2023-08-14 RX ORDER — SODIUM CHLORIDE 9 MG/ML
INJECTION, SOLUTION INTRAVENOUS CONTINUOUS
Status: DISCONTINUED | OUTPATIENT
Start: 2023-08-14 | End: 2023-08-14 | Stop reason: HOSPADM

## 2023-08-14 RX ADMIN — SODIUM CHLORIDE: 9 INJECTION, SOLUTION INTRAVENOUS at 12:08

## 2023-08-14 RX ADMIN — FERRIC CARBOXYMALTOSE INJECTION 750 MG: 50 INJECTION, SOLUTION INTRAVENOUS at 12:08

## 2023-08-14 RX ADMIN — GADOBUTROL 5 ML: 604.72 INJECTION INTRAVENOUS at 02:08

## 2023-08-14 NOTE — PROGRESS NOTES
Hematology- Oncology Clinic Note :         RFV / chief complaint- Iron deficiency anemia due to chronic blood loss        HPI  Pt is a 17 y.o. female who  has a past medical history of Abnormal uterine bleeding (AUB), ADD (attention deficit disorder with hyperactivity), Anemia, unspecified, Anxiety and depression, Encounter for blood transfusion, and Uterine fibroid.   Pt presents to the clinic today for anemia    C/o fatigue stable.   Bleeding- HMB, fibroid. AUB going on for almost a year with intermittent worsening.   Required Blood transfusion in March  birth control nuva ring, depo shot- did not help, made bleeding worse  myfembree - may be slightly better , not significant   TXA/ lysteda- Feb 2023 - did help somewhat but was continued beyond 5 days    Oral iron - causes constipation, taking stool softner  Tolerated injectafer in July 2023 - no issues    History of nose bleeding in past.   Father had to get cautery for nose bleeding. No known bleeding d/o in family     Appetite - good   Weight loss- none        Reviewed past medical/surgical/social history    Past Medical History:   Diagnosis Date    Abnormal uterine bleeding (AUB)     ADD (attention deficit disorder with hyperactivity)     Anemia, unspecified     Anxiety and depression     Encounter for blood transfusion     Uterine fibroid       Past Surgical History:   Procedure Laterality Date    DIAGNOSTIC LAPAROSCOPY N/A 3/21/2023    Procedure: LAPAROSCOPY, DIAGNOSTIC;  Surgeon: Katja Dang MD;  Location: [facility];  Service: OB/GYN;  Laterality: N/A;    HYSTEROSCOPIC RESECTION OF MYOMA N/A 3/21/2023    Procedure: MYOMECTOMY, HYSTEROSCOPIC;  Surgeon: Katja Dang MD;  Location: [facility];  Service: OB/GYN;  Laterality: N/A;      Review of patient's allergies indicates:   Allergen Reactions    Cherries      unknown    Lavender      unknown      Social History     Tobacco Use    Smoking status: Never    Smokeless tobacco: Never    Tobacco comments:  "    VAPING   Substance Use Topics    Alcohol use: Not Currently     Comment: SOCIAL      Family History   Problem Relation Age of Onset    No Known Problems Mother     Hypertension Father     Heart disease Father     Heart attacks under age 50 Neg Hx     Early death Neg Hx     Congenital heart disease Neg Hx     Breast cancer Neg Hx     Colon cancer Neg Hx     Ovarian cancer Neg Hx           Review of Systems :  Review of Systems   Constitutional:  Positive for malaise/fatigue. Negative for chills, diaphoresis, fever and weight loss.   HENT: Negative.  Negative for congestion, hearing loss, nosebleeds, sore throat and tinnitus.    Eyes: Negative.  Negative for blurred vision and discharge.   Respiratory:  Negative for cough, hemoptysis, sputum production, shortness of breath and wheezing.    Cardiovascular: Negative.  Negative for chest pain, palpitations and leg swelling.   Gastrointestinal: Negative.  Negative for abdominal pain, blood in stool, constipation, diarrhea, heartburn, melena, nausea and vomiting.   Genitourinary: Negative.    Musculoskeletal: Negative.  Negative for back pain, falls, joint pain and myalgias.   Skin: Negative.  Negative for itching and rash.   Neurological: Negative.  Negative for dizziness, tingling, sensory change, speech change, focal weakness, seizures, loss of consciousness, weakness and headaches.   Endo/Heme/Allergies: Negative.  Does not bruise/bleed easily.   Psychiatric/Behavioral: Negative.  Negative for depression. The patient is not nervous/anxious and does not have insomnia.                Physical Exam :  BP 96/64 (BP Location: Left arm, Patient Position: Sitting, BP Method: Small (Automatic))   Pulse 93   Temp 98.6 °F (37 °C) (Oral)   Resp 18   Ht 5' 3" (1.6 m)   Wt 51.1 kg (112 lb 10.5 oz)   SpO2 98%   BMI 19.96 kg/m²   Wt Readings from Last 3 Encounters:   08/14/23 51.1 kg (112 lb 10.5 oz) (19 %, Z= -0.86)*   07/28/23 48.9 kg (107 lb 12.9 oz) (12 %, Z= -1.20)* "   07/24/23 50.4 kg (111 lb 1.8 oz) (17 %, Z= -0.96)*     * Growth percentiles are based on CDC (Girls, 2-20 Years) data.       Body mass index is 19.96 kg/m².      Physical Exam  Constitutional:       General: She is not in acute distress.     Appearance: Normal appearance. She is not ill-appearing.   HENT:      Head: Normocephalic and atraumatic.      Right Ear: External ear normal.      Left Ear: External ear normal.   Eyes:      General: No scleral icterus.        Right eye: No discharge.         Left eye: No discharge.   Pulmonary:      Effort: Pulmonary effort is normal. No respiratory distress.   Skin:     Coloration: Skin is not jaundiced.      Findings: No erythema or rash.   Neurological:      Mental Status: She is alert and oriented to person, place, and time. Mental status is at baseline.   Psychiatric:         Mood and Affect: Mood normal.         Speech: Speech normal.         Behavior: Behavior normal.           Current Outpatient Medications   Medication Sig Dispense Refill    ferrous sulfate (FEOSOL) 325 mg (65 mg iron) Tab tablet Take 325 mg by mouth daily with breakfast.      ibuprofen (ADVIL,MOTRIN) 200 MG tablet Take 200 mg by mouth every 6 (six) hours as needed for Pain.      ondansetron (ZOFRAN) 8 MG tablet Take 8 mg by mouth 2 (two) times daily.      relugolix-estradiol-norethindr (MYFEMBREE) 40-1-0.5 mg Tab Take 1 tablet by mouth once daily. 84 tablet 3    sertraline (ZOLOFT) 25 MG tablet Take 1 tablet (25 mg total) by mouth once daily. 30 tablet 0    cyanocobalamin, vitamin B-12, 500 mcg Chew Take 500 mcg by mouth Daily. 30 tablet 11    valACYclovir (VALTREX) 1000 MG tablet Take 1 tablet (1,000 mg total) by mouth once daily. for 5 days (Patient not taking: Reported on 7/24/2023) 5 tablet 3     No current facility-administered medications for this visit.     Facility-Administered Medications Ordered in Other Visits   Medication Dose Route Frequency Provider Last Rate Last Admin    0.9%  NaCl  infusion   Intravenous Continuous Regina Sanches MD        diphenhydrAMINE injection 50 mg  50 mg Intravenous Once PRN Regina Sanches MD        EPINEPHrine (EPIPEN) 0.3 mg/0.3 mL pen injection 0.3 mg  0.3 mg Intramuscular Once PRN Regina Sanches MD        heparin, porcine (PF) 100 unit/mL injection flush 500 Units  5 mL Intravenous PRN Regina Sanches MD        hydrocortisone sodium succinate injection 100 mg  100 mg Intravenous Once PRN Regina Sanches MD        sodium chloride 0.9% 100 mL flush bag   Intravenous PRN Regina Sanches MD   Stopped at 08/14/23 1300    sodium chloride 0.9% flush 10 mL  10 mL Intravenous PRN Regina Sanches MD           Pertinent Diagnostic studies:      Infusion on 08/14/2023   Component Date Value Ref Range Status    WBC 08/14/2023 4.41  3.90 - 12.70 K/uL Final    RBC 08/14/2023 4.76  4.00 - 5.40 M/uL Final    Hemoglobin 08/14/2023 13.3  12.0 - 16.0 g/dL Final    Hematocrit 08/14/2023 41.5  37.0 - 48.5 % Final    MCV 08/14/2023 87  82 - 98 fL Final    MCH 08/14/2023 27.9  27.0 - 31.0 pg Final    MCHC 08/14/2023 32.0  32.0 - 36.0 g/dL Final    RDW 08/14/2023 19.4 (H)  11.5 - 14.5 % Final    Platelets 08/14/2023 196  150 - 450 K/uL Final    MPV 08/14/2023 SEE COMMENT  9.2 - 12.9 fL Final    Result not available.    Immature Granulocytes 08/14/2023 0.2  0.0 - 0.5 % Final    Gran # (ANC) 08/14/2023 2.5  1.8 - 7.7 K/uL Final    Immature Grans (Abs) 08/14/2023 0.01  0.00 - 0.04 K/uL Final    Comment: Mild elevation in immature granulocytes is non specific and   can be seen in a variety of conditions including stress response,   acute inflammation, trauma and pregnancy. Correlation with other   laboratory and clinical findings is essential.      Lymph # 08/14/2023 1.3  1.0 - 4.8 K/uL Final    Mono # 08/14/2023 0.3  0.3 - 1.0 K/uL Final    Eos # 08/14/2023 0.2  0.0 - 0.5 K/uL Final    Baso # 08/14/2023 0.04  0.00 - 0.20 K/uL Final    nRBC 08/14/2023 0  0 /100 WBC Final    Gran % 08/14/2023 56.5   38.0 - 73.0 % Final    Lymph % 08/14/2023 29.7  18.0 - 48.0 % Final    Mono % 08/14/2023 7.7  4.0 - 15.0 % Final    Eosinophil % 08/14/2023 5.0  0.0 - 8.0 % Final    Basophil % 08/14/2023 0.9  0.0 - 1.9 % Final    Differential Method 08/14/2023 Automated   Final           Oncology History    No history exists.     Assessment :       1. Iron deficiency anemia due to chronic blood loss    2. Abnormal uterine bleeding (AUB)    3. Von Willebrand disease    4. Low serum vitamin B12        Plan :       Iron def anemia due to chronic blood loss . HMB  IV iron given in April . Injectafer July Aug 2023  Labs from this visit reviewed. Hb 13. Stable. Will hold off on using desmopressin for now.    Hand out on iron def and iron rich foods given to pt.   Continue f/u with obgyn for HMB. MRI today to eval fibroids    Discussed side effects of Injectafer (or venofer) which include but are not limited to infusion reaction, shortness of breath, hives, rash, flushing, itching, headaches, skin discoloration at the injection site, nausea, vomiting, low phosphorus level, elevated blood pressure, elevated liver enzymes, risks to the unborn baby if pregnant. Patient understands the risks and agrees with proceeding with Injectafer (or venofer).       7/28/23-  No definite laboratory evidence of von Willebrand disease (VWD) but cannot exclude the possibility, see comments and suggest clinical correlation. Consider repeat testing if clinically indicated.   COMMENTS:  Borderline normal or reduced von Willebrand factor (VWF) antigen and/or VWF activity [latex immunoassay] and/or factor VIII activity.     vWD levels to be repeated in 3 months    Combined oral contraceptives are commonly used for heavy menstrual bleeding in VWD if the patient does not want to become pregnant. Avoiding TXA due to risk of thrombosis. Can consider IR embolization for fibroid causing bleeding. Will discuss with pt obgyn   Advised against regular use of NSAIDs which  can worsen bleeding     Low b12 - supplementation low dose advised       Route Chart for Scheduling  Med Onc Route Chart for Scheduling      Therapy Plan Information  INJECTAFER (FERRIC CARBOXYMALTOSE)  EPINEPHrine (EPIPEN) 0.3 mg/0.3 mL pen injection 0.3 mg  0.3 mg, Intramuscular, PRN  diphenhydrAMINE injection 50 mg  50 mg, Intravenous, PRN  hydrocortisone sodium succinate injection 100 mg  100 mg, Intravenous, PRN    FERAHEME (FERUMOXYTOL) TWO DOSES  heparin, porcine (PF) 100 unit/mL injection flush 500 Units  500 Units, Intravenous, PRN  sodium chloride 0.9% flush 10 mL  10 mL, Intravenous, Every visit  sodium chloride 0.9% 100 mL flush bag  Intravenous, Every visit      I spent >75 mins on reviewing epic chart notes, reviewing tests, nursing concerns,obtaining history, performing physical exam, counseling and educating patient/family/caregiver, documentation, independently interpreting results and discussing them with patient/family/caregiver, care coordination, ordering medications/ tests/ procedures and referring and communicating with other health care professionals.     Electronically signed by Regina Sanches    [facility] Future Appointments   Date Time Provider Department Center   8/21/2023  8:30 AM Katja Dang MD [facility] [place]   10/5/2023  2:15 PM Wendy Prieto OD [facility]         This note was created with voice recognition software.  Grammatical, syntax and spelling errors may be inevitable.

## 2023-08-14 NOTE — PLAN OF CARE
Vital Signs Stable, No apparent distress noted; Pt tolerated __Injectafer  w/o difficulty.  24g piv removed;No bleeding,swelling or drainage noted to the site.  AVS/Discharge instructions given;all questions answered ;Pt verbalizes understanding.  Follow up appts per mychart ; ambulated from clinic in NAD, accompanied by mom

## 2023-08-15 ENCOUNTER — PATIENT MESSAGE (OUTPATIENT)
Dept: OBSTETRICS AND GYNECOLOGY | Facility: CLINIC | Age: 18
End: 2023-08-15
Payer: COMMERCIAL

## 2023-08-15 DIAGNOSIS — D50.0 IRON DEFICIENCY ANEMIA DUE TO CHRONIC BLOOD LOSS: Primary | ICD-10-CM

## 2023-08-21 ENCOUNTER — OFFICE VISIT (OUTPATIENT)
Dept: OBSTETRICS AND GYNECOLOGY | Facility: CLINIC | Age: 18
End: 2023-08-21
Payer: COMMERCIAL

## 2023-08-21 DIAGNOSIS — D21.9 FIBROIDS: ICD-10-CM

## 2023-08-21 DIAGNOSIS — N93.9 ABNORMAL UTERINE BLEEDING (AUB): Primary | ICD-10-CM

## 2023-08-21 PROCEDURE — 99213 PR OFFICE/OUTPT VISIT, EST, LEVL III, 20-29 MIN: ICD-10-PCS | Mod: 95,,, | Performed by: STUDENT IN AN ORGANIZED HEALTH CARE EDUCATION/TRAINING PROGRAM

## 2023-08-21 PROCEDURE — 99213 OFFICE O/P EST LOW 20 MIN: CPT | Mod: 95,,, | Performed by: STUDENT IN AN ORGANIZED HEALTH CARE EDUCATION/TRAINING PROGRAM

## 2023-08-21 NOTE — PROGRESS NOTES
"Visit type: audiovisual  Total time spent with patient: 15 minutes    Each patient to whom he or she provides medical services by telemedicine is:  (1) informed of the relationship between the physician and patient and the respective role of any other health care provider with respect to management of the patient; and (2) notified that he or she may decline to receive medical services by telemedicine and may withdraw from such care at any time.      Chief Complaint: Follow up MRI     HPI:      Petra Medina is a 18 y.o.  who presents for follow up AUB/fibroids. Had MRI done with results as shown below. Her bleeding is slightly better on myfembree. She is still having daily bleeding but is using 1-3 pads per day instead of 3-5. She hasn't had a "flare" since having the myfembree in her system so unclear if that will be significantly better. She is having hot flashes. Her H/H has been WNL for about a month now. Per heme-onc, she is undergoing vWB testing again as one of her numbers was borderline. She also heard from Dr. Mendez, seeing him on .       ROS:     GENERAL: Denies fevers or chills. Feeling well overall.   ABDOMEN: Denies abdominal pain, constipation, diarrhea, nausea, vomiting, change in appetite.   URINARY: Denies frequency, dysuria, hematuria.  GYNECOLOGIC: See HPI.  NEUROLOGIC: Denies syncope or weakness.     Physical Exam:      PHYSICAL EXAM:  There were no vitals taken for this visit.  There is no height or weight on file to calculate BMI.     APPEARANCE: Well nourished, well developed, in no acute distress.  Exam deferred due to televisit    Results:     MRI Female Pelvis W W/O Contrast  Order: [id number]  Status: Final result     Visible to patient: Yes (seen)     Next appt: 2023 at 02:30 PM in Lab (LAB, SAME DAY [facility])     Dx: Abnormal uterine bleeding (AUB)     0 Result Notes    1 Patient Communication  Details    Reading Physician Reading Date Result Priority   Tacho, " Arpan MARQUEZ MD  [phone] 8/14/2023 Routine     Narrative & Impression  EXAMINATION:  MRI FEMALE PELVIS W W/O CONTRAST     CLINICAL HISTORY:  Abnormal uterine and vaginal bleeding, unspecifiedAbnormal uterine bleeding (AUB), US inconclusive;fibroids, eval for possible AVM;     TECHNIQUE:  T2-weighted images with fat saturation were obtained of the pelvis in sagittal and coronal planes. T2-weighted axial images were also obtained as well as coronal T1-weighted images and Haste multi-slice images.  5 cc Gadavist IV contrast was administered.     COMPARISON:  Ultrasound 07/11/2023     FINDINGS:  Uterus is anteverted and anteflexed. It measures 7.3 x 5.4 x 6.2 cm.  There is a 4.9 x 4.3 x 4.1 cm mass which protrudes into the endometrial canal.  This demonstrates heterogenous signal and relatively homogeneous postcontrast enhancement, noting what appears to be a central scar which may be due to recent myomectomy.  The endometrial stripe is not well delineated due to the presence of the large lesion.     Bilateral ovaries contain multiple normal appearing follicles..  No adnexal or pelvic masses.     Bladder is relatively decompressed and has normal appearance. No enlarged lymph nodes. There is no significant free fluid or drainable fluid collection.     Osseous structures have normal marrow signal characteristics.  Vascular structures show normal course and caliber.     Impression:     4.9 x 4.3 x 4.1 cm submucosal mass favors a large submucosal fibroid.  Presence of the mass does somewhat obscure delineation of the endometrial stripe.         Assessment/Plan:     Abnormal uterine bleeding (AUB)    Fibroids      -- Continue myfembree for now. Will follow up after her visit with Dr. Mendez.    Counseling:       Use of the [facility] Mobile Patient Portal discussed and encouraged during today's visit.       Katja Dang MD

## 2023-08-24 ENCOUNTER — PATIENT MESSAGE (OUTPATIENT)
Dept: FAMILY MEDICINE | Facility: CLINIC | Age: 18
End: 2023-08-24
Payer: COMMERCIAL

## 2023-08-24 DIAGNOSIS — F41.9 ANXIETY AND DEPRESSION: ICD-10-CM

## 2023-08-24 DIAGNOSIS — F32.A ANXIETY AND DEPRESSION: ICD-10-CM

## 2023-08-24 RX ORDER — SERTRALINE HYDROCHLORIDE 50 MG/1
50 TABLET, FILM COATED ORAL DAILY
Qty: 90 TABLET | Refills: 1 | Status: SHIPPED | OUTPATIENT
Start: 2023-08-24 | End: 2023-10-24 | Stop reason: SDUPTHER

## 2023-08-28 ENCOUNTER — TELEPHONE (OUTPATIENT)
Dept: INFUSION THERAPY | Facility: OTHER | Age: 18
End: 2023-08-28
Payer: COMMERCIAL

## 2023-08-29 ENCOUNTER — TELEPHONE (OUTPATIENT)
Dept: INFUSION THERAPY | Facility: OTHER | Age: 18
End: 2023-08-29
Payer: COMMERCIAL

## 2023-08-29 ENCOUNTER — LAB VISIT (OUTPATIENT)
Dept: LAB | Facility: HOSPITAL | Age: 18
End: 2023-08-29
Attending: STUDENT IN AN ORGANIZED HEALTH CARE EDUCATION/TRAINING PROGRAM
Payer: COMMERCIAL

## 2023-08-29 ENCOUNTER — PATIENT MESSAGE (OUTPATIENT)
Dept: OBSTETRICS AND GYNECOLOGY | Facility: CLINIC | Age: 18
End: 2023-08-29
Payer: COMMERCIAL

## 2023-08-29 DIAGNOSIS — N93.9 ABNORMAL UTERINE BLEEDING (AUB): Primary | ICD-10-CM

## 2023-08-29 DIAGNOSIS — D50.0 IRON DEFICIENCY ANEMIA DUE TO CHRONIC BLOOD LOSS: ICD-10-CM

## 2023-08-29 DIAGNOSIS — D21.9 FIBROIDS: ICD-10-CM

## 2023-08-29 LAB
BASOPHILS # BLD AUTO: 0.04 K/UL (ref 0–0.2)
BASOPHILS NFR BLD: 0.9 % (ref 0–1.9)
DIFFERENTIAL METHOD: ABNORMAL
EOSINOPHIL # BLD AUTO: 0.2 K/UL (ref 0–0.5)
EOSINOPHIL NFR BLD: 4 % (ref 0–8)
ERYTHROCYTE [DISTWIDTH] IN BLOOD BY AUTOMATED COUNT: 18 % (ref 11.5–14.5)
HCT VFR BLD AUTO: 32.7 % (ref 37–48.5)
HGB BLD-MCNC: 10.5 G/DL (ref 12–16)
IMM GRANULOCYTES # BLD AUTO: 0.01 K/UL (ref 0–0.04)
IMM GRANULOCYTES NFR BLD AUTO: 0.2 % (ref 0–0.5)
LYMPHOCYTES # BLD AUTO: 1.7 K/UL (ref 1–4.8)
LYMPHOCYTES NFR BLD: 35.3 % (ref 18–48)
MCH RBC QN AUTO: 28.3 PG (ref 27–31)
MCHC RBC AUTO-ENTMCNC: 32.1 G/DL (ref 32–36)
MCV RBC AUTO: 88 FL (ref 82–98)
MONOCYTES # BLD AUTO: 0.5 K/UL (ref 0.3–1)
MONOCYTES NFR BLD: 10 % (ref 4–15)
NEUTROPHILS # BLD AUTO: 2.3 K/UL (ref 1.8–7.7)
NEUTROPHILS NFR BLD: 49.6 % (ref 38–73)
NRBC BLD-RTO: 0 /100 WBC
PLATELET # BLD AUTO: 181 K/UL (ref 150–450)
PMV BLD AUTO: ABNORMAL FL (ref 9.2–12.9)
RBC # BLD AUTO: 3.71 M/UL (ref 4–5.4)
WBC # BLD AUTO: 4.7 K/UL (ref 3.9–12.7)

## 2023-08-29 PROCEDURE — 36415 COLL VENOUS BLD VENIPUNCTURE: CPT | Performed by: STUDENT IN AN ORGANIZED HEALTH CARE EDUCATION/TRAINING PROGRAM

## 2023-08-29 PROCEDURE — 85025 COMPLETE CBC W/AUTO DIFF WBC: CPT | Performed by: STUDENT IN AN ORGANIZED HEALTH CARE EDUCATION/TRAINING PROGRAM

## 2023-08-29 NOTE — TELEPHONE ENCOUNTER
Spoke with Ms Lambert. Infusion setup on the day she visit Dr. Sanches. She is requesting her labs to be drawn while she is in the infusion center. No other questions asked.

## 2023-08-30 RX ORDER — RELUGOLIX, ESTRADIOL HEMIHYDRATE, AND NORETHINDRONE ACETATE 40; 1; .5 MG/1; MG/1; MG/1
1 TABLET, FILM COATED ORAL DAILY
Qty: 84 TABLET | Refills: 3 | Status: SHIPPED | OUTPATIENT
Start: 2023-08-30 | End: 2024-08-29

## 2023-09-01 DIAGNOSIS — N93.9 ABNORMAL UTERINE BLEEDING (AUB): ICD-10-CM

## 2023-09-01 DIAGNOSIS — D50.0 IRON DEFICIENCY ANEMIA DUE TO CHRONIC BLOOD LOSS: Primary | ICD-10-CM

## 2023-09-04 ENCOUNTER — TELEPHONE (OUTPATIENT)
Dept: OBSTETRICS AND GYNECOLOGY | Facility: CLINIC | Age: 18
End: 2023-09-04
Payer: COMMERCIAL

## 2023-09-04 NOTE — TELEPHONE ENCOUNTER
Request Reference Number: [id number]. MYFEMBREE TAB is approved through 09/03/2024. For further questions, call [facility] Community Plan at [phone].          Let [place] know.

## 2023-09-05 ENCOUNTER — LAB VISIT (OUTPATIENT)
Dept: LAB | Facility: HOSPITAL | Age: 18
End: 2023-09-05
Attending: STUDENT IN AN ORGANIZED HEALTH CARE EDUCATION/TRAINING PROGRAM
Payer: COMMERCIAL

## 2023-09-05 DIAGNOSIS — D50.0 IRON DEFICIENCY ANEMIA DUE TO CHRONIC BLOOD LOSS: ICD-10-CM

## 2023-09-05 LAB
BASOPHILS # BLD AUTO: 0.02 K/UL (ref 0–0.2)
BASOPHILS NFR BLD: 0.4 % (ref 0–1.9)
DIFFERENTIAL METHOD: ABNORMAL
EOSINOPHIL # BLD AUTO: 0.2 K/UL (ref 0–0.5)
EOSINOPHIL NFR BLD: 4.7 % (ref 0–8)
ERYTHROCYTE [DISTWIDTH] IN BLOOD BY AUTOMATED COUNT: 17.2 % (ref 11.5–14.5)
HCT VFR BLD AUTO: 28 % (ref 37–48.5)
HGB BLD-MCNC: 8.7 G/DL (ref 12–16)
IMM GRANULOCYTES # BLD AUTO: 0.01 K/UL (ref 0–0.04)
IMM GRANULOCYTES NFR BLD AUTO: 0.2 % (ref 0–0.5)
LYMPHOCYTES # BLD AUTO: 1.6 K/UL (ref 1–4.8)
LYMPHOCYTES NFR BLD: 35 % (ref 18–48)
MCH RBC QN AUTO: 28.5 PG (ref 27–31)
MCHC RBC AUTO-ENTMCNC: 31.1 G/DL (ref 32–36)
MCV RBC AUTO: 92 FL (ref 82–98)
MONOCYTES # BLD AUTO: 0.4 K/UL (ref 0.3–1)
MONOCYTES NFR BLD: 7.8 % (ref 4–15)
NEUTROPHILS # BLD AUTO: 2.3 K/UL (ref 1.8–7.7)
NEUTROPHILS NFR BLD: 51.9 % (ref 38–73)
NRBC BLD-RTO: 0 /100 WBC
PLATELET # BLD AUTO: 207 K/UL (ref 150–450)
PMV BLD AUTO: 11.8 FL (ref 9.2–12.9)
RBC # BLD AUTO: 3.05 M/UL (ref 4–5.4)
WBC # BLD AUTO: 4.49 K/UL (ref 3.9–12.7)

## 2023-09-05 PROCEDURE — 36415 COLL VENOUS BLD VENIPUNCTURE: CPT | Performed by: STUDENT IN AN ORGANIZED HEALTH CARE EDUCATION/TRAINING PROGRAM

## 2023-09-05 PROCEDURE — 85025 COMPLETE CBC W/AUTO DIFF WBC: CPT | Performed by: STUDENT IN AN ORGANIZED HEALTH CARE EDUCATION/TRAINING PROGRAM

## 2023-09-11 ENCOUNTER — PATIENT MESSAGE (OUTPATIENT)
Dept: OBSTETRICS AND GYNECOLOGY | Facility: CLINIC | Age: 18
End: 2023-09-11
Payer: COMMERCIAL

## 2023-09-12 ENCOUNTER — LAB VISIT (OUTPATIENT)
Dept: LAB | Facility: OTHER | Age: 18
End: 2023-09-12
Attending: STUDENT IN AN ORGANIZED HEALTH CARE EDUCATION/TRAINING PROGRAM
Payer: COMMERCIAL

## 2023-09-12 ENCOUNTER — OFFICE VISIT (OUTPATIENT)
Dept: HEMATOLOGY/ONCOLOGY | Facility: CLINIC | Age: 18
End: 2023-09-12
Payer: COMMERCIAL

## 2023-09-12 VITALS
RESPIRATION RATE: 12 BRPM | OXYGEN SATURATION: 99 % | HEART RATE: 79 BPM | DIASTOLIC BLOOD PRESSURE: 59 MMHG | HEIGHT: 63 IN | WEIGHT: 114.88 LBS | SYSTOLIC BLOOD PRESSURE: 104 MMHG | BODY MASS INDEX: 20.36 KG/M2 | TEMPERATURE: 99 F

## 2023-09-12 DIAGNOSIS — N93.9 ABNORMAL UTERINE BLEEDING (AUB): ICD-10-CM

## 2023-09-12 DIAGNOSIS — N94.6 MENSTRUAL CRAMPS: ICD-10-CM

## 2023-09-12 DIAGNOSIS — D50.0 IRON DEFICIENCY ANEMIA DUE TO CHRONIC BLOOD LOSS: Primary | ICD-10-CM

## 2023-09-12 DIAGNOSIS — D50.0 IRON DEFICIENCY ANEMIA DUE TO CHRONIC BLOOD LOSS: ICD-10-CM

## 2023-09-12 DIAGNOSIS — E53.8 LOW SERUM VITAMIN B12: ICD-10-CM

## 2023-09-12 LAB
BASOPHILS # BLD AUTO: 0.02 K/UL (ref 0–0.2)
BASOPHILS NFR BLD: 0.6 % (ref 0–1.9)
DIFFERENTIAL METHOD: ABNORMAL
EOSINOPHIL # BLD AUTO: 0.1 K/UL (ref 0–0.5)
EOSINOPHIL NFR BLD: 3.6 % (ref 0–8)
ERYTHROCYTE [DISTWIDTH] IN BLOOD BY AUTOMATED COUNT: 15.7 % (ref 11.5–14.5)
HCT VFR BLD AUTO: 31.8 % (ref 37–48.5)
HGB BLD-MCNC: 9.9 G/DL (ref 12–16)
IMM GRANULOCYTES # BLD AUTO: 0.01 K/UL (ref 0–0.04)
IMM GRANULOCYTES NFR BLD AUTO: 0.3 % (ref 0–0.5)
LYMPHOCYTES # BLD AUTO: 1.2 K/UL (ref 1–4.8)
LYMPHOCYTES NFR BLD: 35.2 % (ref 18–48)
MCH RBC QN AUTO: 28.5 PG (ref 27–31)
MCHC RBC AUTO-ENTMCNC: 31.1 G/DL (ref 32–36)
MCV RBC AUTO: 92 FL (ref 82–98)
MONOCYTES # BLD AUTO: 0.3 K/UL (ref 0.3–1)
MONOCYTES NFR BLD: 9.4 % (ref 4–15)
NEUTROPHILS # BLD AUTO: 1.7 K/UL (ref 1.8–7.7)
NEUTROPHILS NFR BLD: 50.9 % (ref 38–73)
NRBC BLD-RTO: 0 /100 WBC
PLATELET # BLD AUTO: 196 K/UL (ref 150–450)
PMV BLD AUTO: 12.2 FL (ref 9.2–12.9)
RBC # BLD AUTO: 3.47 M/UL (ref 4–5.4)
WBC # BLD AUTO: 3.3 K/UL (ref 3.9–12.7)

## 2023-09-12 PROCEDURE — 3008F PR BODY MASS INDEX (BMI) DOCUMENTED: ICD-10-PCS | Mod: CPTII,S$GLB,, | Performed by: STUDENT IN AN ORGANIZED HEALTH CARE EDUCATION/TRAINING PROGRAM

## 2023-09-12 PROCEDURE — 99999 PR PBB SHADOW E&M-EST. PATIENT-LVL IV: ICD-10-PCS | Mod: PBBFAC,,, | Performed by: STUDENT IN AN ORGANIZED HEALTH CARE EDUCATION/TRAINING PROGRAM

## 2023-09-12 PROCEDURE — 3078F DIAST BP <80 MM HG: CPT | Mod: CPTII,S$GLB,, | Performed by: STUDENT IN AN ORGANIZED HEALTH CARE EDUCATION/TRAINING PROGRAM

## 2023-09-12 PROCEDURE — 36415 COLL VENOUS BLD VENIPUNCTURE: CPT | Performed by: STUDENT IN AN ORGANIZED HEALTH CARE EDUCATION/TRAINING PROGRAM

## 2023-09-12 PROCEDURE — 99999 PR PBB SHADOW E&M-EST. PATIENT-LVL IV: CPT | Mod: PBBFAC,,, | Performed by: STUDENT IN AN ORGANIZED HEALTH CARE EDUCATION/TRAINING PROGRAM

## 2023-09-12 PROCEDURE — 85240 CLOT FACTOR VIII AHG 1 STAGE: CPT | Performed by: STUDENT IN AN ORGANIZED HEALTH CARE EDUCATION/TRAINING PROGRAM

## 2023-09-12 PROCEDURE — 1160F RVW MEDS BY RX/DR IN RCRD: CPT | Mod: CPTII,S$GLB,, | Performed by: STUDENT IN AN ORGANIZED HEALTH CARE EDUCATION/TRAINING PROGRAM

## 2023-09-12 PROCEDURE — 85390 FIBRINOLYSINS SCREEN I&R: CPT | Performed by: STUDENT IN AN ORGANIZED HEALTH CARE EDUCATION/TRAINING PROGRAM

## 2023-09-12 PROCEDURE — 99215 OFFICE O/P EST HI 40 MIN: CPT | Mod: S$GLB,,, | Performed by: STUDENT IN AN ORGANIZED HEALTH CARE EDUCATION/TRAINING PROGRAM

## 2023-09-12 PROCEDURE — 85025 COMPLETE CBC W/AUTO DIFF WBC: CPT | Performed by: STUDENT IN AN ORGANIZED HEALTH CARE EDUCATION/TRAINING PROGRAM

## 2023-09-12 PROCEDURE — 3078F PR MOST RECENT DIASTOLIC BLOOD PRESSURE < 80 MM HG: ICD-10-PCS | Mod: CPTII,S$GLB,, | Performed by: STUDENT IN AN ORGANIZED HEALTH CARE EDUCATION/TRAINING PROGRAM

## 2023-09-12 PROCEDURE — 3008F BODY MASS INDEX DOCD: CPT | Mod: CPTII,S$GLB,, | Performed by: STUDENT IN AN ORGANIZED HEALTH CARE EDUCATION/TRAINING PROGRAM

## 2023-09-12 PROCEDURE — 3074F SYST BP LT 130 MM HG: CPT | Mod: CPTII,S$GLB,, | Performed by: STUDENT IN AN ORGANIZED HEALTH CARE EDUCATION/TRAINING PROGRAM

## 2023-09-12 PROCEDURE — 1159F MED LIST DOCD IN RCRD: CPT | Mod: CPTII,S$GLB,, | Performed by: STUDENT IN AN ORGANIZED HEALTH CARE EDUCATION/TRAINING PROGRAM

## 2023-09-12 PROCEDURE — 3074F PR MOST RECENT SYSTOLIC BLOOD PRESSURE < 130 MM HG: ICD-10-PCS | Mod: CPTII,S$GLB,, | Performed by: STUDENT IN AN ORGANIZED HEALTH CARE EDUCATION/TRAINING PROGRAM

## 2023-09-12 PROCEDURE — 99215 PR OFFICE/OUTPT VISIT, EST, LEVL V, 40-54 MIN: ICD-10-PCS | Mod: S$GLB,,, | Performed by: STUDENT IN AN ORGANIZED HEALTH CARE EDUCATION/TRAINING PROGRAM

## 2023-09-12 PROCEDURE — 1160F PR REVIEW ALL MEDS BY PRESCRIBER/CLIN PHARMACIST DOCUMENTED: ICD-10-PCS | Mod: CPTII,S$GLB,, | Performed by: STUDENT IN AN ORGANIZED HEALTH CARE EDUCATION/TRAINING PROGRAM

## 2023-09-12 PROCEDURE — 1159F PR MEDICATION LIST DOCUMENTED IN MEDICAL RECORD: ICD-10-PCS | Mod: CPTII,S$GLB,, | Performed by: STUDENT IN AN ORGANIZED HEALTH CARE EDUCATION/TRAINING PROGRAM

## 2023-09-12 RX ORDER — SODIUM CHLORIDE 0.9 % (FLUSH) 0.9 %
10 SYRINGE (ML) INJECTION
Status: CANCELLED | OUTPATIENT
Start: 2023-09-12

## 2023-09-12 RX ORDER — EPINEPHRINE 0.3 MG/.3ML
0.3 INJECTION SUBCUTANEOUS ONCE AS NEEDED
OUTPATIENT
Start: 2023-09-12

## 2023-09-12 RX ORDER — DIPHENHYDRAMINE HYDROCHLORIDE 50 MG/ML
50 INJECTION INTRAMUSCULAR; INTRAVENOUS ONCE AS NEEDED
OUTPATIENT
Start: 2023-09-12

## 2023-09-12 RX ORDER — SODIUM CHLORIDE 0.9 % (FLUSH) 0.9 %
10 SYRINGE (ML) INJECTION
OUTPATIENT
Start: 2023-09-12

## 2023-09-12 RX ORDER — TRAMADOL HYDROCHLORIDE 50 MG/1
50 TABLET ORAL EVERY 8 HOURS PRN
Qty: 20 TABLET | Refills: 0 | Status: SHIPPED | OUTPATIENT
Start: 2023-09-12

## 2023-09-12 RX ORDER — HEPARIN 100 UNIT/ML
5 SYRINGE INTRAVENOUS
OUTPATIENT
Start: 2023-09-12

## 2023-09-12 RX ORDER — HEPARIN 100 UNIT/ML
500 SYRINGE INTRAVENOUS
Status: CANCELLED | OUTPATIENT
Start: 2023-09-12

## 2023-09-12 RX ORDER — SODIUM CHLORIDE 9 MG/ML
INJECTION, SOLUTION INTRAVENOUS CONTINUOUS
OUTPATIENT
Start: 2023-09-12

## 2023-09-12 NOTE — Clinical Note
Please schedule iron infusions fereheme or injectafer.  Pt has surgery end of this month  RTC 5 weeks with labs 2 days prior  Cbc, iron, ferritin Number Of Freeze-Thaw Cycles: 3 freeze-thaw cycles Post-Care Instructions: I reviewed with the patient in detail post-care instructions. Patient is to wear sunprotection, and avoid picking at any of the treated lesions. Pt may apply Vaseline to crusted or scabbing areas. Show Applicator Variable?: Yes Duration Of Freeze Thaw-Cycle (Seconds): 0 Render Note In Bullet Format When Appropriate: No Detail Level: Detailed Consent: The patient's consent was obtained including but not limited to risks of crusting, scabbing, blistering, scarring, darker or lighter pigmentary change, recurrence, incomplete removal and infection.

## 2023-09-12 NOTE — PROGRESS NOTES
Hematology- Oncology Clinic Note :         RFV / chief complaint- Iron deficiency anemia due to chronic blood loss        HPI  Pt is a 18 y.o. female who  has a past medical history of Abnormal uterine bleeding (AUB), ADD (attention deficit disorder with hyperactivity), Anemia, unspecified, Anxiety and depression, Encounter for blood transfusion, and Uterine fibroid.   Pt presents to the clinic today for anemia    C/o fatigue stable.   Bleeding- HMB, fibroid. AUB going on for almost a year with intermittent worsening.   Required Blood transfusion in March  birth control nuva ring, depo shot- did not help, made bleeding worse  myfembree - may be slightly better , not significant   TXA/ lysteda- Feb 2023 - did help somewhat but was continued beyond 5 days    Oral iron - causes constipation, taking stool softner  Tolerated injectafer in July 2023 - no issues    History of nose bleeding in past.   Father had to get cautery for nose bleeding. No known bleeding d/o in family     Appetite - good   Weight loss- none        Reviewed past medical/surgical/social history    Past Medical History:   Diagnosis Date    Abnormal uterine bleeding (AUB)     ADD (attention deficit disorder with hyperactivity)     Anemia, unspecified     Anxiety and depression     Encounter for blood transfusion     Uterine fibroid       Past Surgical History:   Procedure Laterality Date    DIAGNOSTIC LAPAROSCOPY N/A 3/21/2023    Procedure: LAPAROSCOPY, DIAGNOSTIC;  Surgeon: Katja Dang MD;  Location: [facility];  Service: OB/GYN;  Laterality: N/A;    HYSTEROSCOPIC RESECTION OF MYOMA N/A 3/21/2023    Procedure: MYOMECTOMY, HYSTEROSCOPIC;  Surgeon: Katja Dang MD;  Location: [facility];  Service: OB/GYN;  Laterality: N/A;      Review of patient's allergies indicates:   Allergen Reactions    Cherries      unknown    Lavender      unknown      Social History     Tobacco Use    Smoking status: Never    Smokeless tobacco: Never    Tobacco comments:  "    VAPING   Substance Use Topics    Alcohol use: Not Currently     Comment: SOCIAL      Family History   Problem Relation Age of Onset    No Known Problems Mother     Hypertension Father     Heart disease Father     Heart attacks under age 50 Neg Hx     Early death Neg Hx     Congenital heart disease Neg Hx     Breast cancer Neg Hx     Colon cancer Neg Hx     Ovarian cancer Neg Hx           Review of Systems :  Review of Systems   Constitutional:  Positive for malaise/fatigue. Negative for chills, diaphoresis, fever and weight loss.   HENT: Negative.  Negative for congestion, hearing loss, nosebleeds, sore throat and tinnitus.    Eyes: Negative.  Negative for blurred vision and discharge.   Respiratory:  Negative for cough, hemoptysis, sputum production, shortness of breath and wheezing.    Cardiovascular: Negative.  Negative for chest pain, palpitations and leg swelling.   Gastrointestinal: Negative.  Negative for abdominal pain, blood in stool, constipation, diarrhea, heartburn, melena, nausea and vomiting.   Genitourinary: Negative.    Musculoskeletal: Negative.  Negative for back pain, falls, joint pain and myalgias.   Skin: Negative.  Negative for itching and rash.   Neurological: Negative.  Negative for dizziness, tingling, sensory change, speech change, focal weakness, seizures, loss of consciousness, weakness and headaches.   Endo/Heme/Allergies: Negative.  Does not bruise/bleed easily.   Psychiatric/Behavioral: Negative.  Negative for depression. The patient is not nervous/anxious and does not have insomnia.                Physical Exam :  BP (!) 104/59 (BP Location: Left arm, Patient Position: Sitting, BP Method: Medium (Automatic))   Pulse 79   Temp 98.5 °F (36.9 °C) (Oral)   Resp 12   Ht 5' 3" (1.6 m)   Wt 52.1 kg (114 lb 13.8 oz)   SpO2 99%   BMI 20.35 kg/m²   Wt Readings from Last 3 Encounters:   09/12/23 52.1 kg (114 lb 13.8 oz)   08/14/23 51.1 kg (112 lb 10.5 oz) (19 %, Z= -0.86)*   07/28/23 " 48.9 kg (107 lb 12.9 oz) (12 %, Z= -1.20)*     * Growth percentiles are based on CDC (Girls, 2-20 Years) data.       Body mass index is 20.35 kg/m².      Physical Exam  Constitutional:       General: She is not in acute distress.     Appearance: Normal appearance. She is not ill-appearing.   HENT:      Head: Normocephalic and atraumatic.      Right Ear: External ear normal.      Left Ear: External ear normal.   Eyes:      General: No scleral icterus.        Right eye: No discharge.         Left eye: No discharge.   Pulmonary:      Effort: Pulmonary effort is normal. No respiratory distress.   Skin:     Coloration: Skin is not jaundiced.      Findings: No erythema or rash.   Neurological:      Mental Status: She is alert and oriented to person, place, and time. Mental status is at baseline.   Psychiatric:         Mood and Affect: Mood normal.         Speech: Speech normal.         Behavior: Behavior normal.         Current Outpatient Medications   Medication Sig Dispense Refill    cyanocobalamin, vitamin B-12, 500 mcg Chew Take 500 mcg by mouth Daily. 30 tablet 11    ferrous sulfate (FEOSOL) 325 mg (65 mg iron) Tab tablet Take 325 mg by mouth daily with breakfast.      ibuprofen (ADVIL,MOTRIN) 200 MG tablet Take 200 mg by mouth every 6 (six) hours as needed for Pain.      ondansetron (ZOFRAN) 8 MG tablet Take 8 mg by mouth 2 (two) times daily.      relugolix-estradiol-norethindr (MYFEMBREE) 40-1-0.5 mg Tab Take 1 tablet by mouth once daily. 84 tablet 3    sertraline (ZOLOFT) 50 MG tablet Take 1 tablet (50 mg total) by mouth once daily. 90 tablet 1    traMADoL (ULTRAM) 50 mg tablet Take 1 tablet (50 mg total) by mouth every 8 (eight) hours as needed for Pain. 20 tablet 0    valACYclovir (VALTREX) 1000 MG tablet Take 1 tablet (1,000 mg total) by mouth once daily. for 5 days (Patient not taking: Reported on 7/24/2023) 5 tablet 3     No current facility-administered medications for this visit.       Pertinent Diagnostic  studies:      Lab Visit on 09/12/2023   Component Date Value Ref Range Status    WBC 09/12/2023 3.30 (L)  3.90 - 12.70 K/uL Final    RBC 09/12/2023 3.47 (L)  4.00 - 5.40 M/uL Final    Hemoglobin 09/12/2023 9.9 (L)  12.0 - 16.0 g/dL Final    Hematocrit 09/12/2023 31.8 (L)  37.0 - 48.5 % Final    MCV 09/12/2023 92  82 - 98 fL Final    MCH 09/12/2023 28.5  27.0 - 31.0 pg Final    MCHC 09/12/2023 31.1 (L)  32.0 - 36.0 g/dL Final    RDW 09/12/2023 15.7 (H)  11.5 - 14.5 % Final    Platelets 09/12/2023 196  150 - 450 K/uL Final    MPV 09/12/2023 12.2  9.2 - 12.9 fL Final    Immature Granulocytes 09/12/2023 0.3  0.0 - 0.5 % Final    Gran # (ANC) 09/12/2023 1.7 (L)  1.8 - 7.7 K/uL Final    Immature Grans (Abs) 09/12/2023 0.01  0.00 - 0.04 K/uL Final    Comment: Mild elevation in immature granulocytes is non specific and   can be seen in a variety of conditions including stress response,   acute inflammation, trauma and pregnancy. Correlation with other   laboratory and clinical findings is essential.      Lymph # 09/12/2023 1.2  1.0 - 4.8 K/uL Final    Mono # 09/12/2023 0.3  0.3 - 1.0 K/uL Final    Eos # 09/12/2023 0.1  0.0 - 0.5 K/uL Final    Baso # 09/12/2023 0.02  0.00 - 0.20 K/uL Final    nRBC 09/12/2023 0  0 /100 WBC Final    Gran % 09/12/2023 50.9  38.0 - 73.0 % Final    Lymph % 09/12/2023 35.2  18.0 - 48.0 % Final    Mono % 09/12/2023 9.4  4.0 - 15.0 % Final    Eosinophil % 09/12/2023 3.6  0.0 - 8.0 % Final    Basophil % 09/12/2023 0.6  0.0 - 1.9 % Final    Differential Method 09/12/2023 Automated   Final           Oncology History    No history exists.     Assessment :       1. Iron deficiency anemia due to chronic blood loss    2. Abnormal uterine bleeding (AUB)    3. Von Willebrand disease    4. Low serum vitamin B12    5. Menstrual cramps        Plan :       Iron def anemia due to chronic blood loss . HMB  IV iron given in April . Injectafer July Aug 2023  Hand out on iron def and iron rich foods given to pt  previously  Continue f/u with obgyn for HMB.     Discussed side effects of Injectafer (or venofer) which include but are not limited to infusion reaction, shortness of breath, hives, rash, flushing, itching, headaches, skin discoloration at the injection site, nausea, vomiting, low phosphorus level, elevated blood pressure, elevated liver enzymes, risks to the unborn baby if pregnant. Patient understands the risks and agrees with proceeding with Injectafer (or venofer).       7/28/23-  No definite laboratory evidence of von Willebrand disease (VWD) but cannot exclude the possibility, see comments and suggest clinical correlation. Consider repeat testing if clinically indicated.   COMMENTS:  Borderline normal or reduced von Willebrand factor (VWF) antigen and/or VWF activity [latex immunoassay] and/or factor VIII activity.   vWD levels repeated 9/2023- wnl.     Labs from this visit- anemia , will given additional iron infusions feraheme vs injectafer whichever is covered by insurance.   Pt is scheduled for robotic myomectomy end of the month       Low b12 - supplementation low dose advised       Route Chart for Scheduling  Med Onc Route Chart for Scheduling      Therapy Plan Information  INJECTAFER (FERRIC CARBOXYMALTOSE)  Medications  ferric carboxymaltose (INJECTAFER) 750 mg in sodium chloride 0.9% 265 mL infusion  750 mg, Intravenous, 1 time a week  IV Fluids  0.9%  NaCl infusion  Intravenous, 1 time a week  Anaphylaxis/Hypersensitivity  EPINEPHrine (EPIPEN) 0.3 mg/0.3 mL pen injection 0.3 mg  0.3 mg, Intramuscular, PRN  diphenhydrAMINE injection 50 mg  50 mg, Intravenous, PRN  hydrocortisone sodium succinate injection 100 mg  100 mg, Intravenous, PRN  Flushes  sodium chloride 0.9% flush 10 mL  10 mL, Intravenous, 1 time a week  heparin, porcine (PF) 100 unit/mL injection flush 500 Units  500 Units, Intravenous, 1 time a week  sodium chloride 0.9% 100 mL flush bag  Intravenous, 1 time a week    FERAHEME  (FERUMOXYTOL) TWO DOSES  Flushes  heparin, porcine (PF) 100 unit/mL injection flush 500 Units  500 Units, Intravenous, PRN  sodium chloride 0.9% flush 10 mL  10 mL, Intravenous, Every visit  sodium chloride 0.9% 100 mL flush bag  Intravenous, Every visit  5. Medications  ferumoxytoL (FERAHEME) 510 mg in dextrose 5 % (D5W) 100 mL IVPB  510 mg, Intravenous, Every visit      I spent >40 mins on reviewing epic chart notes, reviewing tests, nursing concerns,obtaining history, performing physical exam, counseling and educating patient/family/caregiver, documentation, independently interpreting results and discussing them with patient/family/caregiver, care coordination, ordering medications/ tests/ procedures and referring and communicating with other health care professionals.     Electronically signed by Regina Sanches    [facility] Appointments   Date Time Provider Department Center   9/26/2023 11:00 AM PRE-ADMIT, [facility] P[facility]   9/27/2023  8:30 AM Katja Dang MD [facility] [id number]   10/5/2023  2:15 PM Wendy Prieto, [facility]         This note was created with voice recognition software.  Grammatical, syntax and spelling errors may be inevitable.

## 2023-09-14 LAB
FACT VIII ACT/NOR PPP: 131 % (ref 55–200)
VON WILLEBRAND EVAL PPP-IMP: NORMAL
VWF AG ACT/NOR PPP IA: 89 % (ref 55–200)
VWF:AC ACT/NOR PPP IA: 86 % (ref 55–200)

## 2023-09-22 ENCOUNTER — TELEPHONE (OUTPATIENT)
Dept: INFUSION THERAPY | Facility: OTHER | Age: 18
End: 2023-09-22
Payer: COMMERCIAL

## 2023-09-22 NOTE — TELEPHONE ENCOUNTER
Spoke with patient and setup her iron infusion. States she has surgery on the 29th next week. No other questions asked at this time.

## 2023-09-26 ENCOUNTER — HOSPITAL ENCOUNTER (OUTPATIENT)
Dept: PREADMISSION TESTING | Facility: OTHER | Age: 18
Discharge: HOME | End: 2023-09-26
Attending: OBSTETRICS & GYNECOLOGY
Payer: COMMERCIAL

## 2023-09-26 ENCOUNTER — ANESTHESIA EVENT (OUTPATIENT)
Dept: SURGERY | Facility: OTHER | Age: 18
End: 2023-09-26
Payer: COMMERCIAL

## 2023-09-26 VITALS
DIASTOLIC BLOOD PRESSURE: 55 MMHG | SYSTOLIC BLOOD PRESSURE: 114 MMHG | BODY MASS INDEX: 19.49 KG/M2 | HEIGHT: 63 IN | WEIGHT: 110 LBS | OXYGEN SATURATION: 99 % | HEART RATE: 87 BPM

## 2023-09-26 DIAGNOSIS — Z01.818 PREOP TESTING: Primary | ICD-10-CM

## 2023-09-26 DIAGNOSIS — N93.8 DUB (DYSFUNCTIONAL UTERINE BLEEDING): ICD-10-CM

## 2023-09-26 LAB
BASOPHILS # BLD AUTO: 0.06 K/UL (ref 0–0.2)
BASOPHILS NFR BLD: 1.5 % (ref 0–1.9)
DIFFERENTIAL METHOD: ABNORMAL
EOSINOPHIL # BLD AUTO: 0.2 K/UL (ref 0–0.5)
EOSINOPHIL NFR BLD: 4.1 % (ref 0–8)
ERYTHROCYTE [DISTWIDTH] IN BLOOD BY AUTOMATED COUNT: 14.1 % (ref 11.5–14.5)
HCT VFR BLD AUTO: 38.7 % (ref 37–48.5)
HGB BLD-MCNC: 11.9 G/DL (ref 12–16)
IMM GRANULOCYTES # BLD AUTO: 0 K/UL (ref 0–0.04)
IMM GRANULOCYTES NFR BLD AUTO: 0 % (ref 0–0.5)
LYMPHOCYTES # BLD AUTO: 1.2 K/UL (ref 1–4.8)
LYMPHOCYTES NFR BLD: 29.3 % (ref 18–48)
MCH RBC QN AUTO: 28.2 PG (ref 27–31)
MCHC RBC AUTO-ENTMCNC: 30.7 G/DL (ref 32–36)
MCV RBC AUTO: 92 FL (ref 82–98)
MONOCYTES # BLD AUTO: 0.4 K/UL (ref 0.3–1)
MONOCYTES NFR BLD: 11.2 % (ref 4–15)
NEUTROPHILS # BLD AUTO: 2.1 K/UL (ref 1.8–7.7)
NEUTROPHILS NFR BLD: 53.9 % (ref 38–73)
NRBC BLD-RTO: 0 /100 WBC
PLATELET # BLD AUTO: 178 K/UL (ref 150–450)
PMV BLD AUTO: ABNORMAL FL (ref 9.2–12.9)
RBC # BLD AUTO: 4.22 M/UL (ref 4–5.4)
WBC # BLD AUTO: 3.92 K/UL (ref 3.9–12.7)

## 2023-09-26 PROCEDURE — 85025 COMPLETE CBC W/AUTO DIFF WBC: CPT | Performed by: ANESTHESIOLOGY

## 2023-09-26 PROCEDURE — 36415 COLL VENOUS BLD VENIPUNCTURE: CPT | Performed by: ANESTHESIOLOGY

## 2023-09-26 RX ORDER — ACETAMINOPHEN 500 MG
1000 TABLET ORAL
Status: CANCELLED | OUTPATIENT
Start: 2023-09-26 | End: 2023-09-26

## 2023-09-26 RX ORDER — LIDOCAINE HYDROCHLORIDE 10 MG/ML
0.5 INJECTION, SOLUTION EPIDURAL; INFILTRATION; INTRACAUDAL; PERINEURAL ONCE
Status: CANCELLED | OUTPATIENT
Start: 2023-09-26 | End: 2023-09-26

## 2023-09-26 RX ORDER — SODIUM CHLORIDE, SODIUM LACTATE, POTASSIUM CHLORIDE, CALCIUM CHLORIDE 600; 310; 30; 20 MG/100ML; MG/100ML; MG/100ML; MG/100ML
INJECTION, SOLUTION INTRAVENOUS CONTINUOUS
Status: CANCELLED | OUTPATIENT
Start: 2023-09-26

## 2023-09-26 NOTE — DISCHARGE INSTRUCTIONS
Information to Prepare you for your Surgery    PRE-ADMIT TESTING -  [phone]    [address]          Your surgery has been scheduled at [facility]. We are pleased to have the opportunity to serve you. For Further Information please call [phone].    On the day of surgery please report to the Information Desk on the 1st floor.    CONTACT YOUR PHYSICIAN'S OFFICE THE DAY PRIOR TO YOUR SURGERY TO OBTAIN YOUR ARRIVAL TIME.     The evening before surgery do not eat anything after 9 p.m. ( this includes hard candy, chewing gum and mints).  You may only have GATORADE, POWERADE AND WATER  from 9 p.m. until you leave your home.   DO NOT DRINK ANY LIQUIDS ON THE WAY TO THE HOSPITAL.      Why does your anesthesiologist allow you to drink Gatorade/Powerade before surgery?  Gatorade/Powerade helps to increase your comfort before surgery and to decrease your nausea after surgery. The carbohydrates in Gatorade/Powerade help reduce your body's stress response to surgery.  If you are a diabetic-drink only water prior to surgery.    Outpatient Surgery- May allow 2 adult Support Persons (1 being the designated ) for all surgical/procedural patients. A breastfeeding mother will be allowed her infant and 2 adult Support Persons.       SPECIAL MEDICATION INSTRUCTIONS: TAKE medications checked off by the Anesthesiologist on your Medication List.    Angiogram Patients: Take medications as instructed by your physician, including aspirin.     Surgery Patients:    If you take ASPIRIN - Your PHYSICIAN/SURGEON will need to inform you IF/OR when you need to stop taking aspirin prior to your surgery.     The week prior to surgery do not ot take any medications containing IBUPROFEN or NSAIDS ( Advil, Motrin, Goodys, BC, Aleve, Naproxen etc) If you are not sure if you should take a medicine please call your surgeon's office.  Ok to take Tylenol    Do Not Wear any  make-up (especially eye make-up) to surgery. Please remove any false eyelashes or eyelash extensions. If you arrive the day of surgery with makeup/eyelashes on you will be required to remove prior to surgery. (There is a risk of corneal abrasions if eye makeup/eyelash extensions are not removed)      Leave all valuables at home.   Do Not wear any jewelry or watches, including any metal in body piercings. Jewelry must be removed prior to coming to the hospital.  There is a possibility that rings that are unable to be removed may be cut off if they are on the surgical extremity.    Please remove all hair extensions, wigs, clips and any other metal accessories/ ornaments from your hair.  These items may pose a flammable/fire risk in Surgery and must be removed.    Do not shave your surgical area at least 5 days prior to your surgery. The surgical prep will be performed at the hospital according to Infection Control regulations.    Contact Lens must be removed before surgery. Either do not wear the contact lens or bring a case and solution for storage.  Please bring a container for eyeglasses or dentures as required.  Bring any paperwork your physician has provided, such as consent forms,  history and physicals, doctor's orders, etc.   Bring comfortable clothes that are loose fitting to wear upon discharge. Take into consideration the type of surgery being performed.  Maintain your diet as advised per your physician the day prior to surgery.      Adequate rest the night before surgery is advised.   Park in the Parking lot behind the hospital or in the [place] [facility] across the street from the parking lot. Parking is complimentary.  If you will be discharged the same day as your procedure, please arrange for a responsible adult to drive you home or to accompany you if traveling by taxi.   YOU WILL NOT BE PERMITTED TO DRIVE OR TO LEAVE THE HOSPITAL ALONE AFTER SURGERY.   If you are being discharged the same day,  it is strongly recommended that you arrange for someone to remain with you for the first 24 hrs following your surgery.    The Surgeon will speak to your family/visitor after your surgery regarding the outcome of your surgery and post op care.  The Surgeon may speak to you after your surgery, but there is a possibility you may not remember the details.  Please check with your family members regarding the conversation with the Surgeon.    We strongly recommend whoever is bringing you home be present for discharge instructions.  This will ensure a thorough understanding for your post op home care.          Thank you for your cooperation.  The Staff of [facility].            Bathing Instructions with Hibiclens    Shower the evening before and morning of your procedure with Chlorhexidine (Hibiclens)  do not use Chlorhexidine on your face or genitals. Do not get in your eyes.  Wash your face with water and your regular face wash/soap  Use your regular shampoo  Apply Chlorhexidine (Hibiclens) directly on your skin or on a wet washcloth and wash gently. When showering: Move away from the shower stream when applying Chlorhexidine (Hibiclens) to avoid rinsing off too soon.  Rinse thoroughly with warm water  Do not dilute Chlorhexidine (Hibiclens)   Dry off as usual, do not use any deodorant, powder, body lotions, perfume, after shave or cologne.

## 2023-09-26 NOTE — ANESTHESIA PREPROCEDURE EVALUATION
09/26/2023  Petra Medina is a 18 y.o., female.      Pre-op Assessment    I have reviewed the Patient Summary Reports.     I have reviewed the Nursing Notes. I have reviewed the NPO Status.   I have reviewed the Medications.     Review of Systems  Anesthesia Hx:  Denies Family Hx of Anesthesia complications.   Denies Personal Hx of Anesthesia complications.   Social:  Non-Smoker    Hematology/Oncology:     Oncology Normal    -- Anemia: Hematology Comments: Last Hb 11.9    Cardiovascular:  Cardiovascular Normal Exercise tolerance: good  H/o syncope associated symptomatic anemia   Pulmonary:  Pulmonary Normal    Renal/:  Renal/ Normal     Hepatic/GI:  Hepatic/GI Normal    Neurological:   Headaches    Endocrine:  Endocrine Normal    Psych:   Psychiatric History anxiety depression          Physical Exam  General: Well nourished, Cooperative, Alert and Oriented    Airway:  Mallampati: II   Mouth Opening: Normal  TM Distance: Normal  Tongue: Normal  Neck ROM: Normal ROM    Dental:  Intact, Retainer        Anesthesia Plan  Type of Anesthesia, risks & benefits discussed:    Anesthesia Type: Gen ETT  Intra-op Monitoring Plan: Standard ASA Monitors  Post Op Pain Control Plan: multimodal analgesia  Induction:  IV  Airway Plan: Video, Post-Induction  Informed Consent: Informed consent signed with the Patient and all parties understand the risks and agree with anesthesia plan.  All questions answered.   ASA Score: 2    Ready For Surgery From Anesthesia Perspective.     .

## 2023-09-27 ENCOUNTER — OFFICE VISIT (OUTPATIENT)
Dept: OBSTETRICS AND GYNECOLOGY | Facility: CLINIC | Age: 18
End: 2023-09-27
Payer: COMMERCIAL

## 2023-09-27 DIAGNOSIS — N93.9 ABNORMAL UTERINE BLEEDING (AUB): Primary | ICD-10-CM

## 2023-09-27 DIAGNOSIS — D21.9 FIBROIDS: ICD-10-CM

## 2023-09-27 DIAGNOSIS — D50.0 IRON DEFICIENCY ANEMIA DUE TO CHRONIC BLOOD LOSS: ICD-10-CM

## 2023-09-27 PROCEDURE — 99213 PR OFFICE/OUTPT VISIT, EST, LEVL III, 20-29 MIN: ICD-10-PCS | Mod: 95,,, | Performed by: STUDENT IN AN ORGANIZED HEALTH CARE EDUCATION/TRAINING PROGRAM

## 2023-09-27 PROCEDURE — 99213 OFFICE O/P EST LOW 20 MIN: CPT | Mod: 95,,, | Performed by: STUDENT IN AN ORGANIZED HEALTH CARE EDUCATION/TRAINING PROGRAM

## 2023-09-27 NOTE — PROGRESS NOTES
Visit type: audiovisual  Total time spent with patient: 15 minutes    Each patient to whom he or she provides medical services by telemedicine is:  (1) informed of the relationship between the physician and patient and the respective role of any other health care provider with respect to management of the patient; and (2) notified that he or she may decline to receive medical services by telemedicine and may withdraw from such care at any time.      Chief Complaint: Pre-op discussion     HPI:      Petra Medina is a 18 y.o.  who presents to discuss surgery scheduled for . She is having a robotic myomectomy with Dr. Mendez on Friday. I will be there as well. She just wanted to touch base and make sure the team is all on the same page. No new symptoms.    ROS:     GENERAL: Denies fevers or chills. Feeling well overall.   ABDOMEN: Denies abdominal pain, constipation, diarrhea, nausea, vomiting, change in appetite.     Physical Exam:      PHYSICAL EXAM:  LMP 11/15/2022   There is no height or weight on file to calculate BMI.     APPEARANCE: Well nourished, well developed, in no acute distress.  Exam deferred due to televisit      Assessment/Plan:     Abnormal uterine bleeding (AUB)    Fibroids    Iron deficiency anemia due to chronic blood loss      -- All questions answered. To OR as planned.      Katja Dang MD

## 2023-09-28 ENCOUNTER — INFUSION (OUTPATIENT)
Dept: INFUSION THERAPY | Facility: OTHER | Age: 18
End: 2023-09-28
Attending: STUDENT IN AN ORGANIZED HEALTH CARE EDUCATION/TRAINING PROGRAM
Payer: COMMERCIAL

## 2023-09-28 VITALS
OXYGEN SATURATION: 98 % | SYSTOLIC BLOOD PRESSURE: 108 MMHG | HEART RATE: 85 BPM | RESPIRATION RATE: 16 BRPM | DIASTOLIC BLOOD PRESSURE: 66 MMHG | TEMPERATURE: 99 F

## 2023-09-28 DIAGNOSIS — D50.0 IRON DEFICIENCY ANEMIA DUE TO CHRONIC BLOOD LOSS: Primary | ICD-10-CM

## 2023-09-28 PROCEDURE — 63600175 PHARM REV CODE 636 W HCPCS: Mod: JZ,JG | Performed by: STUDENT IN AN ORGANIZED HEALTH CARE EDUCATION/TRAINING PROGRAM

## 2023-09-28 PROCEDURE — 96374 THER/PROPH/DIAG INJ IV PUSH: CPT

## 2023-09-28 PROCEDURE — 25000003 PHARM REV CODE 250: Performed by: STUDENT IN AN ORGANIZED HEALTH CARE EDUCATION/TRAINING PROGRAM

## 2023-09-28 RX ORDER — HEPARIN 100 UNIT/ML
500 SYRINGE INTRAVENOUS
Status: DISCONTINUED | OUTPATIENT
Start: 2023-09-28 | End: 2023-09-28 | Stop reason: HOSPADM

## 2023-09-28 RX ORDER — HEPARIN 100 UNIT/ML
500 SYRINGE INTRAVENOUS
OUTPATIENT
Start: 2023-09-28

## 2023-09-28 RX ORDER — SODIUM CHLORIDE 0.9 % (FLUSH) 0.9 %
10 SYRINGE (ML) INJECTION
OUTPATIENT
Start: 2023-09-28

## 2023-09-28 RX ORDER — SODIUM CHLORIDE 0.9 % (FLUSH) 0.9 %
10 SYRINGE (ML) INJECTION
Status: DISCONTINUED | OUTPATIENT
Start: 2023-09-28 | End: 2023-09-28 | Stop reason: HOSPADM

## 2023-09-28 RX ADMIN — SODIUM CHLORIDE: 9 INJECTION, SOLUTION INTRAVENOUS at 01:09

## 2023-09-28 RX ADMIN — FERUMOXYTOL 510 MG: 510 INJECTION INTRAVENOUS at 01:09

## 2023-09-28 NOTE — H&P
History & Physical    SUBJECTIVE:     History of Present Illness:  Patient is a 18 y.o. female presents with large uterine fibroid causing severe anemia. Pt desires surgical management as medical treatment has failed.      No chief complaint on file.      Review of patient's allergies indicates:   Allergen Reactions    Lavender Other (See Comments)     Red face / eyes bloodshot /     Cherries Hives, Itching and Rash       No current facility-administered medications for this encounter.     Current Outpatient Medications   Medication Sig Dispense Refill    ferrous sulfate (FEOSOL) 325 mg (65 mg iron) Tab tablet Take 325 mg by mouth daily with breakfast.      ibuprofen (ADVIL,MOTRIN) 200 MG tablet Take 200 mg by mouth every 6 (six) hours as needed for Pain.      ondansetron (ZOFRAN) 8 MG tablet Take 8 mg by mouth 2 (two) times daily.      relugolix-estradiol-norethindr (MYFEMBREE) 40-1-0.5 mg Tab Take 1 tablet by mouth once daily. 84 tablet 3    sertraline (ZOLOFT) 50 MG tablet Take 1 tablet (50 mg total) by mouth once daily. 90 tablet 1    traMADoL (ULTRAM) 50 mg tablet Take 1 tablet (50 mg total) by mouth every 8 (eight) hours as needed for Pain. 20 tablet 0    valACYclovir (VALTREX) 1000 MG tablet Take 1 tablet (1,000 mg total) by mouth once daily. for 5 days 5 tablet 3       Past Medical History:   Diagnosis Date    Abnormal uterine bleeding (AUB)     ADD (attention deficit disorder with hyperactivity)     Anemia, unspecified     Anxiety and depression     Encounter for blood transfusion     Uterine fibroid      Past Surgical History:   Procedure Laterality Date    DIAGNOSTIC LAPAROSCOPY N/A 03/21/2023    Procedure: LAPAROSCOPY, DIAGNOSTIC;  Surgeon: Katja Dang MD;  Location: [facility] OR;  Service: OB/GYN;  Laterality: N/A;    HYSTEROSCOPIC RESECTION OF MYOMA N/A 03/21/2023    Procedure: MYOMECTOMY, HYSTEROSCOPIC;  Surgeon: Katja Dang MD;  Location: [facility] OR;  Service: OB/GYN;  Laterality: N/A;     TYMPANOSTOMY TUBE PLACEMENT       Family History   Problem Relation Age of Onset    No Known Problems Mother     Hypertension Father     Heart disease Father     Heart attacks under age 50 Neg Hx     Early death Neg Hx     Congenital heart disease Neg Hx     Breast cancer Neg Hx     Colon cancer Neg Hx     Ovarian cancer Neg Hx      Social History     Tobacco Use    Smoking status: Never    Smokeless tobacco: Never    Tobacco comments:     VAPING   Substance Use Topics    Alcohol use: Not Currently     Comment: SOCIAL    Drug use: No        Review of Systems:       OBJECTIVE:     Vital Signs (Most Recent)              Physical Exam:       Laboratory  CBC reviewed, hgb 11.9 9/26/23    Diagnostic Results:  MRI reviewed    ASSESSMENT/PLAN:     17yo G0 with large type 2-5 fibroid causing severe anemia    PLAN:Plan     Robotic myomectomy

## 2023-09-28 NOTE — PLAN OF CARE
Patient tolerated her Feraheme infusions. Reports no discomfort. VSS. NAD. Discussed discharge instructions. Verbalized understanding. No other questions. Patient discharged to home with mother.

## 2023-09-29 ENCOUNTER — ANESTHESIA (OUTPATIENT)
Dept: SURGERY | Facility: OTHER | Age: 18
End: 2023-09-29
Payer: COMMERCIAL

## 2023-09-29 ENCOUNTER — HOSPITAL ENCOUNTER (OUTPATIENT)
Facility: OTHER | Age: 18
Discharge: HOME | End: 2023-09-29
Attending: OBSTETRICS & GYNECOLOGY | Admitting: OBSTETRICS & GYNECOLOGY
Payer: COMMERCIAL

## 2023-09-29 VITALS
TEMPERATURE: 99 F | SYSTOLIC BLOOD PRESSURE: 104 MMHG | HEART RATE: 65 BPM | DIASTOLIC BLOOD PRESSURE: 56 MMHG | RESPIRATION RATE: 18 BRPM | OXYGEN SATURATION: 97 %

## 2023-09-29 DIAGNOSIS — D25.9 UTERINE FIBROID: ICD-10-CM

## 2023-09-29 DIAGNOSIS — Z98.890 S/P ROBOT-ASSISTED SURGICAL PROCEDURE: Primary | ICD-10-CM

## 2023-09-29 DIAGNOSIS — N93.8 DUB (DYSFUNCTIONAL UTERINE BLEEDING): ICD-10-CM

## 2023-09-29 LAB
ABO + RH BLD: NORMAL
B-HCG UR QL: NEGATIVE
BLD GP AB SCN CELLS X3 SERPL QL: NORMAL
CTP QC/QA: YES
POCT GLUCOSE: 83 MG/DL (ref 70–110)
SPECIMEN OUTDATE: NORMAL

## 2023-09-29 PROCEDURE — 25000003 PHARM REV CODE 250: Performed by: NURSE ANESTHETIST, CERTIFIED REGISTERED

## 2023-09-29 PROCEDURE — 36415 COLL VENOUS BLD VENIPUNCTURE: CPT | Performed by: OBSTETRICS & GYNECOLOGY

## 2023-09-29 PROCEDURE — 63600175 PHARM REV CODE 636 W HCPCS: Performed by: ANESTHESIOLOGY

## 2023-09-29 PROCEDURE — 25000003 PHARM REV CODE 250: Performed by: ANESTHESIOLOGY

## 2023-09-29 PROCEDURE — 27201423 OPTIME MED/SURG SUP & DEVICES STERILE SUPPLY: Performed by: OBSTETRICS & GYNECOLOGY

## 2023-09-29 PROCEDURE — 86901 BLOOD TYPING SEROLOGIC RH(D): CPT | Performed by: OBSTETRICS & GYNECOLOGY

## 2023-09-29 PROCEDURE — 37000009 HC ANESTHESIA EA ADD 15 MINS: Performed by: OBSTETRICS & GYNECOLOGY

## 2023-09-29 PROCEDURE — P9045 ALBUMIN (HUMAN), 5%, 250 ML: HCPCS | Mod: JZ,JG | Performed by: NURSE ANESTHETIST, CERTIFIED REGISTERED

## 2023-09-29 PROCEDURE — 71000016 HC POSTOP RECOV ADDL HR: Performed by: OBSTETRICS & GYNECOLOGY

## 2023-09-29 PROCEDURE — 37000008 HC ANESTHESIA 1ST 15 MINUTES: Performed by: OBSTETRICS & GYNECOLOGY

## 2023-09-29 PROCEDURE — 88305 TISSUE EXAM BY PATHOLOGIST: CPT | Mod: 26,,, | Performed by: STUDENT IN AN ORGANIZED HEALTH CARE EDUCATION/TRAINING PROGRAM

## 2023-09-29 PROCEDURE — 71000039 HC RECOVERY, EACH ADD'L HOUR: Performed by: OBSTETRICS & GYNECOLOGY

## 2023-09-29 PROCEDURE — 88305 TISSUE EXAM BY PATHOLOGIST: ICD-10-PCS | Mod: 26,,, | Performed by: STUDENT IN AN ORGANIZED HEALTH CARE EDUCATION/TRAINING PROGRAM

## 2023-09-29 PROCEDURE — 81025 URINE PREGNANCY TEST: CPT | Performed by: ANESTHESIOLOGY

## 2023-09-29 PROCEDURE — D9220A PRA ANESTHESIA: Mod: ANES,,, | Performed by: ANESTHESIOLOGY

## 2023-09-29 PROCEDURE — 36000711: Performed by: OBSTETRICS & GYNECOLOGY

## 2023-09-29 PROCEDURE — 63600175 PHARM REV CODE 636 W HCPCS: Performed by: NURSE ANESTHETIST, CERTIFIED REGISTERED

## 2023-09-29 PROCEDURE — D9220A PRA ANESTHESIA: ICD-10-PCS | Mod: CRNA,,, | Performed by: NURSE ANESTHETIST, CERTIFIED REGISTERED

## 2023-09-29 PROCEDURE — 71000015 HC POSTOP RECOV 1ST HR: Performed by: OBSTETRICS & GYNECOLOGY

## 2023-09-29 PROCEDURE — D9220A PRA ANESTHESIA: Mod: CRNA,,, | Performed by: NURSE ANESTHETIST, CERTIFIED REGISTERED

## 2023-09-29 PROCEDURE — D9220A PRA ANESTHESIA: ICD-10-PCS | Mod: ANES,,, | Performed by: ANESTHESIOLOGY

## 2023-09-29 PROCEDURE — 25000003 PHARM REV CODE 250: Performed by: OBSTETRICS & GYNECOLOGY

## 2023-09-29 PROCEDURE — 88305 TISSUE EXAM BY PATHOLOGIST: CPT | Performed by: STUDENT IN AN ORGANIZED HEALTH CARE EDUCATION/TRAINING PROGRAM

## 2023-09-29 PROCEDURE — 63600175 PHARM REV CODE 636 W HCPCS: Performed by: OBSTETRICS & GYNECOLOGY

## 2023-09-29 PROCEDURE — 71000033 HC RECOVERY, INTIAL HOUR: Performed by: OBSTETRICS & GYNECOLOGY

## 2023-09-29 PROCEDURE — 36000710: Performed by: OBSTETRICS & GYNECOLOGY

## 2023-09-29 PROCEDURE — 25000003 PHARM REV CODE 250: Performed by: STUDENT IN AN ORGANIZED HEALTH CARE EDUCATION/TRAINING PROGRAM

## 2023-09-29 RX ORDER — DIPHENHYDRAMINE HYDROCHLORIDE 50 MG/ML
25 INJECTION INTRAMUSCULAR; INTRAVENOUS EVERY 4 HOURS PRN
Status: CANCELLED | OUTPATIENT
Start: 2023-09-29

## 2023-09-29 RX ORDER — ONDANSETRON 2 MG/ML
INJECTION INTRAMUSCULAR; INTRAVENOUS
Status: DISCONTINUED | OUTPATIENT
Start: 2023-09-29 | End: 2023-09-29

## 2023-09-29 RX ORDER — KETOROLAC TROMETHAMINE 30 MG/ML
INJECTION, SOLUTION INTRAMUSCULAR; INTRAVENOUS
Status: DISCONTINUED | OUTPATIENT
Start: 2023-09-29 | End: 2023-09-29

## 2023-09-29 RX ORDER — SODIUM CHLORIDE, SODIUM LACTATE, POTASSIUM CHLORIDE, CALCIUM CHLORIDE 600; 310; 30; 20 MG/100ML; MG/100ML; MG/100ML; MG/100ML
INJECTION, SOLUTION INTRAVENOUS CONTINUOUS
Status: DISCONTINUED | OUTPATIENT
Start: 2023-09-29 | End: 2023-09-29 | Stop reason: HOSPADM

## 2023-09-29 RX ORDER — KETOROLAC TROMETHAMINE 30 MG/ML
15 INJECTION, SOLUTION INTRAMUSCULAR; INTRAVENOUS EVERY 6 HOURS PRN
Status: CANCELLED | OUTPATIENT
Start: 2023-09-29 | End: 2023-10-02

## 2023-09-29 RX ORDER — ACETAMINOPHEN 500 MG
1000 TABLET ORAL EVERY 6 HOURS PRN
Status: CANCELLED | OUTPATIENT
Start: 2023-09-29

## 2023-09-29 RX ORDER — BUPIVACAINE HYDROCHLORIDE 5 MG/ML
INJECTION, SOLUTION EPIDURAL; INTRACAUDAL
Status: DISCONTINUED | OUTPATIENT
Start: 2023-09-29 | End: 2023-09-29 | Stop reason: HOSPADM

## 2023-09-29 RX ORDER — ACETAMINOPHEN 500 MG
1000 TABLET ORAL
Status: COMPLETED | OUTPATIENT
Start: 2023-09-29 | End: 2023-09-29

## 2023-09-29 RX ORDER — ONDANSETRON 2 MG/ML
4 INJECTION INTRAMUSCULAR; INTRAVENOUS EVERY 4 HOURS PRN
Status: CANCELLED | OUTPATIENT
Start: 2023-09-29

## 2023-09-29 RX ORDER — VASOPRESSIN 20 [USP'U]/ML
INJECTION, SOLUTION INTRAMUSCULAR; SUBCUTANEOUS
Status: DISCONTINUED | OUTPATIENT
Start: 2023-09-29 | End: 2023-09-29 | Stop reason: HOSPADM

## 2023-09-29 RX ORDER — DIPHENHYDRAMINE HCL 25 MG
25 CAPSULE ORAL EVERY 4 HOURS PRN
Status: CANCELLED | OUTPATIENT
Start: 2023-09-29

## 2023-09-29 RX ORDER — ONDANSETRON 8 MG/1
8 TABLET, ORALLY DISINTEGRATING ORAL EVERY 8 HOURS PRN
Status: DISCONTINUED | OUTPATIENT
Start: 2023-09-29 | End: 2023-09-29 | Stop reason: HOSPADM

## 2023-09-29 RX ORDER — ALBUMIN HUMAN 50 G/1000ML
SOLUTION INTRAVENOUS CONTINUOUS PRN
Status: DISCONTINUED | OUTPATIENT
Start: 2023-09-29 | End: 2023-09-29

## 2023-09-29 RX ORDER — DEXAMETHASONE SODIUM PHOSPHATE 4 MG/ML
INJECTION, SOLUTION INTRA-ARTICULAR; INTRALESIONAL; INTRAMUSCULAR; INTRAVENOUS; SOFT TISSUE
Status: DISCONTINUED | OUTPATIENT
Start: 2023-09-29 | End: 2023-09-29

## 2023-09-29 RX ORDER — HYDROMORPHONE HYDROCHLORIDE 2 MG/ML
1 INJECTION, SOLUTION INTRAMUSCULAR; INTRAVENOUS; SUBCUTANEOUS EVERY 4 HOURS PRN
Status: CANCELLED | OUTPATIENT
Start: 2023-09-29

## 2023-09-29 RX ORDER — PHENYLEPHRINE HYDROCHLORIDE 10 MG/ML
INJECTION INTRAVENOUS
Status: DISCONTINUED | OUTPATIENT
Start: 2023-09-29 | End: 2023-09-29

## 2023-09-29 RX ORDER — DIPHENHYDRAMINE HYDROCHLORIDE 50 MG/ML
INJECTION INTRAMUSCULAR; INTRAVENOUS
Status: DISCONTINUED | OUTPATIENT
Start: 2023-09-29 | End: 2023-09-29

## 2023-09-29 RX ORDER — LIDOCAINE HYDROCHLORIDE 20 MG/ML
INJECTION INTRAVENOUS
Status: DISCONTINUED | OUTPATIENT
Start: 2023-09-29 | End: 2023-09-29

## 2023-09-29 RX ORDER — MEPERIDINE HYDROCHLORIDE 25 MG/ML
12.5 INJECTION INTRAMUSCULAR; INTRAVENOUS; SUBCUTANEOUS ONCE AS NEEDED
Status: COMPLETED | OUTPATIENT
Start: 2023-09-29 | End: 2023-09-29

## 2023-09-29 RX ORDER — LIDOCAINE HYDROCHLORIDE 10 MG/ML
0.5 INJECTION, SOLUTION EPIDURAL; INFILTRATION; INTRACAUDAL; PERINEURAL ONCE
Status: DISCONTINUED | OUTPATIENT
Start: 2023-09-29 | End: 2023-09-29 | Stop reason: HOSPADM

## 2023-09-29 RX ORDER — SODIUM CHLORIDE 9 MG/ML
INJECTION, SOLUTION INTRAVENOUS CONTINUOUS
Status: DISCONTINUED | OUTPATIENT
Start: 2023-09-29 | End: 2023-09-29 | Stop reason: HOSPADM

## 2023-09-29 RX ORDER — ONDANSETRON 2 MG/ML
4 INJECTION INTRAMUSCULAR; INTRAVENOUS DAILY PRN
Status: DISCONTINUED | OUTPATIENT
Start: 2023-09-29 | End: 2023-09-29 | Stop reason: HOSPADM

## 2023-09-29 RX ORDER — FENTANYL CITRATE 50 UG/ML
INJECTION, SOLUTION INTRAMUSCULAR; INTRAVENOUS
Status: DISCONTINUED | OUTPATIENT
Start: 2023-09-29 | End: 2023-09-29

## 2023-09-29 RX ORDER — LIDOCAINE HYDROCHLORIDE 10 MG/ML
INJECTION, SOLUTION INTRAVENOUS
Status: DISCONTINUED | OUTPATIENT
Start: 2023-09-29 | End: 2023-09-29

## 2023-09-29 RX ORDER — ROCURONIUM BROMIDE 10 MG/ML
INJECTION, SOLUTION INTRAVENOUS
Status: DISCONTINUED | OUTPATIENT
Start: 2023-09-29 | End: 2023-09-29

## 2023-09-29 RX ORDER — PROPOFOL 10 MG/ML
VIAL (ML) INTRAVENOUS
Status: DISCONTINUED | OUTPATIENT
Start: 2023-09-29 | End: 2023-09-29

## 2023-09-29 RX ORDER — PROCHLORPERAZINE EDISYLATE 5 MG/ML
5 INJECTION INTRAMUSCULAR; INTRAVENOUS EVERY 6 HOURS PRN
Status: DISCONTINUED | OUTPATIENT
Start: 2023-09-29 | End: 2023-09-29 | Stop reason: HOSPADM

## 2023-09-29 RX ORDER — OXYCODONE HYDROCHLORIDE 5 MG/1
5 TABLET ORAL EVERY 6 HOURS PRN
Qty: 15 TABLET | Refills: 0 | Status: SHIPPED | OUTPATIENT
Start: 2023-09-29

## 2023-09-29 RX ORDER — HYDROMORPHONE HYDROCHLORIDE 2 MG/ML
0.2 INJECTION, SOLUTION INTRAMUSCULAR; INTRAVENOUS; SUBCUTANEOUS
Status: CANCELLED | OUTPATIENT
Start: 2023-09-29

## 2023-09-29 RX ORDER — HYDROCODONE BITARTRATE AND ACETAMINOPHEN 5; 325 MG/1; MG/1
1 TABLET ORAL EVERY 4 HOURS PRN
Status: CANCELLED | OUTPATIENT
Start: 2023-09-29

## 2023-09-29 RX ORDER — SODIUM CHLORIDE 0.9 % (FLUSH) 0.9 %
3 SYRINGE (ML) INJECTION
Status: DISCONTINUED | OUTPATIENT
Start: 2023-09-29 | End: 2023-09-29 | Stop reason: HOSPADM

## 2023-09-29 RX ORDER — HYDROMORPHONE HYDROCHLORIDE 2 MG/ML
0.4 INJECTION, SOLUTION INTRAMUSCULAR; INTRAVENOUS; SUBCUTANEOUS EVERY 5 MIN PRN
Status: DISCONTINUED | OUTPATIENT
Start: 2023-09-29 | End: 2023-09-29 | Stop reason: HOSPADM

## 2023-09-29 RX ORDER — MUPIROCIN 20 MG/G
OINTMENT TOPICAL
Status: DISCONTINUED | OUTPATIENT
Start: 2023-09-29 | End: 2023-09-29 | Stop reason: HOSPADM

## 2023-09-29 RX ORDER — OXYCODONE HYDROCHLORIDE 5 MG/1
5 TABLET ORAL
Status: DISCONTINUED | OUTPATIENT
Start: 2023-09-29 | End: 2023-09-29 | Stop reason: HOSPADM

## 2023-09-29 RX ORDER — MIDAZOLAM HYDROCHLORIDE 1 MG/ML
INJECTION INTRAMUSCULAR; INTRAVENOUS
Status: DISCONTINUED | OUTPATIENT
Start: 2023-09-29 | End: 2023-09-29

## 2023-09-29 RX ORDER — FAMOTIDINE 20 MG/1
20 TABLET, FILM COATED ORAL
Status: COMPLETED | OUTPATIENT
Start: 2023-09-29 | End: 2023-09-29

## 2023-09-29 RX ADMIN — DIPHENHYDRAMINE HYDROCHLORIDE 25 MG: 50 INJECTION, SOLUTION INTRAMUSCULAR; INTRAVENOUS at 10:09

## 2023-09-29 RX ADMIN — HYDROMORPHONE HYDROCHLORIDE 0.4 MG: 2 INJECTION INTRAMUSCULAR; INTRAVENOUS; SUBCUTANEOUS at 03:09

## 2023-09-29 RX ADMIN — ROCURONIUM BROMIDE 50 MG: 10 INJECTION, SOLUTION INTRAVENOUS at 10:09

## 2023-09-29 RX ADMIN — KETOROLAC TROMETHAMINE 30 MG: 30 INJECTION, SOLUTION INTRAMUSCULAR; INTRAVENOUS at 12:09

## 2023-09-29 RX ADMIN — MIDAZOLAM HYDROCHLORIDE 2 MG: 1 INJECTION, SOLUTION INTRAMUSCULAR; INTRAVENOUS at 09:09

## 2023-09-29 RX ADMIN — DEXAMETHASONE SODIUM PHOSPHATE 4 MG: 4 INJECTION, SOLUTION INTRAMUSCULAR; INTRAVENOUS at 11:09

## 2023-09-29 RX ADMIN — FENTANYL CITRATE 100 MCG: 50 INJECTION, SOLUTION INTRAMUSCULAR; INTRAVENOUS at 10:09

## 2023-09-29 RX ADMIN — LIDOCAINE HYDROCHLORIDE 75 MG: 10 INJECTION, SOLUTION INTRAVENOUS at 10:09

## 2023-09-29 RX ADMIN — SUGAMMADEX 200 MG: 100 INJECTION, SOLUTION INTRAVENOUS at 12:09

## 2023-09-29 RX ADMIN — ALBUMIN (HUMAN): 2.5 SOLUTION INTRAVENOUS at 11:09

## 2023-09-29 RX ADMIN — CARBOXYMETHYLCELLULOSE SODIUM 2 DROP: 2.5 SOLUTION/ DROPS OPHTHALMIC at 10:09

## 2023-09-29 RX ADMIN — PHENYLEPHRINE HYDROCHLORIDE 100 MCG: 10 INJECTION INTRAVENOUS at 12:09

## 2023-09-29 RX ADMIN — SODIUM CHLORIDE, SODIUM LACTATE, POTASSIUM CHLORIDE, AND CALCIUM CHLORIDE: .6; .31; .03; .02 INJECTION, SOLUTION INTRAVENOUS at 09:09

## 2023-09-29 RX ADMIN — PHENYLEPHRINE HYDROCHLORIDE 100 MCG: 10 INJECTION INTRAVENOUS at 11:09

## 2023-09-29 RX ADMIN — FAMOTIDINE 20 MG: 20 TABLET, FILM COATED ORAL at 08:09

## 2023-09-29 RX ADMIN — MEPERIDINE HYDROCHLORIDE 12.5 MG: 25 INJECTION INTRAMUSCULAR; INTRAVENOUS; SUBCUTANEOUS at 01:09

## 2023-09-29 RX ADMIN — ACETAMINOPHEN 1000 MG: 500 TABLET ORAL at 08:09

## 2023-09-29 RX ADMIN — OXYCODONE HYDROCHLORIDE 5 MG: 5 TABLET ORAL at 04:09

## 2023-09-29 RX ADMIN — PROCHLORPERAZINE EDISYLATE 2.5 MG: 5 INJECTION INTRAMUSCULAR; INTRAVENOUS at 01:09

## 2023-09-29 RX ADMIN — PROPOFOL 150 MG: 10 INJECTION, EMULSION INTRAVENOUS at 10:09

## 2023-09-29 RX ADMIN — LIDOCAINE HYDROCHLORIDE 100 MG: 20 INJECTION, SOLUTION INTRAVENOUS at 10:09

## 2023-09-29 RX ADMIN — OXYCODONE HYDROCHLORIDE 5 MG: 5 TABLET ORAL at 01:09

## 2023-09-29 RX ADMIN — ONDANSETRON HYDROCHLORIDE 4 MG: 2 INJECTION INTRAMUSCULAR; INTRAVENOUS at 11:09

## 2023-09-29 RX ADMIN — MUPIROCIN: 20 OINTMENT TOPICAL at 08:09

## 2023-09-29 NOTE — ANESTHESIA POSTPROCEDURE EVALUATION
Anesthesia Post Evaluation    Patient: Petra Medina    Procedure(s) Performed: Procedure(s) (LRB):  ROBOTIC MYOMECTOMY, UTERUS (N/A)    Final Anesthesia Type: general      Patient location during evaluation: PACU  Patient participation: Yes- Able to Participate  Level of consciousness: awake and alert  Post-procedure vital signs: reviewed and stable  Pain management: adequate  Airway patency: patent    PONV status at discharge: No PONV  Anesthetic complications: no      Cardiovascular status: blood pressure returned to baseline  Respiratory status: unassisted  Hydration status: euvolemic  Follow-up not needed.          Vitals Value Taken Time   /64 09/29/23 1402   Temp 36.1 °C (97 °F) 09/29/23 1256   Pulse 97 09/29/23 1408   Resp 16 09/29/23 1400   SpO2 98 % 09/29/23 1408   Vitals shown include unvalidated device data.      Event Time   Out of Recovery 14:09:00         Pain/Julius Score: Pain Rating Prior to Med Admin: 4 (9/29/2023  1:36 PM)  Julius Score: 9 (9/29/2023  1:56 PM)

## 2023-09-29 NOTE — TRANSFER OF CARE
Anesthesia Transfer of Care Note    Patient: Petra Medina    Procedure(s) Performed: Procedure(s) (LRB):  ROBOTIC MYOMECTOMY, UTERUS (N/A)    Patient location: PACU    Anesthesia Type: general    Transport from OR: Transported from OR on 6-10 L/min O2 by face mask with adequate spontaneous ventilation    Post pain: adequate analgesia    Post assessment: no apparent anesthetic complications and tolerated procedure well    Post vital signs: stable    Level of consciousness: responds to stimulation    Nausea/Vomiting: no nausea/vomiting    Complications: none    Transfer of care protocol was followed      Last vitals:   Visit Vitals  /63   Pulse 84   Temp 36.9 °C (98.5 °F) (Oral)   Resp 16   LMP 11/15/2022   SpO2 97%   Breastfeeding No

## 2023-09-29 NOTE — OP NOTE
[facility]  GYNSurgery  Operative Note    SUMMARY     Date of Procedure: 9/29/2023     Procedure: Procedure(s) (LRB):  ROBOTIC MYOMECTOMY, UTERUS (N/A)       Surgeon(s) and Role:     * Juan Carlos Mendez III, MD - Primary    Assisting Surgeon: None    Pre-Operative Diagnosis: DUB (dysfunctional uterine bleeding) [N93.8]  Abnormal uterine bleeding due to submucousal leiomyoma of uterus [N93.9, D25.0]    Post-Operative Diagnosis: Post-Op Diagnosis Codes:     * DUB (dysfunctional uterine bleeding) [N93.8]     * Abnormal uterine bleeding due to submucousal leiomyoma of uterus [N93.9, D25.0]    Anesthesia: General    Operative Findings (including complications, if any):  Normal upper abdominal anatomy, normal external genitalia, normal bilateral fallopian tubes and ovaries.  No evidence of endometriosis or ovarian cysts.  Large fibroid uterus    Description of Technical Procedures:  The patient was taken to the operating room and after surgical time-out was performed and adequate anesthesia with the patient was prepped and draped in normal sterile fashion the dorsal lithotomy position a sterile [place] catheter was placed into the bladder.  A sterile speculum was placed into the vagina and the anterior lip of the cervix was grasped with a single-tooth tenaculum.  The uterus was then sounded to approximately 7 cm.  This allowed easy passage of a RAI manipulator into the uterine cavity.  Attention was then turned to the abdomen.  A 5 mm infraumbilical incision was made with a scalpel.  This allowed easy passage of the robot camera trocar into the abdomen.  This was done after insufflation with CO2 gas using the Veress needle.  Full abdominal spill inspection is as above.  We then placed left and right lower quadrant assistant trocars under direct visualization.  A 3 cm suprapubic incision was made with a scalpel.  This was carried down to the underlying layer of the fascia the fascia was incised with the  Bovie.  The peritoneum was entered bluntly.  The incision was then stretched allowing easy placement of a small GelPOINT.  The robot was then docked.  20% vasopressin solution and normal saline was used to inject the uterus.  Monopolar cautery was used to incise the uterine cavity and the fibroid was removed with blunt and sharp dissection.  Careful attention to the endometrium was noted.  The fibroid was removed in its entirety without difficulty.  Once the fibroid was placed in the posterior cul-de-sac, the incision was then closed in 4 separate layers of 2-0 V lock.  Hemostasis was noted.  Copious amounts of irrigation was then performed.  The robot was undocked.  The pathology was placed into a bag and removed through the GelPOINT without difficulty.  The abdomen was then re-insufflated re-insufflated and inspection of the operative bed was unremarkable.  Irrigation was performed again.  Surgicel was placed on the surgical wound.  Trocars were removed from the abdomen and the fascia at the GelPOINT incision was closed with 0 Vicryl.  The skin at all incision sites were closed with 4-0 Monocryl in a running subcuticular fashion.  All instrument instruments were removed from the vagina and uterus.  Sponge needle and instrument counts were correct x2.  The patient was taken to the recovery room and a in stable condition    Significant Surgical Tasks Conducted by the Assistant(s), if Applicable:  None    Estimated Blood Loss (EBL): 150 mL           Implants: * No implants in log *    Specimens:   Specimen (24h ago, onward)       Start     Ordered    09/29/23 1224  Specimen to Pathology, Surgery Gynecology and Obstetrics  Once        Comments: Pre-op Diagnosis: DUB (dysfunctional uterine bleeding) [N93.8]Abnormal uterine bleeding due to submucousal leiomyoma of uterus [N93.9, D25.0]Procedure(s):ROBOTIC MYOMECTOMY, UTERUS Number of specimens: 1Name of specimens: 1. Uterine fibroid (perm)     References:    Click here  for ordering Quick Tip   Question Answer Comment   Procedure Type: Gynecology and Obstetrics    Which provider would you like to cc? RANDY NEAL III.    Release to patient Immediate        09/29/23 1227    Pending  Specimen to Pathology, Surgery Gynecology and Obstetrics  Once        Comments: Pre-op Diagnosis: DUB (dysfunctional uterine bleeding) [N93.8]Abnormal uterine bleeding due to submucousal leiomyoma of uterus [N93.9, D25.0]Procedure(s):ROBOTIC MYOMECTOMY, UTERUS Number of specimens: 1Name of specimens: 1. Uterine fibroid (perm)     References:    Click here for ordering Quick Tip   Question Answer Comment   Procedure Type: Gynecology and Obstetrics    Specimen Class: Complex case/Special    Which provider would you like to cc? RANDY NEAL III    Release to patient Immediate        Pending                            Condition: Good    Disposition: PACU - hemodynamically stable.    Attestation: I was present and scrubbed for the entire procedure.

## 2023-09-29 NOTE — ANESTHESIA PROCEDURE NOTES
Intubation    Date/Time: 9/29/2023 10:18 AM    Performed by: Elisa Donald CRNA  Authorized by: Sheldon Hale MD    Intubation:     Induction:  Inhalational - mask    Intubated:  Postinduction    Mask Ventilation:  Easy mask    Attempts:  1    Attempted By:  CRNA    Method of Intubation:  Video laryngoscopy    Blade:  Aaron 3    Laryngeal View Grade: Grade I - full view of cords      Difficult Airway Encountered?: No      Complications:  None    Airway Device:  Oral endotracheal tube    Airway Device Size:  7.0    Style/Cuff Inflation:  Cuffed    Inflation Amount (mL):  5    Tube secured:  21    Secured at:  The lips    Placement Verified By:  Capnometry    Complicating Factors:  None

## 2023-09-29 NOTE — PLAN OF CARE
Petra Maryann Medina has met all discharge criteria from Phase II. Vital Signs are stable, ambulating  without difficulty. Discharge instructions given, patient verbalized understanding. Discharged from facility via wheelchair in stable condition.

## 2023-09-29 NOTE — DISCHARGE INSTRUCTIONS
Discharge Instructions for Abdominal Myomectomy  You had a procedure called abdominal myomectomy, a surgery to remove fibroids. This can relieve such problems as severe pain and bleeding. It usually takes from 4 to 6 weeks to recover from abdominal myomectomy. Remember, though, that recovery time varies from woman to woman.     When to call your doctor  Call your doctor right away if you have any of the following:  Fever above 100.4°F (38°C)   Chills  Bright red vaginal bleeding or vaginal bleeding thatsoaks more than 1 pad per hour  A smelly discharge from the vagina  Trouble urinating or burning when you urinate  Severe pain or bloating in your abdomen  Redness, swelling, or drainage at your incision site  Shortness of breath or chest pain  Nausea and vomiting      Home care  These are suggestions for what to do once you are home:  Dont drive until your doctor says it's OK. Dont drive while you are still taking opioid pain medications.  Ask others to help with chores and errands while you recover.  Dont lift anything heavier than 10 pounds for 6 weeks.  Dont vacuum or do other strenuous activities until the doctor says its OK.  Walk as often as you feel able.  When you must climb stairs, go slowly and pause after every few steps.  Continue the coughing and deep breathing exercises that you learned in the hospital.  Avoid constipation:  Eat fruits, vegetables, and whole grains.  Drink 6 to 8 glasses of water a day, unless directed otherwise.  Use a laxative or a mild stool softener if your doctor says its OK.  Shower as usual. Wash your incision with mild soap and water. Do not scrub the incision to clean it. Pat it dry.  Dont use oils, powders, or lotions on your incision.  Dont put anything in your vagina until your doctor says its safe to do so. Dont use tampons or douches. Dont have sex. Do not do any of these things for 6 weeks.    Follow-up  Ask your doctor when you can return to work.  Date Last  Reviewed: 5/19/2015  © 8888-8470 [employer]. [address] [place] [address], [place] [address]. All rights reserved. This information is not intended as a substitute for professional medical care. Always follow your healthcare professional's instructions.

## 2023-09-29 NOTE — BRIEF OP NOTE
[facility] - Surgery ([place])  Brief Operative Note    Surgery Date: 9/29/2023     Surgeon(s) and Role:     * Juan Carlos Neal III, MD - Primary    Assisting Surgeon: None    Pre-op Diagnosis:  DUB (dysfunctional uterine bleeding) [N93.8]  Abnormal uterine bleeding due to submucousal leiomyoma of uterus [N93.9, D25.0]    Post-op Diagnosis:  Post-Op Diagnosis Codes:     * DUB (dysfunctional uterine bleeding) [N93.8]     * Abnormal uterine bleeding due to submucousal leiomyoma of uterus [N93.9, D25.0]    Procedure(s) (LRB):  ROBOTIC MYOMECTOMY, UTERUS (N/A)    Anesthesia: General    Operative Findings: see op nont    Estimated Blood Loss: 150 mL         Specimens:   Specimen (24h ago, onward)       Start     Ordered    09/29/23 1224  Specimen to Pathology, Surgery Gynecology and Obstetrics  Once        Comments: Pre-op Diagnosis: DUB (dysfunctional uterine bleeding) [N93.8]Abnormal uterine bleeding due to submucousal leiomyoma of uterus [N93.9, D25.0]Procedure(s):ROBOTIC MYOMECTOMY, UTERUS Number of specimens: 1Name of specimens: 1. Uterine fibroid (perm)     References:    Click here for ordering Quick Tip   Question Answer Comment   Procedure Type: Gynecology and Obstetrics    Which provider would you like to cc? JUAN CARLOS NEAL III    Release to patient Immediate        09/29/23 1227    Pending  Specimen to Pathology, Surgery Gynecology and Obstetrics  Once        Comments: Pre-op Diagnosis: DUB (dysfunctional uterine bleeding) [N93.8]Abnormal uterine bleeding due to submucousal leiomyoma of uterus [N93.9, D25.0]Procedure(s):ROBOTIC MYOMECTOMY, UTERUS Number of specimens: 1Name of specimens: 1. Uterine fibroid (perm)     References:    Click here for ordering Quick Tip   Question Answer Comment   Procedure Type: Gynecology and Obstetrics    Specimen Class: Complex case/Special    Which provider would you like to cc? JUAN CARLOS NEAL III    Release to patient Immediate        Pending                      Discharge  Note    OUTCOME: Patient tolerated treatment/procedure well without complication and is now ready for discharge.    DISPOSITION: Home or Self Care    FINAL DIAGNOSIS:  <principal problem not specified>    FOLLOWUP: In clinic    DISCHARGE INSTRUCTIONS:  No discharge procedures on file.

## 2023-10-06 LAB
FINAL PATHOLOGIC DIAGNOSIS: NORMAL
Lab: NORMAL

## 2023-10-13 ENCOUNTER — LAB VISIT (OUTPATIENT)
Dept: LAB | Facility: HOSPITAL | Age: 18
End: 2023-10-13
Attending: STUDENT IN AN ORGANIZED HEALTH CARE EDUCATION/TRAINING PROGRAM
Payer: COMMERCIAL

## 2023-10-13 DIAGNOSIS — D50.0 IRON DEFICIENCY ANEMIA DUE TO CHRONIC BLOOD LOSS: ICD-10-CM

## 2023-10-13 LAB
BASOPHILS # BLD AUTO: 0.05 K/UL (ref 0–0.2)
BASOPHILS # BLD AUTO: 0.05 K/UL (ref 0–0.2)
BASOPHILS NFR BLD: 0.7 % (ref 0–1.9)
BASOPHILS NFR BLD: 0.7 % (ref 0–1.9)
DIFFERENTIAL METHOD: NORMAL
DIFFERENTIAL METHOD: NORMAL
EOSINOPHIL # BLD AUTO: 0.4 K/UL (ref 0–0.5)
EOSINOPHIL # BLD AUTO: 0.4 K/UL (ref 0–0.5)
EOSINOPHIL NFR BLD: 5.2 % (ref 0–8)
EOSINOPHIL NFR BLD: 5.2 % (ref 0–8)
ERYTHROCYTE [DISTWIDTH] IN BLOOD BY AUTOMATED COUNT: 14.2 % (ref 11.5–14.5)
ERYTHROCYTE [DISTWIDTH] IN BLOOD BY AUTOMATED COUNT: 14.2 % (ref 11.5–14.5)
FERRITIN SERPL-MCNC: 281 NG/ML (ref 20–300)
HCT VFR BLD AUTO: 37.3 % (ref 37–48.5)
HCT VFR BLD AUTO: 37.3 % (ref 37–48.5)
HGB BLD-MCNC: 12.1 G/DL (ref 12–16)
HGB BLD-MCNC: 12.1 G/DL (ref 12–16)
IMM GRANULOCYTES # BLD AUTO: 0.02 K/UL (ref 0–0.04)
IMM GRANULOCYTES # BLD AUTO: 0.02 K/UL (ref 0–0.04)
IMM GRANULOCYTES NFR BLD AUTO: 0.3 % (ref 0–0.5)
IMM GRANULOCYTES NFR BLD AUTO: 0.3 % (ref 0–0.5)
IRON SERPL-MCNC: 86 UG/DL (ref 30–160)
LYMPHOCYTES # BLD AUTO: 1.6 K/UL (ref 1–4.8)
LYMPHOCYTES # BLD AUTO: 1.6 K/UL (ref 1–4.8)
LYMPHOCYTES NFR BLD: 22.9 % (ref 18–48)
LYMPHOCYTES NFR BLD: 22.9 % (ref 18–48)
MCH RBC QN AUTO: 28.8 PG (ref 27–31)
MCH RBC QN AUTO: 28.8 PG (ref 27–31)
MCHC RBC AUTO-ENTMCNC: 32.4 G/DL (ref 32–36)
MCHC RBC AUTO-ENTMCNC: 32.4 G/DL (ref 32–36)
MCV RBC AUTO: 89 FL (ref 82–98)
MCV RBC AUTO: 89 FL (ref 82–98)
MONOCYTES # BLD AUTO: 0.6 K/UL (ref 0.3–1)
MONOCYTES # BLD AUTO: 0.6 K/UL (ref 0.3–1)
MONOCYTES NFR BLD: 8.9 % (ref 4–15)
MONOCYTES NFR BLD: 8.9 % (ref 4–15)
NEUTROPHILS # BLD AUTO: 4.3 K/UL (ref 1.8–7.7)
NEUTROPHILS # BLD AUTO: 4.3 K/UL (ref 1.8–7.7)
NEUTROPHILS NFR BLD: 62 % (ref 38–73)
NEUTROPHILS NFR BLD: 62 % (ref 38–73)
NRBC BLD-RTO: 0 /100 WBC
NRBC BLD-RTO: 0 /100 WBC
PLATELET # BLD AUTO: 234 K/UL (ref 150–450)
PLATELET # BLD AUTO: 234 K/UL (ref 150–450)
PMV BLD AUTO: 12.4 FL (ref 9.2–12.9)
PMV BLD AUTO: 12.4 FL (ref 9.2–12.9)
RBC # BLD AUTO: 4.2 M/UL (ref 4–5.4)
RBC # BLD AUTO: 4.2 M/UL (ref 4–5.4)
SATURATED IRON: 29 % (ref 20–50)
TOTAL IRON BINDING CAPACITY: 292 UG/DL (ref 250–450)
TRANSFERRIN SERPL-MCNC: 197 MG/DL (ref 200–375)
WBC # BLD AUTO: 6.86 K/UL (ref 3.9–12.7)
WBC # BLD AUTO: 6.86 K/UL (ref 3.9–12.7)

## 2023-10-13 PROCEDURE — 82728 ASSAY OF FERRITIN: CPT | Performed by: STUDENT IN AN ORGANIZED HEALTH CARE EDUCATION/TRAINING PROGRAM

## 2023-10-13 PROCEDURE — 83540 ASSAY OF IRON: CPT | Performed by: STUDENT IN AN ORGANIZED HEALTH CARE EDUCATION/TRAINING PROGRAM

## 2023-10-13 PROCEDURE — 36415 COLL VENOUS BLD VENIPUNCTURE: CPT | Performed by: STUDENT IN AN ORGANIZED HEALTH CARE EDUCATION/TRAINING PROGRAM

## 2023-10-13 PROCEDURE — 85025 COMPLETE CBC W/AUTO DIFF WBC: CPT | Performed by: STUDENT IN AN ORGANIZED HEALTH CARE EDUCATION/TRAINING PROGRAM

## 2023-10-13 PROCEDURE — 84466 ASSAY OF TRANSFERRIN: CPT | Performed by: STUDENT IN AN ORGANIZED HEALTH CARE EDUCATION/TRAINING PROGRAM

## 2023-10-16 PROBLEM — N39.0 UTI (URINARY TRACT INFECTION): Status: RESOLVED | Noted: 2023-07-11 | Resolved: 2023-10-16

## 2023-10-18 ENCOUNTER — PATIENT MESSAGE (OUTPATIENT)
Dept: OBSTETRICS AND GYNECOLOGY | Facility: CLINIC | Age: 18
End: 2023-10-18
Payer: COMMERCIAL

## 2023-10-19 ENCOUNTER — PATIENT MESSAGE (OUTPATIENT)
Dept: OBSTETRICS AND GYNECOLOGY | Facility: CLINIC | Age: 18
End: 2023-10-19
Payer: COMMERCIAL

## 2023-10-24 ENCOUNTER — PATIENT MESSAGE (OUTPATIENT)
Dept: FAMILY MEDICINE | Facility: CLINIC | Age: 18
End: 2023-10-24
Payer: COMMERCIAL

## 2023-10-24 DIAGNOSIS — F32.A ANXIETY AND DEPRESSION: ICD-10-CM

## 2023-10-24 DIAGNOSIS — F41.9 ANXIETY AND DEPRESSION: ICD-10-CM

## 2023-10-24 RX ORDER — SERTRALINE HYDROCHLORIDE 50 MG/1
50 TABLET, FILM COATED ORAL DAILY
Qty: 90 TABLET | Refills: 1 | Status: SHIPPED | OUTPATIENT
Start: 2023-10-24 | End: 2024-01-29 | Stop reason: SDUPTHER

## 2023-10-24 NOTE — TELEPHONE ENCOUNTER
No care due was identified.  [facility] Embedded Care Due Messages. Reference number: [id number].   10/24/2023 11:24:00 AM CDT

## 2023-11-28 ENCOUNTER — LAB VISIT (OUTPATIENT)
Dept: LAB | Facility: HOSPITAL | Age: 18
End: 2023-11-28
Attending: STUDENT IN AN ORGANIZED HEALTH CARE EDUCATION/TRAINING PROGRAM
Payer: COMMERCIAL

## 2023-11-28 DIAGNOSIS — D50.0 IRON DEFICIENCY ANEMIA DUE TO CHRONIC BLOOD LOSS: ICD-10-CM

## 2023-11-28 LAB
BASOPHILS # BLD AUTO: 0.04 K/UL (ref 0–0.2)
BASOPHILS NFR BLD: 0.7 % (ref 0–1.9)
DIFFERENTIAL METHOD: NORMAL
EOSINOPHIL # BLD AUTO: 0.1 K/UL (ref 0–0.5)
EOSINOPHIL NFR BLD: 2.2 % (ref 0–8)
ERYTHROCYTE [DISTWIDTH] IN BLOOD BY AUTOMATED COUNT: 13.3 % (ref 11.5–14.5)
FERRITIN SERPL-MCNC: 139 NG/ML (ref 20–300)
HCT VFR BLD AUTO: 40.1 % (ref 37–48.5)
HGB BLD-MCNC: 13 G/DL (ref 12–16)
IMM GRANULOCYTES # BLD AUTO: 0.02 K/UL (ref 0–0.04)
IMM GRANULOCYTES NFR BLD AUTO: 0.4 % (ref 0–0.5)
IRON SERPL-MCNC: 153 UG/DL (ref 30–160)
LYMPHOCYTES # BLD AUTO: 1.7 K/UL (ref 1–4.8)
LYMPHOCYTES NFR BLD: 30.1 % (ref 18–48)
MCH RBC QN AUTO: 28.4 PG (ref 27–31)
MCHC RBC AUTO-ENTMCNC: 32.4 G/DL (ref 32–36)
MCV RBC AUTO: 88 FL (ref 82–98)
MONOCYTES # BLD AUTO: 0.3 K/UL (ref 0.3–1)
MONOCYTES NFR BLD: 5.4 % (ref 4–15)
NEUTROPHILS # BLD AUTO: 3.4 K/UL (ref 1.8–7.7)
NEUTROPHILS NFR BLD: 61.2 % (ref 38–73)
NRBC BLD-RTO: 0 /100 WBC
PLATELET # BLD AUTO: 177 K/UL (ref 150–450)
PMV BLD AUTO: 12.8 FL (ref 9.2–12.9)
RBC # BLD AUTO: 4.57 M/UL (ref 4–5.4)
SATURATED IRON: 47 % (ref 20–50)
TOTAL IRON BINDING CAPACITY: 329 UG/DL (ref 250–450)
TRANSFERRIN SERPL-MCNC: 222 MG/DL (ref 200–375)
WBC # BLD AUTO: 5.55 K/UL (ref 3.9–12.7)

## 2023-11-28 PROCEDURE — 85025 COMPLETE CBC W/AUTO DIFF WBC: CPT | Performed by: STUDENT IN AN ORGANIZED HEALTH CARE EDUCATION/TRAINING PROGRAM

## 2023-11-28 PROCEDURE — 83540 ASSAY OF IRON: CPT | Performed by: STUDENT IN AN ORGANIZED HEALTH CARE EDUCATION/TRAINING PROGRAM

## 2023-11-28 PROCEDURE — 82728 ASSAY OF FERRITIN: CPT | Performed by: STUDENT IN AN ORGANIZED HEALTH CARE EDUCATION/TRAINING PROGRAM

## 2023-11-28 PROCEDURE — 36415 COLL VENOUS BLD VENIPUNCTURE: CPT | Performed by: STUDENT IN AN ORGANIZED HEALTH CARE EDUCATION/TRAINING PROGRAM

## 2023-11-28 PROCEDURE — 84466 ASSAY OF TRANSFERRIN: CPT | Performed by: STUDENT IN AN ORGANIZED HEALTH CARE EDUCATION/TRAINING PROGRAM

## 2023-12-20 ENCOUNTER — PATIENT MESSAGE (OUTPATIENT)
Dept: OBSTETRICS AND GYNECOLOGY | Facility: CLINIC | Age: 18
End: 2023-12-20
Payer: COMMERCIAL

## 2023-12-22 RX ORDER — ETONOGESTREL AND ETHINYL ESTRADIOL VAGINAL RING .015; .12 MG/D; MG/D
1 RING VAGINAL
Qty: 1 EACH | Refills: 11 | Status: SHIPPED | OUTPATIENT
Start: 2023-12-22 | End: 2024-12-21

## 2024-01-02 ENCOUNTER — PATIENT MESSAGE (OUTPATIENT)
Dept: PSYCHIATRY | Facility: CLINIC | Age: 19
End: 2024-01-02
Payer: COMMERCIAL

## 2024-01-02 ENCOUNTER — TELEPHONE (OUTPATIENT)
Dept: PSYCHIATRY | Facility: CLINIC | Age: 19
End: 2024-01-02
Payer: COMMERCIAL

## 2024-01-29 DIAGNOSIS — F41.9 ANXIETY AND DEPRESSION: ICD-10-CM

## 2024-01-29 DIAGNOSIS — F32.A ANXIETY AND DEPRESSION: ICD-10-CM

## 2024-01-30 RX ORDER — SERTRALINE HYDROCHLORIDE 50 MG/1
50 TABLET, FILM COATED ORAL DAILY
Qty: 90 TABLET | Refills: 1 | Status: SHIPPED | OUTPATIENT
Start: 2024-01-30 | End: 2024-07-28

## 2024-01-30 NOTE — TELEPHONE ENCOUNTER
Refill Decision Note   Petra Carol  is requesting a refill authorization.  Brief Assessment and Rationale for Refill:  Approve     Medication Therapy Plan:         Comments:     Note composed:10:24 AM 01/30/2024

## 2024-01-30 NOTE — TELEPHONE ENCOUNTER
No care due was identified.  [facility] Embedded Care Due Messages. Reference number: [id number].   1/29/2024 11:57:24 PM CST

## 2024-04-05 RX ORDER — ETONOGESTREL AND ETHINYL ESTRADIOL VAGINAL RING .015; .12 MG/D; MG/D
1 RING VAGINAL
Qty: 3 EACH | Refills: 1 | Status: SHIPPED | OUTPATIENT
Start: 2024-04-05

## 2024-04-05 NOTE — TELEPHONE ENCOUNTER
Refill Routing Note   Medication(s) are not appropriate for processing by [facility] for the following reason(s):        Drug-drug interaction  Drug-disease interaction    [facility] action(s):  Defer        Medication Therapy Plan: Drug-Disease: etonogestrel-EE and AUB; Duplicate Therapy: etonogestrel-EE and MyFembree (Overridden by Katja Dang MD on Dec 22, 2023)    Pharmacist review requested: Yes     Appointments  past 12m or future 3m with PCP    Date Provider   Last Visit   9/27/2023 Katja Dang MD   Next Visit   Visit date not found Katja Dang MD   ED visits in past 90 days: 0        Note composed:8:55 AM 04/05/2024

## 2024-04-05 NOTE — TELEPHONE ENCOUNTER
Refill Decision Note   Petra Mcgarrydeny  is requesting a refill authorization.  Brief Assessment and Rationale for Refill:  Approve     Medication Therapy Plan:         Pharmacist review requested: Yes   Comments:     Note composed:11:53 AM 04/05/2024

## 2024-10-16 RX ORDER — ETONOGESTREL AND ETHINYL ESTRADIOL VAGINAL RING .015; .12 MG/D; MG/D
RING VAGINAL
Qty: 3 EACH | Refills: 0 | Status: SHIPPED | OUTPATIENT
Start: 2024-10-16

## 2024-10-16 NOTE — TELEPHONE ENCOUNTER
Refill Routing Note   Medication(s) are not appropriate for processing by [facility] for the following reason(s):        Drug-disease interaction    [facility] action(s):  Defer        Medication Therapy Plan: Drug-Disease: etonogestreL-ethinyl estradioL and Abnormal uterine bleeding (AUB) (Overridden by Katja Dang MD on Dec 22, 2023)    Pharmacist review requested: Yes     Appointments  past 12m or future 3m with PCP    Date Provider   Last Visit   9/27/2023 Katja Dang MD   Next Visit   Visit date not found Katja Dang MD   ED visits in past 90 days: 0        Note composed:9:32 AM 10/16/2024

## 2024-10-16 NOTE — TELEPHONE ENCOUNTER
Refill Decision Note   Petra Mcgarrydeny  is requesting a refill authorization.  Brief Assessment and Rationale for Refill:  Approve     Medication Therapy Plan:         Pharmacist review requested: Yes   Extended chart review required: Yes   Comments:     Note composed:11:53 AM 10/16/2024

## 2025-01-05 NOTE — TELEPHONE ENCOUNTER
Refill Routing Note   Medication(s) are not appropriate for processing by [facility] for the following reason(s):        Patient not seen by provider within 15 months    [facility] action(s):  Defer               Appointments  past 12m or future 3m with PCP    Date Provider   Last Visit   9/27/2023 Katja Dang MD   Next Visit   Visit date not found Katja Dang MD   ED visits in past 90 days: 0        Note composed:6:25 AM 01/05/2025

## 2025-01-06 RX ORDER — ETONOGESTREL AND ETHINYL ESTRADIOL VAGINAL RING .015; .12 MG/D; MG/D
RING VAGINAL
Qty: 3 EACH | Refills: 4 | Status: SHIPPED | OUTPATIENT
Start: 2025-01-06 | End: 2026-01-06

## 2025-01-16 ENCOUNTER — PATIENT MESSAGE (OUTPATIENT)
Dept: OBSTETRICS AND GYNECOLOGY | Facility: CLINIC | Age: 20
End: 2025-01-16
Payer: COMMERCIAL

## 2025-02-04 ENCOUNTER — TELEPHONE (OUTPATIENT)
Dept: OBSTETRICS AND GYNECOLOGY | Facility: CLINIC | Age: 20
End: 2025-02-04
Payer: MEDICAID

## 2025-02-04 DIAGNOSIS — R10.2 PELVIC PAIN: ICD-10-CM

## 2025-02-04 DIAGNOSIS — D21.9 FIBROIDS: Primary | ICD-10-CM

## 2025-02-04 NOTE — TELEPHONE ENCOUNTER
Spoke with pt. Advised pt to contact her insurance company to see if they have switch which band they will approve. Pt verbalized understanding.

## 2025-02-04 NOTE — TELEPHONE ENCOUNTER
----- Message from Sherlyn sent at 2/4/2025  8:07 AM CST -----  Regarding: call back  Name of Who is Calling:pt mom/ [place] [phone]          What is the request in detail:  Pt mom is asking for Dr Dang to call back. She is having severe mood swings since picking up her last bc ring. She states that as of yesterday, she is having pelvic floor pain. It is the same pain that she had in the past when she had the fibroids. She  does have an appt 2/12 that is virtual, she is asking if she can possibly come in sooner, in person. Please call Mom [place]         Can the clinic reply by [facility]:please call           What Number to Call Back if not in [facility]: [phone]

## 2025-02-05 ENCOUNTER — HOSPITAL ENCOUNTER (OUTPATIENT)
Dept: RADIOLOGY | Facility: OTHER | Age: 20
Discharge: HOME | End: 2025-02-05
Attending: STUDENT IN AN ORGANIZED HEALTH CARE EDUCATION/TRAINING PROGRAM
Payer: MEDICAID

## 2025-02-05 DIAGNOSIS — R10.2 PELVIC PAIN: ICD-10-CM

## 2025-02-05 DIAGNOSIS — D21.9 FIBROIDS: ICD-10-CM

## 2025-02-05 PROCEDURE — 76830 TRANSVAGINAL US NON-OB: CPT | Mod: 26,,, | Performed by: RADIOLOGY

## 2025-02-05 PROCEDURE — 76830 TRANSVAGINAL US NON-OB: CPT | Mod: TC

## 2025-02-05 PROCEDURE — 76856 US EXAM PELVIC COMPLETE: CPT | Mod: 26,,, | Performed by: RADIOLOGY

## 2025-02-07 ENCOUNTER — PATIENT MESSAGE (OUTPATIENT)
Dept: OBSTETRICS AND GYNECOLOGY | Facility: CLINIC | Age: 20
End: 2025-02-07
Payer: MEDICAID

## 2025-02-07 RX ORDER — IBUPROFEN 200 MG
200 TABLET ORAL EVERY 6 HOURS PRN
Qty: 60 TABLET | Refills: 2 | Status: SHIPPED | OUTPATIENT
Start: 2025-02-07

## 2025-02-07 RX ORDER — ETONOGESTREL AND ETHINYL ESTRADIOL VAGINAL RING .015; .12 MG/D; MG/D
1 RING VAGINAL
Qty: 3 EACH | Refills: 3 | Status: SHIPPED | OUTPATIENT
Start: 2025-02-07 | End: 2026-02-07

## 2025-02-12 ENCOUNTER — OFFICE VISIT (OUTPATIENT)
Dept: OBSTETRICS AND GYNECOLOGY | Facility: CLINIC | Age: 20
End: 2025-02-12
Payer: MEDICAID

## 2025-02-12 DIAGNOSIS — Z30.44 ENCOUNTER FOR SURVEILLANCE OF VAGINAL RING HORMONAL CONTRACEPTIVE DEVICE: Primary | ICD-10-CM

## 2025-02-12 PROCEDURE — 98006 SYNCH AUDIO-VIDEO EST MOD 30: CPT | Mod: 95,,, | Performed by: STUDENT IN AN ORGANIZED HEALTH CARE EDUCATION/TRAINING PROGRAM

## 2025-02-12 RX ORDER — ETONOGESTREL AND ETHINYL ESTRADIOL VAGINAL RING .015; .12 MG/D; MG/D
1 RING VAGINAL
Qty: 3 EACH | Refills: 3 | Status: SHIPPED | OUTPATIENT
Start: 2025-02-12 | End: 2026-02-12

## 2025-02-13 NOTE — PROGRESS NOTES
Visit type: audiovisual  Total time spent with patient: 25 minutes    Each patient to whom he or she provides medical services by telemedicine is:  (1) informed of the relationship between the physician and patient and the respective role of any other health care provider with respect to management of the patient; and (2) notified that he or she may decline to receive medical services by telemedicine and may withdraw from such care at any time.      Chief Complaint: Mood changes with vaginal ring     HPI:      Petra Medina is a 19 y.o.  who presents to discuss changes to vaginal ring. Started nuvaring when she was 14. Liked it a lot, no issues. Was transitioned to depo when her fibroid was diagnosed and then was on myfembree during the fibroid workup. After the myomectomy, she was restarted on nuvaring and has been very happy with it. When she received her most recent prescription, she was given an alternative called Halouette. She states within days of placing the new ring, she experienced mood changes. Describes feeling spangler and depressed with intrusive thoughts. She states she is able to identify that the thoughts are irrational but they are very bothersome. She denies any life stressors, states happy with school and work and her partner. She also reports pelvic pressure after removing the ring, similar to when she had the fibroid. The discomfort is less severe but she didn't experience this with the other vaginal ring. This pressure subsided when she placed her second Halouette. Ideally would like to get back on the original nuvaring.      ROS:     GENERAL: Denies fevers or chills. Feeling well overall.   ABDOMEN: Denies abdominal pain, constipation, diarrhea, nausea, vomiting, change in appetite.   URINARY: Denies frequency, dysuria, hematuria.  GYNECOLOGIC: See HPI.  NEUROLOGIC: Denies syncope or weakness.     Physical Exam:      PHYSICAL EXAM:  There were no vitals taken for this  visit.  There is no height or weight on file to calculate BMI.     APPEARANCE: Well nourished, well developed, in no acute distress.  Exam deferred due to televisit    Results:     US Pelvis Comp with Transvag NON-OB (xpd)  Order: [id number]  Status: Final result       Visible to patient: Yes (seen)       Next appt: 05/08/2025 at 01:00 PM in Obstetrics and Gynecology (Katja Dang MD)       Dx: Fibroids; Pelvic pain    0 Result Notes       1 Patient Communication  Details    Reading Physician Reading Date Result Priority   Houston Langston MD  [phone] 2/6/2025 Routine     Narrative & Impression  EXAMINATION:  US PELVIS COMP WITH TRANSVAG NON-OB (XPD)     CLINICAL HISTORY:  Pelvic pain, history of fibroids; Benign neoplasm of connective and other soft tissue, unspecified     TECHNIQUE:  Transabdominal sonography of the pelvis was performed, followed by transvaginal sonography to better evaluate the uterus and ovaries.     COMPARISON:  Pelvic ultrasound dated 07/11/2023     FINDINGS:  Uterus:     Size: 6.8 x 2.7 x 4.3 cm.     Masses: None     Endometrium: Normal range thickness in this pre-menopausal patient at 3 mm.  Trace endocervical fluid, nonspecific.     Right ovary:     Size:  2.2 x 1.9 x 2.4 cm.     Appearance: Few follicles noted.     Vascular flow: Present     Left ovary:     Size: 2.3 x 2.2 x 2.1 cm.     Appearance: Few follicles noted.     Vascular Flow: Present.     Free Fluid:     None significant.     Impression:     No significant abnormality.         Assessment/Plan:     Encounter for surveillance of vaginal ring hormonal contraceptive device    Other orders  -     etonogestreL-ethinyl estradioL (NUVARING) 0.12-0.015 mg/24 hr vaginal ring; Place 1 each vaginally every 28 days.  Dispense: 3 each; Refill: 3      -- Given her pelvic pressure, we repeated an US with results as above. No fibroid recurrence. She expressed relief.  -- Will re-attempt to prescribe original vaginal ring, although  nuvaring is a generic so may not be able to get it. She will stick with the Halouette if unable to get the nuvaring to see if these changes resolve with time. Will plan for follow up in about 3 months. Will also plan for WWE at that time. All questions answered.    Counseling:       Use of the [employer] Patient Portal discussed and encouraged during today's visit.       Katja Dang MD

## 2025-02-18 ENCOUNTER — PATIENT MESSAGE (OUTPATIENT)
Dept: OBSTETRICS AND GYNECOLOGY | Facility: CLINIC | Age: 20
End: 2025-02-18
Payer: MEDICAID

## 2025-04-01 ENCOUNTER — PATIENT MESSAGE (OUTPATIENT)
Dept: OBSTETRICS AND GYNECOLOGY | Facility: CLINIC | Age: 20
End: 2025-04-01
Payer: MEDICAID

## 2025-04-03 RX ORDER — ETONOGESTREL AND ETHINYL ESTRADIOL VAGINAL RING .015; .12 MG/D; MG/D
1 RING VAGINAL
Qty: 3 EACH | Refills: 3 | Status: SHIPPED | OUTPATIENT
Start: 2025-04-03 | End: 2026-04-03

## 2025-04-07 ENCOUNTER — OFFICE VISIT (OUTPATIENT)
Dept: OBSTETRICS AND GYNECOLOGY | Facility: CLINIC | Age: 20
End: 2025-04-07
Payer: MEDICAID

## 2025-04-07 VITALS
HEIGHT: 63 IN | HEART RATE: 73 BPM | WEIGHT: 122.81 LBS | BODY MASS INDEX: 21.76 KG/M2 | SYSTOLIC BLOOD PRESSURE: 118 MMHG | DIASTOLIC BLOOD PRESSURE: 63 MMHG

## 2025-04-07 DIAGNOSIS — Z01.419 WELL WOMAN EXAM: Primary | ICD-10-CM

## 2025-04-07 PROCEDURE — 3074F SYST BP LT 130 MM HG: CPT | Mod: CPTII,,, | Performed by: STUDENT IN AN ORGANIZED HEALTH CARE EDUCATION/TRAINING PROGRAM

## 2025-04-07 PROCEDURE — 87491 CHLMYD TRACH DNA AMP PROBE: CPT | Performed by: STUDENT IN AN ORGANIZED HEALTH CARE EDUCATION/TRAINING PROGRAM

## 2025-04-07 PROCEDURE — 99999 PR PBB SHADOW E&M-EST. PATIENT-LVL III: CPT | Mod: PBBFAC,,, | Performed by: STUDENT IN AN ORGANIZED HEALTH CARE EDUCATION/TRAINING PROGRAM

## 2025-04-07 PROCEDURE — 99395 PREV VISIT EST AGE 18-39: CPT | Mod: S$PBB,,, | Performed by: STUDENT IN AN ORGANIZED HEALTH CARE EDUCATION/TRAINING PROGRAM

## 2025-04-07 PROCEDURE — 3078F DIAST BP <80 MM HG: CPT | Mod: CPTII,,, | Performed by: STUDENT IN AN ORGANIZED HEALTH CARE EDUCATION/TRAINING PROGRAM

## 2025-04-07 PROCEDURE — 3008F BODY MASS INDEX DOCD: CPT | Mod: CPTII,,, | Performed by: STUDENT IN AN ORGANIZED HEALTH CARE EDUCATION/TRAINING PROGRAM

## 2025-04-07 PROCEDURE — 99213 OFFICE O/P EST LOW 20 MIN: CPT | Mod: PBBFAC,PN | Performed by: STUDENT IN AN ORGANIZED HEALTH CARE EDUCATION/TRAINING PROGRAM

## 2025-04-07 PROCEDURE — 1159F MED LIST DOCD IN RCRD: CPT | Mod: CPTII,,, | Performed by: STUDENT IN AN ORGANIZED HEALTH CARE EDUCATION/TRAINING PROGRAM

## 2025-04-08 NOTE — PROGRESS NOTES
History & Physical  Gynecology      SUBJECTIVE:     Chief Complaint: Well Woman         History of Present Illness:   Petra Medina is a 19 y.o.  who presents with her mom for annual physical exam.   She just picked up the brand name that is equivalent to Nuvaring after being on Haloette for 3 months.  She did feel that some of her symptoms had been improving the longer she used the Haloette. The depressed mood and intrusive thoughts had gotten better.  She is now experiencing increased body odor and sweating. Random flushing without a trigger. Possible weight gain although she doesn't track her weight. Just noticing different fit to her clothes.  Decreased sex drive, increased headaches. Also occasional LLQ and pelvic floor pain that is really just uncomfortable but does remind her of the fibroid pain. This only happens during her cycle. No pain with intercourse but has not been having frequent intercourse. She did just insert the new ring.    No vaginal discharge, odor, or itching. She denies urinary incontinence, dysuria, frequency.   No concerns for STI but amenable to swabs today.    She has no history of abnormal pap smear.   Did receive the HPV vaccine.    She has no family history of breast, colon, or ovarian cancer.       Review of patient's allergies indicates:   Allergen Reactions    Lavender Other (See Comments)     Red face / eyes bloodshot /     Cherries Hives, Itching and Rash        Past Medical History:   Diagnosis Date    Abnormal uterine bleeding (AUB)     ADD (attention deficit disorder with hyperactivity)     Anemia, unspecified     Anxiety and depression     Encounter for blood transfusion     Uterine fibroid       Past Surgical History:   Procedure Laterality Date    DIAGNOSTIC LAPAROSCOPY N/A 2023    Procedure: LAPAROSCOPY, DIAGNOSTIC;  Surgeon: Katja Dang MD;  Location: [facility];  Service: OB/GYN;  Laterality: N/A;    HYSTEROSCOPIC RESECTION OF MYOMA N/A  2023    Procedure: MYOMECTOMY, HYSTEROSCOPIC;  Surgeon: Katja Dang MD;  Location: [facility] OR;  Service: OB/GYN;  Laterality: N/A;    ROBOT-ASSISTED LAPAROSCOPIC UTERINE MYOMECTOMY N/A 2023    Procedure: ROBOTIC MYOMECTOMY, UTERUS;  Surgeon: Juan Carlos Mendez III, MD;  Location: [facility] OR;  Service: OB/GYN;  Laterality: N/A;    TYMPANOSTOMY TUBE PLACEMENT          OB History          0    Para   0    Term   0       0    AB   0    Living   0         SAB   0    IAB   0    Ectopic   0    Multiple   0    Live Births   0                  Family History   Problem Relation Name Age of Onset    No Known Problems Mother      Hypertension Father      Heart disease Father      Heart attacks under age 50 Neg Hx      Early death Neg Hx      Congenital heart disease Neg Hx      Breast cancer Neg Hx      Colon cancer Neg Hx      Ovarian cancer Neg Hx          Social History[1]       Current Outpatient Medications   Medication Sig    etonogestreL-ethinyl estradioL (NUVARING) 0.12-0.015 mg/24 hr vaginal ring Place 1 each vaginally every 28 days.    etonogestreL-ethinyl estradioL (NUVARING) 0.12-0.015 mg/24 hr vaginal ring Place 1 each vaginally every 28 days.    ibuprofen (ADVIL,MOTRIN) 200 MG tablet Take 1 tablet (200 mg total) by mouth every 6 (six) hours as needed for Pain.    ferrous sulfate (FEOSOL) 325 mg (65 mg iron) Tab tablet Take 325 mg by mouth daily with breakfast. (Patient not taking: Reported on 2025)    ondansetron (ZOFRAN) 8 MG tablet Take 8 mg by mouth 2 (two) times daily. (Patient not taking: Reported on 2025)    oxyCODONE (ROXICODONE) 5 MG immediate release tablet Take 1 tablet (5 mg total) by mouth every 6 (six) hours as needed for Pain. (Patient not taking: Reported on 2025)    sertraline (ZOLOFT) 50 MG tablet Take 1 tablet (50 mg total) by mouth once daily.    traMADoL (ULTRAM) 50 mg tablet Take 1 tablet (50 mg total) by mouth every 8 (eight) hours as needed for Pain.  (Patient not taking: Reported on 4/7/2025)    valACYclovir (VALTREX) 1000 MG tablet Take 1 tablet (1,000 mg total) by mouth once daily. for 5 days     No current facility-administered medications for this visit.            Review of Systems:  Review of Systems   Constitutional:  Negative for appetite change, chills and fever.   Respiratory:  Negative for shortness of breath.    Cardiovascular:  Negative for chest pain.   Gastrointestinal:  Negative for abdominal pain, constipation, diarrhea, nausea and vomiting.   Endocrine: Positive for hot flashes.   Genitourinary:  Positive for decreased libido. Negative for dysuria, menstrual problem, pelvic pain, vaginal bleeding, vaginal discharge and vaginal odor.   Integumentary:  Negative for breast mass, nipple discharge and breast skin changes.   Neurological:  Negative for headaches.   Breast: Negative for mass, nipple discharge and skin changes           OBJECTIVE:     Physical Exam:   Physical Exam  Vitals reviewed. Exam conducted with a chaperone present.   Constitutional:       General: She is not in acute distress.     Appearance: Normal appearance. She is well-developed. She is not ill-appearing or toxic-appearing.   HENT:      Head: Normocephalic and atraumatic.      Nose: Nose normal.   Eyes:      Extraocular Movements: Extraocular movements intact.      Conjunctiva/sclera: Conjunctivae normal.   Cardiovascular:      Rate and Rhythm: Normal rate.   Pulmonary:      Effort: Pulmonary effort is normal. No respiratory distress.   Chest:   Breasts:     Right: Normal. No swelling, bleeding, inverted nipple, mass, nipple discharge, skin change or tenderness.      Left: Normal. No swelling, bleeding, inverted nipple, mass, nipple discharge, skin change or tenderness.   Abdominal:      Palpations: Abdomen is soft. There is no mass.      Tenderness: There is no abdominal tenderness. There is no guarding or rebound.   Genitourinary:     Vagina: Normal. No vaginal discharge  or bleeding.      Cervix: No cervical motion tenderness.      Uterus: Not enlarged and not tender.       Adnexa:         Right: No mass, tenderness or fullness.          Left: No mass, tenderness or fullness.        Comments: External genitalia: Normal  Urethral: Nontender, no masses  Urethral meatus: Normal  Bladder: Non-tender  Musculoskeletal:         General: Normal range of motion.      Cervical back: Normal range of motion.   Skin:     General: Skin is warm and dry.   Neurological:      Mental Status: She is alert. Mental status is at baseline.   Psychiatric:         Mood and Affect: Mood normal.         Behavior: Behavior normal.         Thought Content: Thought content normal.              ASSESSMENT:       ICD-10-CM ICD-9-CM    1. Well woman exam  Z01.419 V72.31 Liquid-Based Pap Smear, Screening      C. trachomatis/N. gonorrhoeae by AMP DNA                 PLAN:     -- Pap collected today.  -- GC/CT collected.  -- Continue nuvaring for contraception.  -- Will plan for follow up in 3 months. She will reach out with worsening symptoms.      Katja Dagn MD         [1]   Social History  Tobacco Use    Smoking status: Never    Smokeless tobacco: Never    Tobacco comments:     VAPING   Substance Use Topics    Alcohol use: Not Currently     Comment: SOCIAL    Drug use: No

## 2025-04-10 ENCOUNTER — RESULTS FOLLOW-UP (OUTPATIENT)
Dept: OBSTETRICS AND GYNECOLOGY | Facility: CLINIC | Age: 20
End: 2025-04-10

## 2025-04-10 LAB
C TRACH DNA SPEC QL NAA+PROBE: NOT DETECTED
CTGC SOURCE (OHS) ORD-325: NORMAL
N GONORRHOEA DNA UR QL NAA+PROBE: NOT DETECTED

## 2025-07-25 ENCOUNTER — PATIENT MESSAGE (OUTPATIENT)
Dept: OBSTETRICS AND GYNECOLOGY | Facility: CLINIC | Age: 20
End: 2025-07-25
Payer: COMMERCIAL

## 2025-07-28 RX ORDER — ETONOGESTREL AND ETHINYL ESTRADIOL VAGINAL RING .015; .12 MG/D; MG/D
1 RING VAGINAL
Qty: 3 EACH | Refills: 3 | Status: SHIPPED | OUTPATIENT
Start: 2025-07-28 | End: 2026-07-28

## 2025-07-29 ENCOUNTER — TELEPHONE (OUTPATIENT)
Dept: OBSTETRICS AND GYNECOLOGY | Facility: CLINIC | Age: 20
End: 2025-07-29
Payer: COMMERCIAL

## 2025-08-14 ENCOUNTER — OFFICE VISIT (OUTPATIENT)
Dept: OBSTETRICS AND GYNECOLOGY | Facility: CLINIC | Age: 20
End: 2025-08-14
Payer: COMMERCIAL

## 2025-08-14 DIAGNOSIS — Z30.09 ENCOUNTER FOR COUNSELING REGARDING CONTRACEPTION: Primary | ICD-10-CM

## 2025-08-14 DIAGNOSIS — L70.9 ACNE, UNSPECIFIED ACNE TYPE: ICD-10-CM

## 2025-08-14 DIAGNOSIS — Z98.890 S/P MYOMECTOMY: ICD-10-CM

## 2025-08-14 PROCEDURE — 98006 SYNCH AUDIO-VIDEO EST MOD 30: CPT | Mod: 95,,, | Performed by: STUDENT IN AN ORGANIZED HEALTH CARE EDUCATION/TRAINING PROGRAM

## 2025-08-14 RX ORDER — ETONOGESTREL AND ETHINYL ESTRADIOL VAGINAL RING .015; .12 MG/D; MG/D
1 RING VAGINAL
Qty: 3 EACH | Refills: 3 | Status: SHIPPED | OUTPATIENT
Start: 2025-08-14 | End: 2026-08-14

## 2025-08-19 ENCOUNTER — LAB VISIT (OUTPATIENT)
Dept: LAB | Facility: HOSPITAL | Age: 20
End: 2025-08-19
Attending: STUDENT IN AN ORGANIZED HEALTH CARE EDUCATION/TRAINING PROGRAM
Payer: COMMERCIAL

## 2025-08-19 DIAGNOSIS — L70.9 ACNE, UNSPECIFIED ACNE TYPE: ICD-10-CM

## 2025-08-19 LAB — TSH SERPL-ACNC: 1.21 UIU/ML (ref 0.4–4)

## 2025-08-19 PROCEDURE — 82670 ASSAY OF TOTAL ESTRADIOL: CPT

## 2025-08-19 PROCEDURE — 83001 ASSAY OF GONADOTROPIN (FSH): CPT

## 2025-08-19 PROCEDURE — 36415 COLL VENOUS BLD VENIPUNCTURE: CPT

## 2025-08-19 PROCEDURE — 84443 ASSAY THYROID STIM HORMONE: CPT

## 2025-08-19 PROCEDURE — 84270 ASSAY OF SEX HORMONE GLOBUL: CPT

## 2025-08-20 LAB
ESTRADIOL SERPL HS-MCNC: <10 PG/ML
FSH SERPL-ACNC: 1.19 MIU/ML

## 2025-08-22 LAB
W ALBUMIN: 4.2 G/DL
W SEX HORMONE BINDING GLOBULIN: >180 NMOL/L
W TESTOSTERONE, BIOAVAIL: NORMAL NG/DL
W TESTOSTERONE, FREE: NORMAL PG/ML
W TESTOSTERONE, TOTAL, MS: 14 NG/DL

## 2025-08-28 ENCOUNTER — HOSPITAL ENCOUNTER (OUTPATIENT)
Dept: RADIOLOGY | Facility: HOSPITAL | Age: 20
Discharge: HOME | End: 2025-08-28
Attending: STUDENT IN AN ORGANIZED HEALTH CARE EDUCATION/TRAINING PROGRAM
Payer: COMMERCIAL

## 2025-08-28 DIAGNOSIS — Z98.890 S/P MYOMECTOMY: ICD-10-CM

## 2025-08-28 PROCEDURE — 76830 TRANSVAGINAL US NON-OB: CPT | Mod: TC

## (undated) DEVICE — TRAY DO THE ROBOT

## (undated) DEVICE — COVER TIP CURVED SCISSORS XI

## (undated) DEVICE — BAG TISS RETRV MONARCH 10MM

## (undated) DEVICE — DECANTER FLUID TRNSF WHITE 9IN

## (undated) DEVICE — GLOVE BIOGEL SKINSENSE PI 6.5

## (undated) DEVICE — VIDEO RENTAL SERVICE

## (undated) DEVICE — HEMOSTAT SURGICEL 4X8IN

## (undated) DEVICE — PACK FLUENT DISPOSABLE

## (undated) DEVICE — SOL ELECTROLUBE ANTI-STIC

## (undated) DEVICE — ELECTRODE REM PLYHSV RETURN 9

## (undated) DEVICE — TOWEL OR DISP STRL BLUE 4/PK

## (undated) DEVICE — PACK LAPAROSCOPY BAPTIST

## (undated) DEVICE — GELPOINT MINI KIT ENDOSCOPIC

## (undated) DEVICE — SOL NORMAL USPCA 0.9%

## (undated) DEVICE — TROCAR ENDOPATH XCEL 12X100MM

## (undated) DEVICE — MARKER SKIN STND TIP BLUE BARR

## (undated) DEVICE — Device

## (undated) DEVICE — DEVICE MYOSURE XL FMS TISS REM

## (undated) DEVICE — SUT MCRYL PLUS 4-0 PS2 27IN

## (undated) DEVICE — SUT VICRYL PLUS 0 CT1 36IN

## (undated) DEVICE — SCISSOR 5MMX35CM DIRECT DRIVE

## (undated) DEVICE — SYR 10CC LUER LOCK

## (undated) DEVICE — TROCAR KII FIOS 5MM X 100MM

## (undated) DEVICE — SOL NACL IRR 3000ML

## (undated) DEVICE — SOL IRR SOD CHL .9% POUR

## (undated) DEVICE — JELLY SURGILUBE 5GR

## (undated) DEVICE — KIT WING PAD POSITIONING

## (undated) DEVICE — NDL INSUFFLATION VERRES 120MM

## (undated) DEVICE — SEAL UROLOGY

## (undated) DEVICE — NDL WILLIAMS CYSTOSCOPIC

## (undated) DEVICE — UNDERGLOVE BIOGEL PI SZ 6.5 LF

## (undated) DEVICE — SEAL UNIVERSAL 5MM-8MM XI

## (undated) DEVICE — SET TRI-LUMEN FILTERED TUBE

## (undated) DEVICE — NDL HYPO REG 25G X 1 1/2

## (undated) DEVICE — PENCIL ELECTROSURG HOLST W/BLD

## (undated) DEVICE — SOL BETADINE 5%

## (undated) DEVICE — PORT AIRSEAL 12/120MM LPI

## (undated) DEVICE — LOOP CUT BIPOLAR 24/26FR YEL

## (undated) DEVICE — TIP RUMI WHITE DISP UMW676

## (undated) DEVICE — BLADE SURG STAINLESS STEEL #10

## (undated) DEVICE — BLADE SURGICAL CARBON #22

## (undated) DEVICE — SUT VICRYL 3-0 27 SH

## (undated) DEVICE — SOL POVIDONE PREP IODINE 4 OZ

## (undated) DEVICE — DRAPE ARM DAVINCI XI

## (undated) DEVICE — DRESSING ABSRBNT ISLAND 3.6X8

## (undated) DEVICE — DRAPE COLUMN DAVINCI XI

## (undated) DEVICE — SOL POVIDONE SCRUB IODINE 4 OZ

## (undated) DEVICE — SUT V-LOC 180 ABD 2/0 GS-21

## (undated) DEVICE — SEAL LENS SCOPE MYOSURE

## (undated) DEVICE — INSERT CUSHIONPRONE VIEW LARGE

## (undated) DEVICE — SET BASIN 48X48IN 6000ML RING

## (undated) DEVICE — UTERINE MANIPULATOR HUMI 6003

## (undated) DEVICE — SYS SEE SHARP SCP ANTIFG LNG